# Patient Record
Sex: FEMALE | Race: WHITE | NOT HISPANIC OR LATINO | Employment: OTHER | URBAN - METROPOLITAN AREA
[De-identification: names, ages, dates, MRNs, and addresses within clinical notes are randomized per-mention and may not be internally consistent; named-entity substitution may affect disease eponyms.]

---

## 2017-03-16 ENCOUNTER — ALLSCRIPTS OFFICE VISIT (OUTPATIENT)
Dept: OTHER | Facility: OTHER | Age: 62
End: 2017-03-16

## 2017-03-16 DIAGNOSIS — Z12.31 ENCOUNTER FOR SCREENING MAMMOGRAM FOR MALIGNANT NEOPLASM OF BREAST: ICD-10-CM

## 2017-08-28 ENCOUNTER — HOSPITAL ENCOUNTER (EMERGENCY)
Facility: HOSPITAL | Age: 62
Discharge: HOME | End: 2017-08-28
Payer: MEDICARE

## 2017-08-28 ENCOUNTER — OFFICE VISIT (OUTPATIENT)
Dept: URGENT CARE | Facility: CLINIC | Age: 62
End: 2017-08-28
Payer: MEDICARE

## 2017-08-28 ENCOUNTER — APPOINTMENT (EMERGENCY)
Dept: CT IMAGING | Facility: HOSPITAL | Age: 62
End: 2017-08-28
Payer: MEDICARE

## 2017-08-28 VITALS
TEMPERATURE: 98.2 F | SYSTOLIC BLOOD PRESSURE: 132 MMHG | HEART RATE: 80 BPM | RESPIRATION RATE: 18 BRPM | WEIGHT: 201.06 LBS | HEIGHT: 64 IN | BODY MASS INDEX: 34.33 KG/M2 | DIASTOLIC BLOOD PRESSURE: 70 MMHG | OXYGEN SATURATION: 100 %

## 2017-08-28 DIAGNOSIS — R42 EPISODIC LIGHTHEADEDNESS: ICD-10-CM

## 2017-08-28 DIAGNOSIS — R19.7 ACUTE DIARRHEA: Primary | ICD-10-CM

## 2017-08-28 LAB
ALBUMIN SERPL BCP-MCNC: 3.6 G/DL (ref 3.5–5)
ALP SERPL-CCNC: 97 U/L (ref 46–116)
ALT SERPL W P-5'-P-CCNC: 29 U/L (ref 12–78)
ANION GAP SERPL CALCULATED.3IONS-SCNC: 10 MMOL/L (ref 4–13)
AST SERPL W P-5'-P-CCNC: 17 U/L (ref 5–45)
BASOPHILS # BLD AUTO: 0.03 THOUSANDS/ΜL (ref 0–0.1)
BASOPHILS NFR BLD AUTO: 0 % (ref 0–1)
BILIRUB SERPL-MCNC: 0.4 MG/DL (ref 0.2–1)
BILIRUB UR QL STRIP: ABNORMAL
BUN SERPL-MCNC: 10 MG/DL (ref 5–25)
CALCIUM SERPL-MCNC: 9.3 MG/DL (ref 8.3–10.1)
CHLORIDE SERPL-SCNC: 104 MMOL/L (ref 100–108)
CLARITY UR: CLEAR
CO2 SERPL-SCNC: 26 MMOL/L (ref 21–32)
COLOR UR: YELLOW
CREAT SERPL-MCNC: 0.82 MG/DL (ref 0.6–1.3)
EOSINOPHIL # BLD AUTO: 0.1 THOUSAND/ΜL (ref 0–0.61)
EOSINOPHIL NFR BLD AUTO: 1 % (ref 0–6)
ERYTHROCYTE [DISTWIDTH] IN BLOOD BY AUTOMATED COUNT: 12.5 % (ref 11.6–15.1)
GFR SERPL CREATININE-BSD FRML MDRD: 77 ML/MIN/1.73SQ M
GLUCOSE SERPL-MCNC: 91 MG/DL (ref 65–140)
GLUCOSE UR STRIP-MCNC: NEGATIVE MG/DL
HCT VFR BLD AUTO: 43.3 % (ref 34.8–46.1)
HGB BLD-MCNC: 14.9 G/DL (ref 11.5–15.4)
HGB UR QL STRIP.AUTO: NEGATIVE
KETONES UR STRIP-MCNC: NEGATIVE MG/DL
LEUKOCYTE ESTERASE UR QL STRIP: NEGATIVE
LIPASE SERPL-CCNC: 84 U/L (ref 73–393)
LYMPHOCYTES # BLD AUTO: 2.24 THOUSANDS/ΜL (ref 0.6–4.47)
LYMPHOCYTES NFR BLD AUTO: 23 % (ref 14–44)
MCH RBC QN AUTO: 32.6 PG (ref 26.8–34.3)
MCHC RBC AUTO-ENTMCNC: 34.4 G/DL (ref 31.4–37.4)
MCV RBC AUTO: 95 FL (ref 82–98)
MONOCYTES # BLD AUTO: 0.8 THOUSAND/ΜL (ref 0.17–1.22)
MONOCYTES NFR BLD AUTO: 8 % (ref 4–12)
NEUTROPHILS # BLD AUTO: 6.7 THOUSANDS/ΜL (ref 1.85–7.62)
NEUTS SEG NFR BLD AUTO: 68 % (ref 43–75)
NITRITE UR QL STRIP: NEGATIVE
PH UR STRIP.AUTO: 5.5 [PH] (ref 4.5–8)
PLATELET # BLD AUTO: 212 THOUSANDS/UL (ref 149–390)
PMV BLD AUTO: 11.1 FL (ref 8.9–12.7)
POTASSIUM SERPL-SCNC: 3.8 MMOL/L (ref 3.5–5.3)
PROT SERPL-MCNC: 7.2 G/DL (ref 6.4–8.2)
PROT UR STRIP-MCNC: NEGATIVE MG/DL
RBC # BLD AUTO: 4.57 MILLION/UL (ref 3.81–5.12)
SODIUM SERPL-SCNC: 140 MMOL/L (ref 136–145)
SP GR UR STRIP.AUTO: 1.02 (ref 1–1.03)
TROPONIN I SERPL-MCNC: <0.02 NG/ML
UROBILINOGEN UR QL STRIP.AUTO: 0.2 E.U./DL
WBC # BLD AUTO: 9.87 THOUSAND/UL (ref 4.31–10.16)

## 2017-08-28 PROCEDURE — 85025 COMPLETE CBC W/AUTO DIFF WBC: CPT | Performed by: PHYSICIAN ASSISTANT

## 2017-08-28 PROCEDURE — 80053 COMPREHEN METABOLIC PANEL: CPT | Performed by: PHYSICIAN ASSISTANT

## 2017-08-28 PROCEDURE — 83690 ASSAY OF LIPASE: CPT | Performed by: PHYSICIAN ASSISTANT

## 2017-08-28 PROCEDURE — 74177 CT ABD & PELVIS W/CONTRAST: CPT

## 2017-08-28 PROCEDURE — 96375 TX/PRO/DX INJ NEW DRUG ADDON: CPT

## 2017-08-28 PROCEDURE — 81003 URINALYSIS AUTO W/O SCOPE: CPT | Performed by: PHYSICIAN ASSISTANT

## 2017-08-28 PROCEDURE — 93005 ELECTROCARDIOGRAM TRACING: CPT | Performed by: PHYSICIAN ASSISTANT

## 2017-08-28 PROCEDURE — 99284 EMERGENCY DEPT VISIT MOD MDM: CPT

## 2017-08-28 PROCEDURE — G0463 HOSPITAL OUTPT CLINIC VISIT: HCPCS

## 2017-08-28 PROCEDURE — 96374 THER/PROPH/DIAG INJ IV PUSH: CPT

## 2017-08-28 PROCEDURE — 84484 ASSAY OF TROPONIN QUANT: CPT | Performed by: PHYSICIAN ASSISTANT

## 2017-08-28 PROCEDURE — 36415 COLL VENOUS BLD VENIPUNCTURE: CPT | Performed by: PHYSICIAN ASSISTANT

## 2017-08-28 PROCEDURE — 99213 OFFICE O/P EST LOW 20 MIN: CPT

## 2017-08-28 RX ORDER — ONDANSETRON 2 MG/ML
4 INJECTION INTRAMUSCULAR; INTRAVENOUS ONCE
Status: COMPLETED | OUTPATIENT
Start: 2017-08-28 | End: 2017-08-28

## 2017-08-28 RX ORDER — KETOROLAC TROMETHAMINE 30 MG/ML
15 INJECTION, SOLUTION INTRAMUSCULAR; INTRAVENOUS ONCE
Status: COMPLETED | OUTPATIENT
Start: 2017-08-28 | End: 2017-08-28

## 2017-08-28 RX ORDER — DICYCLOMINE HCL 20 MG
20 TABLET ORAL ONCE
Status: COMPLETED | OUTPATIENT
Start: 2017-08-28 | End: 2017-08-28

## 2017-08-28 RX ORDER — ERGOCALCIFEROL 1.25 MG/1
50000 CAPSULE ORAL WEEKLY
COMMUNITY
End: 2018-10-03 | Stop reason: SDUPTHER

## 2017-08-28 RX ORDER — DICYCLOMINE HYDROCHLORIDE 10 MG/1
10 CAPSULE ORAL
Qty: 20 CAPSULE | Refills: 0 | Status: SHIPPED | OUTPATIENT
Start: 2017-08-28 | End: 2018-10-03 | Stop reason: ALTCHOICE

## 2017-08-28 RX ORDER — ONDANSETRON 4 MG/1
4 TABLET, ORALLY DISINTEGRATING ORAL EVERY 4 HOURS PRN
Qty: 10 TABLET | Refills: 0 | Status: SHIPPED | OUTPATIENT
Start: 2017-08-28 | End: 2018-10-03 | Stop reason: ALTCHOICE

## 2017-08-28 RX ADMIN — KETOROLAC TROMETHAMINE 15 MG: 30 INJECTION, SOLUTION INTRAMUSCULAR; INTRAVENOUS at 17:59

## 2017-08-28 RX ADMIN — ONDANSETRON 4 MG: 2 INJECTION INTRAMUSCULAR; INTRAVENOUS at 17:59

## 2017-08-28 RX ADMIN — IOHEXOL 100 ML: 350 INJECTION, SOLUTION INTRAVENOUS at 18:26

## 2017-08-28 RX ADMIN — DICYCLOMINE HYDROCHLORIDE 20 MG: 20 TABLET ORAL at 19:02

## 2017-08-30 LAB
ATRIAL RATE: 69 BPM
P AXIS: 18 DEGREES
PR INTERVAL: 156 MS
QRS AXIS: -10 DEGREES
QRSD INTERVAL: 80 MS
QT INTERVAL: 398 MS
QTC INTERVAL: 426 MS
T WAVE AXIS: 32 DEGREES
VENTRICULAR RATE: 69 BPM

## 2017-09-06 ENCOUNTER — TRANSCRIBE ORDERS (OUTPATIENT)
Dept: ADMINISTRATIVE | Facility: HOSPITAL | Age: 62
End: 2017-09-06

## 2017-09-06 ENCOUNTER — APPOINTMENT (OUTPATIENT)
Dept: LAB | Facility: HOSPITAL | Age: 62
End: 2017-09-06
Attending: EMERGENCY MEDICINE
Payer: MEDICARE

## 2017-09-06 DIAGNOSIS — R19.7 ACUTE DIARRHEA: ICD-10-CM

## 2017-09-06 LAB — C DIFF TOX GENS STL QL NAA+PROBE: NORMAL

## 2017-09-06 PROCEDURE — 87045 FECES CULTURE AEROBIC BACT: CPT

## 2017-09-06 PROCEDURE — 87046 STOOL CULTR AEROBIC BACT EA: CPT

## 2017-09-06 PROCEDURE — 87899 AGENT NOS ASSAY W/OPTIC: CPT

## 2017-09-06 PROCEDURE — 87493 C DIFF AMPLIFIED PROBE: CPT

## 2017-09-06 PROCEDURE — 87209 SMEAR COMPLEX STAIN: CPT

## 2017-09-06 PROCEDURE — 87015 SPECIMEN INFECT AGNT CONCNTJ: CPT

## 2017-09-06 PROCEDURE — 87177 OVA AND PARASITES SMEARS: CPT

## 2017-09-08 LAB
BACTERIA STL CULT: NORMAL
BACTERIA STL CULT: NORMAL
O+P STL CONC: NORMAL

## 2017-09-12 ENCOUNTER — ALLSCRIPTS OFFICE VISIT (OUTPATIENT)
Dept: OTHER | Facility: OTHER | Age: 62
End: 2017-09-12

## 2017-09-12 DIAGNOSIS — K21.9 GASTRO-ESOPHAGEAL REFLUX DISEASE WITHOUT ESOPHAGITIS: ICD-10-CM

## 2017-09-12 DIAGNOSIS — K58.9 IRRITABLE BOWEL SYNDROME WITHOUT DIARRHEA: ICD-10-CM

## 2017-09-12 DIAGNOSIS — R74.01 NONSPECIFIC ELEVATION OF LEVELS OF TRANSAMINASE AND LACTIC ACID DEHYDROGENASE (LDH): ICD-10-CM

## 2017-09-12 DIAGNOSIS — F32.9 MAJOR DEPRESSIVE DISORDER, SINGLE EPISODE: ICD-10-CM

## 2017-09-12 DIAGNOSIS — Z13.6 ENCOUNTER FOR SCREENING FOR CARDIOVASCULAR DISORDERS: ICD-10-CM

## 2017-09-12 DIAGNOSIS — R74.02 NONSPECIFIC ELEVATION OF LEVELS OF TRANSAMINASE AND LACTIC ACID DEHYDROGENASE (LDH): ICD-10-CM

## 2017-09-12 DIAGNOSIS — R19.7 DIARRHEA: ICD-10-CM

## 2017-09-13 ENCOUNTER — LAB (OUTPATIENT)
Dept: LAB | Facility: CLINIC | Age: 62
End: 2017-09-13
Payer: MEDICARE

## 2017-09-13 ENCOUNTER — TRANSCRIBE ORDERS (OUTPATIENT)
Dept: LAB | Facility: CLINIC | Age: 62
End: 2017-09-13

## 2017-09-13 DIAGNOSIS — Z98.890 DIARRHEA FOLLOWING GASTROINTESTINAL SURGERY: Primary | ICD-10-CM

## 2017-09-13 DIAGNOSIS — Z13.6 SCREENING FOR ISCHEMIC HEART DISEASE: ICD-10-CM

## 2017-09-13 DIAGNOSIS — R19.7 DIARRHEA FOLLOWING GASTROINTESTINAL SURGERY: Primary | ICD-10-CM

## 2017-09-13 DIAGNOSIS — R74.01 NONSPECIFIC ELEVATION OF LEVELS OF TRANSAMINASE OR LACTIC ACID DEHYDROGENASE (LDH): ICD-10-CM

## 2017-09-13 DIAGNOSIS — Z98.890 DIARRHEA FOLLOWING GASTROINTESTINAL SURGERY: ICD-10-CM

## 2017-09-13 DIAGNOSIS — R74.02 NONSPECIFIC ELEVATION OF LEVELS OF TRANSAMINASE OR LACTIC ACID DEHYDROGENASE (LDH): ICD-10-CM

## 2017-09-13 DIAGNOSIS — R19.7 DIARRHEA FOLLOWING GASTROINTESTINAL SURGERY: ICD-10-CM

## 2017-09-13 LAB
ALBUMIN SERPL BCP-MCNC: 3.2 G/DL (ref 3.5–5)
ALP SERPL-CCNC: 90 U/L (ref 46–116)
ALT SERPL W P-5'-P-CCNC: 50 U/L (ref 12–78)
ANION GAP SERPL CALCULATED.3IONS-SCNC: 7 MMOL/L (ref 4–13)
AST SERPL W P-5'-P-CCNC: 16 U/L (ref 5–45)
BASOPHILS # BLD AUTO: 0.03 THOUSANDS/ΜL (ref 0–0.1)
BASOPHILS NFR BLD AUTO: 1 % (ref 0–1)
BILIRUB SERPL-MCNC: 0.49 MG/DL (ref 0.2–1)
BUN SERPL-MCNC: 12 MG/DL (ref 5–25)
CALCIUM SERPL-MCNC: 9.2 MG/DL (ref 8.3–10.1)
CHLORIDE SERPL-SCNC: 108 MMOL/L (ref 100–108)
CHOLEST SERPL-MCNC: 160 MG/DL (ref 50–200)
CO2 SERPL-SCNC: 27 MMOL/L (ref 21–32)
CREAT SERPL-MCNC: 0.88 MG/DL (ref 0.6–1.3)
EOSINOPHIL # BLD AUTO: 0.11 THOUSAND/ΜL (ref 0–0.61)
EOSINOPHIL NFR BLD AUTO: 2 % (ref 0–6)
ERYTHROCYTE [DISTWIDTH] IN BLOOD BY AUTOMATED COUNT: 12.9 % (ref 11.6–15.1)
GFR SERPL CREATININE-BSD FRML MDRD: 71 ML/MIN/1.73SQ M
GLUCOSE P FAST SERPL-MCNC: 87 MG/DL (ref 65–99)
HCT VFR BLD AUTO: 43.8 % (ref 34.8–46.1)
HDLC SERPL-MCNC: 72 MG/DL (ref 40–60)
HGB BLD-MCNC: 14.6 G/DL (ref 11.5–15.4)
LDLC SERPL CALC-MCNC: 72 MG/DL (ref 0–100)
LYMPHOCYTES # BLD AUTO: 2.23 THOUSANDS/ΜL (ref 0.6–4.47)
LYMPHOCYTES NFR BLD AUTO: 36 % (ref 14–44)
MCH RBC QN AUTO: 32.2 PG (ref 26.8–34.3)
MCHC RBC AUTO-ENTMCNC: 33.3 G/DL (ref 31.4–37.4)
MCV RBC AUTO: 97 FL (ref 82–98)
MONOCYTES # BLD AUTO: 0.5 THOUSAND/ΜL (ref 0.17–1.22)
MONOCYTES NFR BLD AUTO: 8 % (ref 4–12)
NEUTROPHILS # BLD AUTO: 3.28 THOUSANDS/ΜL (ref 1.85–7.62)
NEUTS SEG NFR BLD AUTO: 53 % (ref 43–75)
NRBC BLD AUTO-RTO: 0 /100 WBCS
PLATELET # BLD AUTO: 234 THOUSANDS/UL (ref 149–390)
PMV BLD AUTO: 11.2 FL (ref 8.9–12.7)
POTASSIUM SERPL-SCNC: 3.8 MMOL/L (ref 3.5–5.3)
PROT SERPL-MCNC: 7.1 G/DL (ref 6.4–8.2)
RBC # BLD AUTO: 4.53 MILLION/UL (ref 3.81–5.12)
SODIUM SERPL-SCNC: 142 MMOL/L (ref 136–145)
T4 FREE SERPL-MCNC: 1.3 NG/DL (ref 0.76–1.46)
TRIGL SERPL-MCNC: 82 MG/DL
TSH SERPL DL<=0.05 MIU/L-ACNC: 0.77 UIU/ML (ref 0.36–3.74)
WBC # BLD AUTO: 6.16 THOUSAND/UL (ref 4.31–10.16)

## 2017-09-13 PROCEDURE — 80061 LIPID PANEL: CPT

## 2017-09-13 PROCEDURE — 36415 COLL VENOUS BLD VENIPUNCTURE: CPT

## 2017-09-13 PROCEDURE — 85025 COMPLETE CBC W/AUTO DIFF WBC: CPT

## 2017-09-13 PROCEDURE — 84439 ASSAY OF FREE THYROXINE: CPT

## 2017-09-13 PROCEDURE — 80053 COMPREHEN METABOLIC PANEL: CPT

## 2017-09-13 PROCEDURE — 84443 ASSAY THYROID STIM HORMONE: CPT

## 2018-01-13 VITALS
SYSTOLIC BLOOD PRESSURE: 120 MMHG | BODY MASS INDEX: 34.06 KG/M2 | WEIGHT: 199.5 LBS | DIASTOLIC BLOOD PRESSURE: 78 MMHG | HEIGHT: 64 IN

## 2018-01-14 VITALS
HEIGHT: 64 IN | WEIGHT: 199 LBS | SYSTOLIC BLOOD PRESSURE: 126 MMHG | BODY MASS INDEX: 33.97 KG/M2 | DIASTOLIC BLOOD PRESSURE: 80 MMHG

## 2018-06-07 ENCOUNTER — EVALUATION (OUTPATIENT)
Dept: PHYSICAL THERAPY | Facility: CLINIC | Age: 63
End: 2018-06-07
Payer: MEDICARE

## 2018-06-07 DIAGNOSIS — M79.7 FIBROMYALGIA: ICD-10-CM

## 2018-06-07 DIAGNOSIS — M17.0 PRIMARY OSTEOARTHRITIS OF BOTH KNEES: Primary | ICD-10-CM

## 2018-06-07 PROCEDURE — 97163 PT EVAL HIGH COMPLEX 45 MIN: CPT | Performed by: PHYSICAL THERAPIST

## 2018-06-07 PROCEDURE — G8979 MOBILITY GOAL STATUS: HCPCS | Performed by: PHYSICAL THERAPIST

## 2018-06-07 PROCEDURE — G8978 MOBILITY CURRENT STATUS: HCPCS | Performed by: PHYSICAL THERAPIST

## 2018-06-07 PROCEDURE — 97535 SELF CARE MNGMENT TRAINING: CPT | Performed by: PHYSICAL THERAPIST

## 2018-06-07 NOTE — PROGRESS NOTES
PT Evaluation     Today's date: 2018  Patient name: Cj Mckeon  : 1955  MRN: [id number]  Referring provider: Roselia Warren MD  Dx:   Encounter Diagnosis     ICD-10-CM    1  Primary osteoarthritis of both knees M17 0    2  Fibromyalgia M79 7        Start Time: 1045  Stop Time: 1145  Total time in clinic (min): 60 minutes    Assessment  Impairments: abnormal gait, activity intolerance, impaired balance, impaired physical strength, lacks appropriate home exercise program, pain with function and poor posture     Assessment details: Pt presents with chief complaint of B/L knee OA and chronic history of fibromyalgia  PT notes the pt with WNL ROM of the L/S and BLE, decreased fore and BLE mm strength, impaired postural awarness, impaired gait mechanics, decreased balance, pain with function, and decreased functional mobility  Pt would benefit from skilled PT to improve strength, balance, gait, and functional mobility, as well as to decrease pain and AD use with ambulation  Understanding of Dx/Px/POC: good   Prognosis: good    Goals  STG  1  Pt will decrease pain by 25-50%   2  Pt will demonstrate improved gait mechanics   3  Initiate HEP    LTG  1  Pt will improve BLE mm strength by 1-2 mm grades  2  Pt will demonstrate improved standing balance  3   Pt will ambulate safely without AD use for community distances       Plan  Patient would benefit from: PT eval and skilled physical therapy  Planned modality interventions: unattended electrical stimulation and thermotherapy: hydrocollator packs  Planned therapy interventions: abdominal trunk stabilization, balance, flexibility, gait training, graded exercise, home exercise program, manual therapy, massage, neuromuscular re-education, patient education, postural training, strengthening, stretching and therapeutic exercise  Frequency: 3x week  Duration in visits: 12  Duration in weeks: 4  Treatment plan discussed with: patient  Plan details: Discussed findings of POC with pt, pt agreeable to skilled PT 2-3x/wk for 4 wks        Subjective Evaluation    History of Present Illness  Mechanism of injury: Pt presents with chief complaint of B/L knee OA and chronic hx of fibromyalgia  Pt was seen by MD on  and was prescribed a course of skilled PT to treat current symptoms  Pt reports she is receivng Euflexxa injections but feels that they are no longer being effective  Pt is scheduled to receive last Euflexxa injection tomorrow  Pt has been using SPC for approximately 6 months, starting with occasional use for long distances and now is typically all the time, primarily for balance and assistance with gait  Pt notes majority of symptoms in R knee, with tightness in BLE mm, especially in the posterior chain  Pt also notes TrPs along medial aspect of B/L knee and a feeling of fluid accumulation in B/L knees  Recurrent probem    Quality of life: good    Pain  Current pain ratin  At best pain ratin  At worst pain rating: 10  Location: Bilateral lower extremity  Quality: tight, dull ache and sharp  Relieving factors: rest, heat and medications  Aggravating factors: walking and stair climbing (overexertion, weather)  Progression: worsening    Social Support    Employment status: not working    Diagnostic Tests  X-ray: abnormal  Treatments  Previous treatment: injection treatment, physical therapy and massage (massage has helped, chiropractor has not)  Patient Goals  Patient goals for therapy: decreased pain, improved balance, increased motion and increased strength  Patient goal: to walk without cane with minimal pain        Objective     Postural Observations  Seated posture: fair  Standing posture: fair        Palpation   Left   Tenderness of the erector spinae, distal biceps femoris, distal semimembranosus, distal semitendinosus, gluteus christiano, gluteus medius and lumbar paraspinals       Right Tenderness of the erector spinae, distal biceps femoris, distal semimembranosus, distal semitendinosus, gluteus christiano, gluteus medius, lateral gastrocnemius, lumbar paraspinals, proximal biceps femoris, proximal semimembranosus and proximal semitendinosus  Tenderness     Left Hip   Tenderness in the PSIS and greater trochanter  Right Hip   Tenderness in the PSIS, greater trochanter and ischial tuberosity  Right Knee   Tenderness in the lateral joint line  Neurological Testing     Reflexes   Left   Patellar (L4): normal (2+)  Achilles (S1): normal (2+)    Right   Patellar (L4): normal (2+)  Achilles (S1): normal (2+)    Active Range of Motion     Additional Active Range of Motion Details  PT notes the pt with WNL AROM of the L/S and BLE    Strength/Myotome Testing     Left Hip   Planes of Motion   Flexion: 3+  Extension: 4  Abduction: 4  Adduction: 4    Right Hip   Planes of Motion   Flexion: 3+  Extension: 4  Abduction: 4  Adduction: 4    Left Knee   Flexion: 4+  Extension: 3+    Right Knee   Flexion: 4+  Extension: 3+    Left Ankle/Foot   Dorsiflexion: 4+  Plantar flexion: 4+    Right Ankle/Foot   Dorsiflexion: 4+  Plantar flexion: 4+    Additional Strength Details  Pain with b/l resisted knee ext and R hip flexion (mm tightness)    Tests     Additional Tests Details  PT notes the pt with WNL ligament stability B/L, (-) meniscal testing, and equal mm activation    Ambulation   Weight-Bearing Status   Assistive device used: single point cane    Ambulation: Level Surfaces   Ambulation with assistive device: independent    Ambulation: Stairs   Pattern: non-reciprocal  Railings: one rail  Pattern: non-reciprocal  Railings: one rail    Observational Gait   Gait: antalgic   Decreased right stance time and right step length  Quality of Movement During Gait   Trunk  Forward lean  Pelvis    Pelvis (Left): Positive Trendelenburg  Pelvis (Right): Positive Trendelenburg         Flowsheet Rows      Most Recent Value   PT/OT G-Codes   Current Score  35   Projected Score  50   FOTO information reviewed  Yes   Assessment Type  Evaluation   G code set  Mobility: Walking & Moving Around   Mobility: Walking and Moving Around Current Status ()  CL   Mobility: Walking and Moving Around Goal Status ()  CK          Precautions B/L Knee OA; Fibromyalgia    Specialty Daily Treatment Diary     Manual  6/7/18       STM and TPR prn                                            Exercise Diary  6/7/18       NuStep        Standing SLR 3-way        Monster Walks        Side Step and Squat        Fwd/Lat Step Ups        Step Up and Over        Tandem on Foam        Single Leg Stance        Tandem Walk on Foam        Biodex LOS        Biodex LOS        Biodex Random        Biodex Random        Biodex Maze        Biodex Maze        Gait Training                                            Modalities 6/7/18       MHP to Low Back and B/L Knees in seated 15 min

## 2018-06-07 NOTE — LETTER
2018    Steven Gupta MD  [address] [place] [address]    Patient: Claudine Bain   YOB: 1955   Date of Visit: 2018     Encounter Diagnosis     ICD-10-CM    1  Primary osteoarthritis of both knees M17 0    2  Fibromyalgia M79 7        Dear Dr Chi Lowery:    Please review the attached Plan of Care from [facility] recent visit  Please verify that you agree therapy should continue by signing the attached document and sending it back to our office  If you have any questions or concerns, please don't hesitate to call  Sincerely,    Fred Dodd, PT      Referring Provider:      I certify that I have read the below Plan of Care and certify the need for these services furnished under this plan of treatment while under my care  Steven Gupta MD  [facility] 31: [phone]          PT Evaluation     Today's date: 2018  Patient name: Claudine Bain  : 1955  MRN: [id number]  Referring provider: Kishan Day MD  Dx:   Encounter Diagnosis     ICD-10-CM    1  Primary osteoarthritis of both knees M17 0    2  Fibromyalgia M79 7        Start Time: 1045  Stop Time: 1145  Total time in clinic (min): 60 minutes    Assessment  Impairments: abnormal gait, activity intolerance, impaired balance, impaired physical strength, lacks appropriate home exercise program, pain with function and poor posture     Assessment details: Pt presents with chief complaint of B/L knee OA and chronic history of fibromyalgia  PT notes the pt with WNL ROM of the L/S and BLE, decreased fore and BLE mm strength, impaired postural awarness, impaired gait mechanics, decreased balance, pain with function, and decreased functional mobility  Pt would benefit from skilled PT to improve strength, balance, gait, and functional mobility, as well as to decrease pain and AD use with ambulation     Understanding of Dx/Px/POC: good Prognosis: good    Goals  STG  1  Pt will decrease pain by 25-50%   2  Pt will demonstrate improved gait mechanics   3  Initiate HEP    LTG  1  Pt will improve BLE mm strength by 1-2 mm grades  2  Pt will demonstrate improved standing balance  3  Pt will ambulate safely without AD use for community distances       Plan  Patient would benefit from: PT eval and skilled physical therapy  Planned modality interventions: unattended electrical stimulation and thermotherapy: hydrocollator packs  Planned therapy interventions: abdominal trunk stabilization, balance, flexibility, gait training, graded exercise, home exercise program, manual therapy, massage, neuromuscular re-education, patient education, postural training, strengthening, stretching and therapeutic exercise  Frequency: 3x week  Duration in visits: 12  Duration in weeks: 4  Treatment plan discussed with: patient  Plan details: Discussed findings of POC with pt, pt agreeable to skilled PT 2-3x/wk for 4 wks        Subjective Evaluation    History of Present Illness  Mechanism of injury: Pt presents with chief complaint of B/L knee OA and chronic hx of fibromyalgia  Pt was seen by MD on  and was prescribed a course of skilled PT to treat current symptoms  Pt reports she is receivng Euflexxa injections but feels that they are no longer being effective  Pt is scheduled to receive last Euflexxa injection tomorrow  Pt has been using SPC for approximately 6 months, starting with occasional use for long distances and now is typically all the time, primarily for balance and assistance with gait  Pt notes majority of symptoms in R knee, with tightness in BLE mm, especially in the posterior chain  Pt also notes TrPs along medial aspect of B/L knee and a feeling of fluid accumulation in B/L knees     Recurrent probem    Quality of life: good    Pain  Current pain ratin  At best pain ratin  At worst pain rating: 10  Location: Bilateral lower extremity  Quality: tight, dull ache and sharp  Relieving factors: rest, heat and medications  Aggravating factors: walking and stair climbing (overexertion, weather)  Progression: worsening    Social Support    Employment status: not working    Diagnostic Tests  X-ray: abnormal  Treatments  Previous treatment: injection treatment, physical therapy and massage (massage has helped, chiropractor has not)  Patient Goals  Patient goals for therapy: decreased pain, improved balance, increased motion and increased strength  Patient goal: to walk without cane with minimal pain        Objective     Postural Observations  Seated posture: fair  Standing posture: fair        Palpation   Left   Tenderness of the erector spinae, distal biceps femoris, distal semimembranosus, distal semitendinosus, gluteus christiano, gluteus medius and lumbar paraspinals  Right Tenderness of the erector spinae, distal biceps femoris, distal semimembranosus, distal semitendinosus, gluteus christiano, gluteus medius, lateral gastrocnemius, lumbar paraspinals, proximal biceps femoris, proximal semimembranosus and proximal semitendinosus  Tenderness     Left Hip   Tenderness in the PSIS and greater trochanter  Right Hip   Tenderness in the PSIS, greater trochanter and ischial tuberosity  Right Knee   Tenderness in the lateral joint line       Neurological Testing     Reflexes   Left   Patellar (L4): normal (2+)  Achilles (S1): normal (2+)    Right   Patellar (L4): normal (2+)  Achilles (S1): normal (2+)    Active Range of Motion     Additional Active Range of Motion Details  PT notes the pt with WNL AROM of the L/S and BLE    Strength/Myotome Testing     Left Hip   Planes of Motion   Flexion: 3+  Extension: 4  Abduction: 4  Adduction: 4    Right Hip   Planes of Motion   Flexion: 3+  Extension: 4  Abduction: 4  Adduction: 4    Left Knee   Flexion: 4+  Extension: 3+    Right Knee   Flexion: 4+  Extension: 3+    Left Ankle/Foot   Dorsiflexion: 4+  Plantar flexion: 4+    Right Ankle/Foot   Dorsiflexion: 4+  Plantar flexion: 4+    Additional Strength Details  Pain with b/l resisted knee ext and R hip flexion (mm tightness)    Tests     Additional Tests Details  PT notes the pt with WNL ligament stability B/L, (-) meniscal testing, and equal mm activation    Ambulation   Weight-Bearing Status   Assistive device used: single point cane    Ambulation: Level Surfaces   Ambulation with assistive device: independent    Ambulation: Stairs   Pattern: non-reciprocal  Railings: one rail  Pattern: non-reciprocal  Railings: one rail    Observational Gait   Gait: antalgic   Decreased right stance time and right step length  Quality of Movement During Gait   Trunk  Forward lean  Pelvis    Pelvis (Left): Positive Trendelenburg  Pelvis (Right): Positive Trendelenburg         Flowsheet Rows      Most Recent Value   PT/OT G-Codes   Current Score  35   Projected Score  50   FOTO information reviewed  Yes   Assessment Type  Evaluation   G code set  Mobility: Walking & Moving Around   Mobility: Walking and Moving Around Current Status ()  CL   Mobility: Walking and Moving Around Goal Status ()  CK          Precautions B/L Knee OA; Fibromyalgia    Specialty Daily Treatment Diary     Manual  6/7/18       STM and TPR prn                                            Exercise Diary  6/7/18       NuStep        Standing SLR 3-way        Monster Walks        Side Step and Squat        Fwd/Lat Step Ups        Step Up and Over        Tandem on Foam        Single Leg Stance        Tandem Walk on Foam        Biodex LOS        Biodex LOS        Biodex Random        Biodex Random        Biodex Maze        Biodex Maze        Gait Training                                            Modalities 6/7/18       MHP to Low Back and B/L Knees in seated 15 min

## 2018-06-11 ENCOUNTER — TRANSCRIBE ORDERS (OUTPATIENT)
Dept: PHYSICAL THERAPY | Facility: CLINIC | Age: 63
End: 2018-06-11

## 2018-06-11 DIAGNOSIS — M17.0 PRIMARY OSTEOARTHRITIS OF BOTH KNEES: Primary | ICD-10-CM

## 2018-06-11 DIAGNOSIS — M79.7 FIBROMYALGIA: ICD-10-CM

## 2018-06-12 ENCOUNTER — OFFICE VISIT (OUTPATIENT)
Dept: PHYSICAL THERAPY | Facility: CLINIC | Age: 63
End: 2018-06-12
Payer: MEDICARE

## 2018-06-12 DIAGNOSIS — M17.0 PRIMARY OSTEOARTHRITIS OF BOTH KNEES: Primary | ICD-10-CM

## 2018-06-12 DIAGNOSIS — M79.7 FIBROMYALGIA: ICD-10-CM

## 2018-06-12 PROCEDURE — 97110 THERAPEUTIC EXERCISES: CPT

## 2018-06-12 NOTE — PROGRESS NOTES
Daily Note     Today's date: 2018  Patient name: Larry Daniels  : 1955  MRN: [id number]  Referring provider: Natty Mackey MD  Dx: No diagnosis found  Subjective: Pt  Reports she had a terrible weekend  Her pain was 9-10/10  Pt  Reports she is a little better today 8/10 with c/o pain in BLB/LE  Objective: See treatment diary below    Manual  18      STM and TPR prn  NP                                          Exercise Diary  18      NuStep  L1 6'      Standing SLR 3-way  2x5 reps ea      Monster Walks        Side Step and Squat  2x10'      Fwd/Lat Step Ups  X 5 Bi FWD      Step Up and Over        Tandem on Foam        Single Leg Stance        Tandem Walk on Foam        Biodex LOS  Easy x 2 74%/  68%      Biodex LOS        Biodex Random        Biodex Random        Biodex Maze        Biodex Maze        Gait Training                                            Modalities 18      MHP to Low Back and B/L Knees in seated 15 min 15 min                                  Assessment: Tolerated treatment fair  Patient would benefit from continued PT  Pt  Limited today with POC secondary to c/o pain  Pt  Reports her pain has decreased to 6/10 following RX  Plan: Progress treatment as tolerated

## 2018-06-14 ENCOUNTER — OFFICE VISIT (OUTPATIENT)
Dept: PHYSICAL THERAPY | Facility: CLINIC | Age: 63
End: 2018-06-14
Payer: MEDICARE

## 2018-06-14 DIAGNOSIS — M17.0 PRIMARY OSTEOARTHRITIS OF BOTH KNEES: Primary | ICD-10-CM

## 2018-06-14 DIAGNOSIS — M79.7 FIBROMYALGIA: ICD-10-CM

## 2018-06-14 PROCEDURE — 97150 GROUP THERAPEUTIC PROCEDURES: CPT | Performed by: PHYSICAL THERAPIST

## 2018-06-14 PROCEDURE — 97110 THERAPEUTIC EXERCISES: CPT | Performed by: PHYSICAL THERAPIST

## 2018-06-14 PROCEDURE — 97140 MANUAL THERAPY 1/> REGIONS: CPT | Performed by: PHYSICAL THERAPIST

## 2018-06-14 PROCEDURE — 97535 SELF CARE MNGMENT TRAINING: CPT | Performed by: PHYSICAL THERAPIST

## 2018-06-14 NOTE — PROGRESS NOTES
Daily Note     Today's date: 2018  Patient name: Jennifer Varma  : 1955  MRN: [id number]  Referring provider: Delbert Brooks MD  Dx:   Encounter Diagnosis     ICD-10-CM    1  Primary osteoarthritis of both knees M17 0    2  Fibromyalgia M79 7                   Subjective:  Patient reports continuation of bilateral knee pain with difficulty with walking and standing activities  Patient reports the stairs continue to be difficult as well with bilateral knee pain  Patient reports 8/10 pain levels in the bilateral knees at the start of the session  Objective: See treatment diary below    Manual  18     STM and TPR prn  NP Bilat knee and LE stretching with manual LE traction  15 min                                          Exercise Diary  18     NuStep  L1 6' L3 10 Min      Standing SLR 3-way  2x5 reps ea      Monster Walks   4x10 Feet  Light Blue      Side Step and Squat  2x10' 4x10 Feet Light Blue      Fwd/Lat Step Ups  X 5 Bi FWD      Step Up and Over        Tandem on Foam   3x10" Bilat      Single Leg Stance        Tandem Walk on Foam        Biodex LOS  Easy x 2 74%/  68%      Biodex LOS        Biodex Random        Biodex Random        Biodex Maze        Biodex Maze        Gait Training        Stair HR/TR    2x10 3" hold                                  Modalities 18     MHP to Low Back and B/L Knees in seated 15 min 15 min 15 min                                    Assessment: Tolerated treatment fair  PT notes modified treatment secondary to increase pain levels in the bilateral knees  PT notes review of current research results with patient with focus on light to moderate cardiac and strengthening activities leads to best results for fibromyalgic symptoms so patient is aware of PT POC to address symptoms and functional limitations  Post session, the patient reported decrease pain levels and increase ease with walking    PT notes addition of monster walk and side step and squat to HEP to address mobility limitations  Plan: Continue per plan of care

## 2018-06-18 ENCOUNTER — OFFICE VISIT (OUTPATIENT)
Dept: PHYSICAL THERAPY | Facility: CLINIC | Age: 63
End: 2018-06-18
Payer: MEDICARE

## 2018-06-18 DIAGNOSIS — M79.7 FIBROMYALGIA: ICD-10-CM

## 2018-06-18 DIAGNOSIS — M17.0 PRIMARY OSTEOARTHRITIS OF BOTH KNEES: Primary | ICD-10-CM

## 2018-06-18 PROCEDURE — 97110 THERAPEUTIC EXERCISES: CPT | Performed by: PHYSICAL THERAPIST

## 2018-06-18 PROCEDURE — 97140 MANUAL THERAPY 1/> REGIONS: CPT | Performed by: PHYSICAL THERAPIST

## 2018-06-18 PROCEDURE — 97150 GROUP THERAPEUTIC PROCEDURES: CPT | Performed by: PHYSICAL THERAPIST

## 2018-06-18 NOTE — PROGRESS NOTES
Daily Note     Today's date: 2018  Patient name: Glenys Roa  : 1955  MRN: [id number]  Referring provider: Ijeoma Hernandez MD  Dx:   Encounter Diagnosis     ICD-10-CM    1  Primary osteoarthritis of both knees M17 0    2  Fibromyalgia M79 7                   Subjective:  Patient reports having a bad weekend and morning with increase pain levels in the legs and back  Patient reports both calves are sore and tight with difficulty with standing and walking activities  Patient reports 9/10 pain levels at the start of the session  Patient reports 7/10 pain post session  Patient states her fibromyalgia is really acting up leading to the increase pain levels  Objective: See treatment diary below    Manual  18    STM and TPR prn  NP Bilat knee and LE stretching with manual LE traction  15 min  15 min                                        Exercise Diary  18    NuStep  L1 6' L3 10 Min  10 min L3     Standing SLR 3-way  2x5 reps ea      Monster Walks   4x10 Feet  Light Blue      Side Step and Squat  2x10' 4x10 Feet Light Blue      Fwd/Lat Step Ups  X 5 Bi FWD      Step Up and Over        Tandem on Foam   3x10" Bilat  3x10" Bilat     Single Leg Stance        Tandem Walk on Foam        Biodex LOS  Easy x 2 74%/  68%      Biodex LOS        Biodex Random        Biodex Random        Biodex Maze        Biodex Maze        Gait Training        Stair HR/TR    2x10 3" hold      Stair gastroc stretch     10x5" hold Bilat     LTR with Tball     10x5" hold Bilat                 Modalities 18     MHP to Low Back and B/L Knees in seated 15 min 15 min 15 min  15 min                                         Assessment: Tolerated treatment fair  PT notes modified treatment secondary to increase pain levels in the low back and bilateral LE but PT will continue to progress toward therapy goals and full TE session as tolerated by patient          Plan: Continue per plan of care

## 2018-06-20 ENCOUNTER — OFFICE VISIT (OUTPATIENT)
Dept: PHYSICAL THERAPY | Facility: CLINIC | Age: 63
End: 2018-06-20
Payer: MEDICARE

## 2018-06-20 DIAGNOSIS — M17.0 PRIMARY OSTEOARTHRITIS OF BOTH KNEES: Primary | ICD-10-CM

## 2018-06-20 DIAGNOSIS — M79.7 FIBROMYALGIA: ICD-10-CM

## 2018-06-20 PROCEDURE — 97150 GROUP THERAPEUTIC PROCEDURES: CPT | Performed by: PHYSICAL THERAPIST

## 2018-06-20 PROCEDURE — 97110 THERAPEUTIC EXERCISES: CPT | Performed by: PHYSICAL THERAPIST

## 2018-06-20 PROCEDURE — 97140 MANUAL THERAPY 1/> REGIONS: CPT | Performed by: PHYSICAL THERAPIST

## 2018-06-20 NOTE — PROGRESS NOTES
Daily Note     Today's date: 2018  Patient name: Donald Kaur  : 1955  MRN: [id number]  Referring provider: Renetta Jaffe MD  Dx:   Encounter Diagnosis     ICD-10-CM    1  Primary osteoarthritis of both knees M17 0    2  Fibromyalgia M79 7                   Subjective:  Patient reports having a better day today with decrease pain in the back of the legs but continuation of pain in the knees  Patient reports 6/10 pain levels at the start of the session with 5/10 pain levels in the bilateral knees post session  Objective: See treatment diary below    Manual  18   STM and TPR prn  NP Bilat knee and LE stretching with manual LE traction  15 min  15 min 15 min                                        Exercise Diary  18   NuStep  L1 6' L3 10 Min  10 min L3  10 min  L3    Standing SLR 3-way  2x5 reps ea      Monster Walks   4x10 Feet  Light Blue   4x10 Feet Light Blue    Side Step and Squat  2x10' 4x10 Feet Light Blue   4x10 Feet Light Blue    Fwd/Lat Step Ups  X 5 Bi FWD      Step Up and Over        Tandem on Foam   3x10" Bilat  3x10" Bilat  3x10" hold   Bilat    Single Leg Stance        Tandem Walk on Foam        Biodex LOS  Easy x 2 74%/  68%      Biodex LOS        Biodex Random        Biodex Random        Biodex Maze        Biodex Maze        Gait Training        Stair HR/TR    2x10 3" hold      Stair gastroc stretch     10x5" hold Bilat  10x5" Hold   Bilat    LTR with Tball     10x5" hold Bilat     DKTC with Tball      2x10  Orange Tball        Modalities 18   MHP to Low Back and B/L Knees in seated 15 min 15 min 15 min  15 min  15 min                                              Assessment: Tolerated treatment well  PT notes continuation of progression of TE program with focus on gait/balance and manual therapy to decrease pain levels and improve functional limitations to meet therapy goals    PT notes the patient able to tolerate increase standing and walking activities with decrease pain levels in the LE and patient will continue to adjust as tolerated by patient  Plan: Continue per plan of care

## 2018-06-22 ENCOUNTER — OFFICE VISIT (OUTPATIENT)
Dept: PHYSICAL THERAPY | Facility: CLINIC | Age: 63
End: 2018-06-22
Payer: MEDICARE

## 2018-06-22 DIAGNOSIS — M79.7 FIBROMYALGIA: ICD-10-CM

## 2018-06-22 DIAGNOSIS — M17.0 PRIMARY OSTEOARTHRITIS OF BOTH KNEES: Primary | ICD-10-CM

## 2018-06-22 PROCEDURE — 97140 MANUAL THERAPY 1/> REGIONS: CPT | Performed by: PHYSICAL THERAPIST

## 2018-06-22 PROCEDURE — 97110 THERAPEUTIC EXERCISES: CPT | Performed by: PHYSICAL THERAPIST

## 2018-06-22 NOTE — PROGRESS NOTES
Daily Note     Today's date: 2018  Patient name: Arielle Moreno  : 1955  MRN: [id number]  Referring provider: Gracia Arguelles MD  Dx: No diagnosis found  Subjective: Pt  Reports her pain is 8-9/10 today in her whole body  Objective: See treatment diary below    Manual  18   STM and TPR prn Bilat knee and LE stretching  With manual  LE traction  15 min NP Bilat knee and LE stretching with manual LE traction  15 min  15 min 15 min                                        Exercise Diary  18   NuStep L3 10 min L1 6' L3 10 Min  10 min L3  10 min  L3    Standing SLR 3-way  2x5 reps ea      Monster Walks   4x10 Feet  Light Blue   4x10 Feet Light Blue    Side Step and Squat  2x10' 4x10 Feet Light Blue   4x10 Feet Light Blue    Fwd/Lat Step Ups  X 5 Bi FWD      Step Up and Over        Tandem on Foam   3x10" Bilat  3x10" Bilat  3x10" hold   Bilat    Single Leg Stance        Tandem Walk on Foam 4x10'       Biodex LOS  Easy x 2 74%/  68%      Biodex LOS        Biodex Random        Biodex Random        Biodex Maze        Biodex Maze        Gait Training        Stair HR/TR    2x10 3" hold      Stair gastroc stretch  10x10" ea   10x5" hold Bilat  10x5" Hold   Bilat    LTR with Tball  10x5"hold  Bilat   10x5" hold Bilat     DKTC with Tball  10x5"hold    2x10  Orange Tball        Modalities 18   MHP to Low Back and B/L Knees in seated 15 min 15 min 15 min  15 min  15 min                                              Assessment: Tolerated treatment well  Patient demonstrated fatigue post treatment and would benefit from continued PT  Pt  Does report her pain is relieved with therapy, although still persists  Plan: Progress treatment as tolerated

## 2018-06-26 ENCOUNTER — OFFICE VISIT (OUTPATIENT)
Dept: PHYSICAL THERAPY | Facility: CLINIC | Age: 63
End: 2018-06-26
Payer: MEDICARE

## 2018-06-26 DIAGNOSIS — M17.0 PRIMARY OSTEOARTHRITIS OF BOTH KNEES: Primary | ICD-10-CM

## 2018-06-26 DIAGNOSIS — M79.7 FIBROMYALGIA: ICD-10-CM

## 2018-06-26 PROCEDURE — 97112 NEUROMUSCULAR REEDUCATION: CPT | Performed by: PHYSICAL THERAPIST

## 2018-06-26 PROCEDURE — 97110 THERAPEUTIC EXERCISES: CPT | Performed by: PHYSICAL THERAPIST

## 2018-06-26 PROCEDURE — 97150 GROUP THERAPEUTIC PROCEDURES: CPT | Performed by: PHYSICAL THERAPIST

## 2018-06-26 PROCEDURE — 97535 SELF CARE MNGMENT TRAINING: CPT | Performed by: PHYSICAL THERAPIST

## 2018-06-26 NOTE — PROGRESS NOTES
Daily Note     Today's date: 2018  Patient name: Kaye Killian  : 1955  MRN: [id number]  Referring provider: Iveth Ridley MD  Dx:   Encounter Diagnosis     ICD-10-CM    1  Primary osteoarthritis of both knees M17 0    2  Fibromyalgia M79 7        Start Time: 0800  Stop Time: 0930  Total time in clinic (min): 90 minutes    Subjective: Pt reports she had a rough weekend with constant 10/10 pain  Pt began using stabilizing knee brace for R knee as suggested by Dr Anca Wong  Pt began wearing knee brace over weekend but thought it was causing her increased pain        Objective: See treatment diary below       Manual  18   STM and TPR prn Bilat knee and LE stretching  With manual  LE traction  15 min NP 2* pain Bilat knee and LE stretching with manual LE traction  15 min  15 min 15 min                                                                  Exercise Diary  18   NuStep L3 10 min L3 10 min L3 10 Min  10 min L3  10 min  L3    Standing SLR 3-way            Monster Walks   4x10 Feet  Light Blue 4x10 Feet  Light Blue    4x10 Feet Light Blue    Side Step and Squat   4x10 Feet  Light Blue 4x10 Feet Light Blue    4x10 Feet Light Blue    Fwd/Lat Step Ups            Step Up and Over            Tandem on Foam    3x10" Bilat  3x10" Bilat  3x10" hold   Bilat    Single Leg Stance            Tandem Walk on Foam 4x10' 4x10 Feet         Biodex LOS            Biodex LOS            Biodex Random            Biodex Random            Biodex Maze            Biodex Maze            Gait Training            Stair HR/TR     2x10 3" hold        Stair gastroc stretch  10x10" ea 10x10" each   10x5" hold Bilat  10x5" Hold   Bilat    LTR with Tball  10x5"hold  Bilat 2x10 Bilat  5" hold   10x5" hold Bilat      DKTC with Tball  10x5"hold 2x10  5" hold     2x10  Orange Tball          Modalities 18   MHP to Low Back and B/L Knees in seated 15 min 15 min 15 min  15 min  15 min                                      Assessment: Tolerated treatment well  Patient exhibited good technique with therapeutic exercises and would benefit from continued PT  Pt's knee brace adjusted and pt reported relief of symptoms she was feeling over weekend  Pt educated to trial knee brace at new setting to see if benefit can be achieved  Pt tolerated exercises well this session  Noted fatigue/soreness post treatment, however, no increased pain noted t/o treatment  Manual techniques held this visit 2* increased pain levels pre-treatment  Plan: Continue per plan of care  Progress treatment as tolerated

## 2018-06-27 ENCOUNTER — OFFICE VISIT (OUTPATIENT)
Dept: PHYSICAL THERAPY | Facility: CLINIC | Age: 63
End: 2018-06-27
Payer: MEDICARE

## 2018-06-27 DIAGNOSIS — M79.7 FIBROMYALGIA: ICD-10-CM

## 2018-06-27 DIAGNOSIS — M17.0 PRIMARY OSTEOARTHRITIS OF BOTH KNEES: Primary | ICD-10-CM

## 2018-06-27 PROCEDURE — 97140 MANUAL THERAPY 1/> REGIONS: CPT | Performed by: PHYSICAL THERAPIST

## 2018-06-27 PROCEDURE — 97110 THERAPEUTIC EXERCISES: CPT | Performed by: PHYSICAL THERAPIST

## 2018-06-27 NOTE — PROGRESS NOTES
Daily Note     Today's date: 2018  Patient name: Bar Luna  : 1955  MRN: [id number]  Referring provider: Madina Vega MD  Dx:   Encounter Diagnosis     ICD-10-CM    1  Primary osteoarthritis of both knees M17 0    2  Fibromyalgia M79 7                   Subjective:  Patient reports having a really bad past few days with increase pain levels and difficulty with walking and ADL  Patient feels she has to call her doctor to get some pain meds to help with the pain  Patient reports her fibromyalgia is really acting up causing the increase pain levels  Patient reports 10/10 pain levels at the start of the session and 7/10 pain levels post session         Objective: See treatment diary below    Manual  18     STM and TPR prn Bilat knee and LE stretching  With manual  LE traction  15 min NP 2* pain Bilat knee and LE stretching with manual LE traction  15 min                                                                    Exercise Diary  18   NuStep L3 10 min L3 10 min 10 min L2  10 min L3  10 min  L3    Standing SLR 3-way           Monster Walks   4x10 Feet  Light Blue    4x10 Feet Light Blue    Side Step and Squat   4x10 Feet  Light Blue    4x10 Feet Light Blue    Fwd/Lat Step Ups            Step Up and Over            Tandem on Foam     3x10" Bilat  3x10" hold   Bilat    Single Leg Stance            Tandem Walk on Foam 4x10' 4x10 Feet         Biodex LOS            Biodex LOS            Biodex Random            Biodex Random            Biodex Maze            Biodex Maze            Gait Training            Stair HR/TR             Stair gastroc stretch  10x10" ea 10x10" each   10x5" hold Bilat  10x5" Hold   Bilat    LTR with Tball  10x5"hold  Bilat 2x10 Bilat  5" hold   10x5" hold Bilat      DKTC with Tball  10x5"hold 2x10  5" hold     2x10  Orange Tball          Modalities 18   MHP to Low Back and B/L Knees in seated 15 min 15 min 15 min  15 min  15 min                                      Assessment: Tolerated treatment poor  PT notes modified treatment secondary to increase pain levels in the low back and bilateral knees but PT will continue to progress toward therapy goals and full TE session as tolerated by patient  PT notes recommendation to patient to f/u with MD about possible prescription for addressing of the fibromyalgia symptoms and allow PT to progress with POC  Plan: Continue per plan of care

## 2018-06-29 ENCOUNTER — OFFICE VISIT (OUTPATIENT)
Dept: PHYSICAL THERAPY | Facility: CLINIC | Age: 63
End: 2018-06-29
Payer: MEDICARE

## 2018-06-29 DIAGNOSIS — M79.7 FIBROMYALGIA: ICD-10-CM

## 2018-06-29 DIAGNOSIS — M17.0 PRIMARY OSTEOARTHRITIS OF BOTH KNEES: Primary | ICD-10-CM

## 2018-06-29 PROCEDURE — 97110 THERAPEUTIC EXERCISES: CPT | Performed by: PHYSICAL THERAPIST

## 2018-06-29 PROCEDURE — 97140 MANUAL THERAPY 1/> REGIONS: CPT | Performed by: PHYSICAL THERAPIST

## 2018-06-29 PROCEDURE — 97150 GROUP THERAPEUTIC PROCEDURES: CPT | Performed by: PHYSICAL THERAPIST

## 2018-06-29 NOTE — PROGRESS NOTES
Daily Note     Today's date: 2018  Patient name: Oziel Seth  : 1955  MRN: [id number]  Referring provider: Pelno Alvarado MD  Dx:   Encounter Diagnosis     ICD-10-CM    1  Primary osteoarthritis of both knees M17 0    2  Fibromyalgia M79 7                   Subjective:  Patient reports she did contact her PCP and was placed on 6 day medrol pack  Patient reports the pain has drastically decreased with the meds  Patient reports she is happy the staff recommended to the patient to contact PCP about symptoms  Patient reports 4/10 pain levels at the start of the session          Objective: See treatment diary below    Manual  18    STM and TPR prn Bilat knee and LE stretching  With manual  LE traction  15 min NP 2* pain Bilat knee and LE stretching with manual LE traction  15 min  15 min                                                                   Exercise Diary  18   NuStep L3 10 min L3 10 min 10 min L2  10 min L2 10 min  L3    Standing SLR 3-way           Monster Walks   4x10 Feet  Light Blue   4x10 Feet Green  4x10 Feet Light Blue    Side Step and Squat   4x10 Feet  Light Blue   4x10 Feet Green  4x10 Feet Light Blue    Fwd/Lat Step Ups            Step Up and Over            Tandem on Foam      3x10" hold   Bilat    Single Leg Stance       Bridge with Tball 10x   Orange Ball      Tandem Walk on Foam 4x10' 4x10 Feet         Biodex LOS            Biodex LOS            Biodex Random            Biodex Random            Biodex Maze            Biodex Maze            Gait Training            Stair HR/TR             Stair gastroc stretch  10x10" ea 10x10" each   5x10" hold Bilat  10x5" Hold   Bilat    LTR with Tball  10x5"hold  Bilat 2x10 Bilat  5" hold   2x10 Bilat      DKTC with Tball  10x5"hold 2x10  5" hold    2x10  2x10  Orange Tball          Modalities 18   MHP to Low Back and B/L Knees in seated 15 min 15 min 15 min  15 min  15 min                                          Assessment: Tolerated treatment well  PT notes the patient able to tolerate increase TE with the decrease symptoms but PT did modify treatment secondary to not exacerbate symptoms and will continue to progress toward therapy goals and full TE session as tolerated by patient  Plan: Continue per plan of care

## 2018-07-02 ENCOUNTER — OFFICE VISIT (OUTPATIENT)
Dept: PHYSICAL THERAPY | Facility: CLINIC | Age: 63
End: 2018-07-02
Payer: MEDICARE

## 2018-07-02 DIAGNOSIS — M17.0 PRIMARY OSTEOARTHRITIS OF BOTH KNEES: Primary | ICD-10-CM

## 2018-07-02 DIAGNOSIS — M79.7 FIBROMYALGIA: ICD-10-CM

## 2018-07-02 PROCEDURE — 97112 NEUROMUSCULAR REEDUCATION: CPT | Performed by: PHYSICAL THERAPIST

## 2018-07-02 PROCEDURE — 97150 GROUP THERAPEUTIC PROCEDURES: CPT | Performed by: PHYSICAL THERAPIST

## 2018-07-02 PROCEDURE — 97140 MANUAL THERAPY 1/> REGIONS: CPT | Performed by: PHYSICAL THERAPIST

## 2018-07-02 NOTE — PROGRESS NOTES
Daily Note     Today's date: 2018  Patient name: Mirella Sy  : 1955  MRN: [id number]  Referring provider: Placido Flowers MD  Dx:   Encounter Diagnosis     ICD-10-CM    1  Primary osteoarthritis of both knees M17 0    2  Fibromyalgia M79 7                   Subjective:  Patient reports the legs continue to feel good after the pain meds from PCP  Patient reports she has one day left in her medrol dose pack and hopes the pain will continue to be lower after the meds wear off  Patient reports 3/10 pain levels in the legs at the start and end of the session         Objective: See treatment diary below    Manual  18 7/2   STM and TPR prn Bilat knee and LE stretching  With manual  LE traction  15 min NP 2* pain Bilat knee and LE stretching with manual LE traction  15 min  15 min  15 min                                                                  Exercise Diary  18 7/2   NuStep L3 10 min L3 10 min 10 min L2  10 min L2 10 min  L3   Standing SLR 3-way           Monster Walks   4x10 Feet  Light Blue   4x10 Feet Green  6x10 Feet Green    Side Step and Squat   4x10 Feet  Light Blue   4x10 Feet Green  6x10 Feet Green    Fwd/Lat Step Ups            Step Up and Over         10x Bilat   4" step    Tandem on Foam      4x10" hold   Bilat    Single Leg Stance       Bridge with Tball 10x   Orange Ball      Tandem Walk on Foam 4x10' 4x10 Feet         Biodex LOS            Biodex LOS            Biodex Random            Biodex Random            Biodex Maze            Biodex Maze            Gait Training            Stair HR/TR             Stair gastroc stretch  10x10" ea 10x10" each   5x10" hold Bilat  15x5" Hold   Bilat    LTR with Tball  10x5"hold  Bilat 2x10 Bilat  5" hold   2x10 Bilat      DKTC with Tball  10x5"hold 2x10  5" hold    2x10  2x10  Orange Tball          Modalities 18 7/2   MHP to Low Back and B/L Knees in seated 15 min 15 min 15 min 15 min  15 min                                    Assessment: Tolerated treatment well  PT notes continuation of progression of TE program with focus on gait/balance and manual therapy to decrease pain levels and improve functional limitations to meet therapy goals  PT notes the decrease symptoms with the medrol dose pack has assisted with PT ability to progress with POC to meet therapy goals  Plan: Continue per plan of care

## 2018-07-03 ENCOUNTER — EVALUATION (OUTPATIENT)
Dept: PHYSICAL THERAPY | Facility: CLINIC | Age: 63
End: 2018-07-03
Payer: MEDICARE

## 2018-07-03 DIAGNOSIS — M17.0 PRIMARY OSTEOARTHRITIS OF BOTH KNEES: Primary | ICD-10-CM

## 2018-07-03 DIAGNOSIS — M79.7 FIBROMYALGIA: ICD-10-CM

## 2018-07-03 PROCEDURE — 97112 NEUROMUSCULAR REEDUCATION: CPT | Performed by: PHYSICAL THERAPIST

## 2018-07-03 PROCEDURE — G8978 MOBILITY CURRENT STATUS: HCPCS | Performed by: PHYSICAL THERAPIST

## 2018-07-03 PROCEDURE — 97140 MANUAL THERAPY 1/> REGIONS: CPT | Performed by: PHYSICAL THERAPIST

## 2018-07-03 PROCEDURE — G8979 MOBILITY GOAL STATUS: HCPCS | Performed by: PHYSICAL THERAPIST

## 2018-07-03 PROCEDURE — 97150 GROUP THERAPEUTIC PROCEDURES: CPT | Performed by: PHYSICAL THERAPIST

## 2018-07-03 NOTE — LETTER
2018    MD [address] [place] [address]    Patient: Arielle Moreno   YOB: 1955   Date of Visit: 7/3/2018     Encounter Diagnosis     ICD-10-CM    1  Primary osteoarthritis of both knees M17 0    2  Fibromyalgia M79 7        Dear Dr Aristides Baum:    Please review the attached Plan of Care from [facility] recent visit  Please verify that you agree therapy should continue by signing the attached document and sending it back to our office  If you have any questions or concerns, please don't hesitate to call  Sincerely,    Heaven Flores PT      Referring Provider:      I certify that I have read the below Plan of Care and certify the need for these services furnished under this plan of treatment while under my care  Darshan Fields MD  [facility]: [phone]          PT Re-Evaluation     Today's date: 7/3/2018  Patient name: Arielle Moreno  : 1955  MRN: [id number]  Referring provider: Gracia Arguelles MD  Dx:   Encounter Diagnosis     ICD-10-CM    1  Primary osteoarthritis of both knees M17 0    2  Fibromyalgia M79 7                   Assessment  Impairments: abnormal gait, activity intolerance, impaired balance, impaired physical strength, lacks appropriate home exercise program, pain with function and poor posture     Assessment details: Pt presents with chief complaint of B/L knee OA and chronic history of fibromyalgia  PT notes the pt the patient with improved LE strength but continuation of demonstration of antalgic gait pattern and balance with increase pain levels and functional limitations with need for continuation of skilled therapy for 6 weeks with focus on gait/balance, strengthening, manual therapy, posture, analgesic modalities, and update/review of HEP  PT notes the patient's PMH may prolong course of skilled therapy and/or impair prognosis     Understanding of Dx/Px/POC: good   Prognosis: good    Goals  STG  1  Pt will decrease pain by 25-50%-Partial MET  2  Pt will demonstrate improved gait mechanics-Partial MET    3  Initiate HEP-MET    LTG  1  Pt will improve BLE mm strength by 1-2 mm grades-Partial MET   2  Pt will demonstrate improved standing balance-Partial MET   3  Pt will ambulate safely without AD use for community distances-Partial MET   4   DC with HEP-NOT MET        Plan  Patient would benefit from: PT eval and skilled physical therapy  Planned modality interventions: unattended electrical stimulation and thermotherapy: hydrocollator packs  Planned therapy interventions: abdominal trunk stabilization, balance, flexibility, gait training, graded exercise, home exercise program, manual therapy, massage, neuromuscular re-education, patient education, postural training, strengthening, stretching and therapeutic exercise  Frequency: 3x week  Duration in visits: 12  Duration in weeks: 4  Plan of Care beginning date: 7/3/2018  Plan of Care expiration date: 8/3/2018  Treatment plan discussed with: patient  Plan details: PT notes review of POC and findings with patient who is in agreement with PT recommendations of continuation of skilled therapy  Subjective Evaluation    History of Present Illness  Mechanism of injury: Pt presents with chief complaint of B/L knee OA and chronic hx of fibromyalgia  Pt was seen by MD on 6/4 and was prescribed a course of skilled PT to treat current symptoms  Pt reports she is receivng Euflexxa injections but feels that they are no longer being effective  Pt is scheduled to receive last Euflexxa injection tomorrow  Pt has been using SPC for approximately 6 months, starting with occasional use for long distances and now is typically all the time, primarily for balance and assistance with gait  Pt notes majority of symptoms in R knee, with tightness in BLE mm, especially in the posterior chain   Pt also notes TrPs along medial aspect of B/L knee and a feeling of fluid accumulation in B/L knees  Presently the patient has attended 10 sessions of skilled therapy and feels 70-80% improvement with overall increase strength in the legs and some decrease in intensity and duration of symptoms in the low back and LE  Patient reports she is happy with current progress but feels she can get stronger in the legs and wants to work on making walking, stair climbing and ADL easier  Recurrent probem    Quality of life: good    Pain  Current pain ratin  At best pain rating: 3  At worst pain rating: 10  Location: Bilateral lower extremity  Quality: tight, dull ache and sharp  Relieving factors: rest, heat and medications  Aggravating factors: walking and stair climbing (overexertion, weather)  Progression: improved    Social Support    Employment status: not working    Diagnostic Tests  X-ray: abnormal  Treatments  Previous treatment: injection treatment, physical therapy and massage (massage has helped, chiropractor has not)  Patient Goals  Patient goals for therapy: decreased pain, improved balance, increased motion and increased strength  Patient goal: to walk without cane with minimal pain        Objective     Postural Observations  Seated posture: fair  Standing posture: fair        Palpation   Left   Tenderness of the erector spinae, distal biceps femoris, distal semimembranosus, distal semitendinosus, gluteus christiano, gluteus medius and lumbar paraspinals  Right Tenderness of the erector spinae, distal biceps femoris, distal semimembranosus, distal semitendinosus, gluteus christiano, gluteus medius, lateral gastrocnemius, lumbar paraspinals, proximal biceps femoris, proximal semimembranosus and proximal semitendinosus  Additional Palpation Details  PT notes the patient with + pain over the distal HS and anterior tibialis area of the bilateral LE       Tenderness     Left Hip   Tenderness in the PSIS and greater trochanter       Right Hip Tenderness in the PSIS, greater trochanter and ischial tuberosity  Right Knee   Tenderness in the lateral joint line  Neurological Testing     Reflexes   Left   Patellar (L4): normal (2+)  Achilles (S1): normal (2+)    Right   Patellar (L4): normal (2+)  Achilles (S1): normal (2+)    Active Range of Motion   Left Hip   Flexion: 63 degrees     Right Hip   Flexion: 59 degrees     Additional Active Range of Motion Details  PT notes the pt with WNL AROM of the L/S and BLE    Strength/Myotome Testing     Left Hip   Planes of Motion   Flexion: 4  Extension: 4+  Abduction: 4+  Adduction: 5    Right Hip   Planes of Motion   Flexion: 4-  Extension: 4+  Abduction: 4+  Adduction: 5    Left Knee   Flexion: 4+  Extension: 4    Right Knee   Flexion: 4+  Extension: 4-    Left Ankle/Foot   Dorsiflexion: 5  Plantar flexion: 5    Right Ankle/Foot   Dorsiflexion: 5  Plantar flexion: 5    Additional Strength Details  Pain with b/l resisted knee ext and R hip flexion (mm tightness)    Tests     Additional Tests Details  PT notes the pt with WNL ligament stability B/L, (-) meniscal testing, and equal mm activation    Ambulation   Weight-Bearing Status   Assistive device used: single point cane and none    Additional Weight-Bearing Status Details  Patient depending on pain levels may require a SPC with ambulation if pain levels are higher    Ambulation: Level Surfaces   Ambulation with assistive device: independent    Ambulation: Stairs   Pattern: non-reciprocal  Railings: one rail  Pattern: non-reciprocal  Railings: one rail    Observational Gait   Gait: antalgic   Decreased right stance time and right step length  Quality of Movement During Gait   Trunk  Forward lean  Pelvis    Pelvis (Left): Negative Trendelenburg  Pelvis (Right): Negative Trendelenburg         Flowsheet Rows      Most Recent Value   PT/OT G-Codes   FOTO information reviewed  Yes   Assessment Type  Re-evaluation   G code set  Mobility: Walking & Moving Around   Mobility: Walking and Moving Around Current Status ()  CK   Mobility: Walking and Moving Around Goal Status ()  CJ          Precautions B/L Knee OA; Fibromyalgia    Manual  7/3 6/26/18 6/27 6/29 7/2   STM and TPR prn Bilat knee and LE stretching  With manual  LE traction  15 min NP 2* pain Bilat knee and LE stretching with manual LE traction  15 min  15 min  15 min                                                                  Exercise Diary  7/3 6/26/18 6/27 6/29 7/2   NuStep L4 10 min L3 10 min 10 min L2  10 min L2 10 min  L3   Standing SLR 3-way           Monster Walks  6x10 Feet Green  4x10 Feet  Light Blue   4x10 Feet Green  6x10 Feet Green    Side Step and Squat  6x10 Feet Green  4x10 Feet  Light Blue   4x10 Feet Green  6x10 Feet Green    Fwd/Lat Step Ups            Step Up and Over 10x Bilat   4" step        10x Bilat   4" step             Single Leg Stance       Bridge with Tball 10x   Orange Ball     Tandem Walk on Foam 6x10 Feet 4x10 Feet         Biodex LOS            Biodex LOS            Biodex Random            Biodex Random            Biodex Maze            Biodex Maze            Gait Training            Stair HR/TR             Stair gastroc stretch  5x15" Hold Bilat  10x10" each   5x10" hold Bilat  15x5" Hold   Bilat    LTR with Tball  10x5"hold  Bilat 2x10 Bilat  5" hold   2x10 Bilat      DKTC with Tball  10x5"hold 2x10  5" hold    2x10  2x10  Orange Tball          Modalities 7/3  6/26/18 6/27 6/29 7/2   MHP to Low Back and B/L Knees in seated 15 min 15 min 15 min  15 min  15 min

## 2018-07-03 NOTE — PROGRESS NOTES
PT Re-Evaluation     Today's date: 7/3/2018  Patient name: Jeff Chaidez  : 1955  MRN: [id number]  Referring provider: Merly Yoon MD  Dx:   Encounter Diagnosis     ICD-10-CM    1  Primary osteoarthritis of both knees M17 0    2  Fibromyalgia M79 7                   Assessment  Impairments: abnormal gait, activity intolerance, impaired balance, impaired physical strength, lacks appropriate home exercise program, pain with function and poor posture     Assessment details: Pt presents with chief complaint of B/L knee OA and chronic history of fibromyalgia  PT notes the pt the patient with improved LE strength but continuation of demonstration of antalgic gait pattern and balance with increase pain levels and functional limitations with need for continuation of skilled therapy for 6 weeks with focus on gait/balance, strengthening, manual therapy, posture, analgesic modalities, and update/review of HEP  PT notes the patient's PMH may prolong course of skilled therapy and/or impair prognosis  Understanding of Dx/Px/POC: good   Prognosis: good    Goals  STG  1  Pt will decrease pain by 25-50%-Partial MET  2  Pt will demonstrate improved gait mechanics-Partial MET    3  Initiate HEP-MET    LTG  1  Pt will improve BLE mm strength by 1-2 mm grades-Partial MET   2  Pt will demonstrate improved standing balance-Partial MET   3   Pt will ambulate safely without AD use for community distances-Partial MET   4   DC with HEP-NOT MET        Plan  Patient would benefit from: PT eval and skilled physical therapy  Planned modality interventions: unattended electrical stimulation and thermotherapy: hydrocollator packs  Planned therapy interventions: abdominal trunk stabilization, balance, flexibility, gait training, graded exercise, home exercise program, manual therapy, massage, neuromuscular re-education, patient education, postural training, strengthening, stretching and therapeutic exercise  Frequency: 3x week  Duration in visits: 12  Duration in weeks: 4  Plan of Care beginning date: 7/3/2018  Plan of Care expiration date: 8/3/2018  Treatment plan discussed with: patient  Plan details: PT notes review of POC and findings with patient who is in agreement with PT recommendations of continuation of skilled therapy  Subjective Evaluation    History of Present Illness  Mechanism of injury: Pt presents with chief complaint of B/L knee OA and chronic hx of fibromyalgia  Pt was seen by MD on  and was prescribed a course of skilled PT to treat current symptoms  Pt reports she is receivng Euflexxa injections but feels that they are no longer being effective  Pt is scheduled to receive last Euflexxa injection tomorrow  Pt has been using SPC for approximately 6 months, starting with occasional use for long distances and now is typically all the time, primarily for balance and assistance with gait  Pt notes majority of symptoms in R knee, with tightness in BLE mm, especially in the posterior chain  Pt also notes TrPs along medial aspect of B/L knee and a feeling of fluid accumulation in B/L knees  Presently the patient has attended 10 sessions of skilled therapy and feels 70-80% improvement with overall increase strength in the legs and some decrease in intensity and duration of symptoms in the low back and LE  Patient reports she is happy with current progress but feels she can get stronger in the legs and wants to work on making walking, stair climbing and ADL easier     Recurrent probem    Quality of life: good    Pain  Current pain ratin  At best pain rating: 3  At worst pain rating: 10  Location: Bilateral lower extremity  Quality: tight, dull ache and sharp  Relieving factors: rest, heat and medications  Aggravating factors: walking and stair climbing (overexertion, weather)  Progression: improved    Social Support    Employment status: not working    Diagnostic Tests  X-ray: abnormal  Treatments  Previous treatment: injection treatment, physical therapy and massage (massage has helped, chiropractor has not)  Patient Goals  Patient goals for therapy: decreased pain, improved balance, increased motion and increased strength  Patient goal: to walk without cane with minimal pain        Objective     Postural Observations  Seated posture: fair  Standing posture: fair        Palpation   Left   Tenderness of the erector spinae, distal biceps femoris, distal semimembranosus, distal semitendinosus, gluteus christiano, gluteus medius and lumbar paraspinals  Right Tenderness of the erector spinae, distal biceps femoris, distal semimembranosus, distal semitendinosus, gluteus christiano, gluteus medius, lateral gastrocnemius, lumbar paraspinals, proximal biceps femoris, proximal semimembranosus and proximal semitendinosus  Additional Palpation Details  PT notes the patient with + pain over the distal HS and anterior tibialis area of the bilateral LE       Tenderness     Left Hip   Tenderness in the PSIS and greater trochanter  Right Hip   Tenderness in the PSIS, greater trochanter and ischial tuberosity  Right Knee   Tenderness in the lateral joint line       Neurological Testing     Reflexes   Left   Patellar (L4): normal (2+)  Achilles (S1): normal (2+)    Right   Patellar (L4): normal (2+)  Achilles (S1): normal (2+)    Active Range of Motion   Left Hip   Flexion: 63 degrees     Right Hip   Flexion: 59 degrees     Additional Active Range of Motion Details  PT notes the pt with WNL AROM of the L/S and BLE    Strength/Myotome Testing     Left Hip   Planes of Motion   Flexion: 4  Extension: 4+  Abduction: 4+  Adduction: 5    Right Hip   Planes of Motion   Flexion: 4-  Extension: 4+  Abduction: 4+  Adduction: 5    Left Knee   Flexion: 4+  Extension: 4    Right Knee   Flexion: 4+  Extension: 4-    Left Ankle/Foot   Dorsiflexion: 5  Plantar flexion: 5    Right Ankle/Foot   Dorsiflexion: 5  Plantar flexion: 5    Additional Strength Details  Pain with b/l resisted knee ext and R hip flexion (mm tightness)    Tests     Additional Tests Details  PT notes the pt with WNL ligament stability B/L, (-) meniscal testing, and equal mm activation    Ambulation   Weight-Bearing Status   Assistive device used: single point cane and none    Additional Weight-Bearing Status Details  Patient depending on pain levels may require a SPC with ambulation if pain levels are higher    Ambulation: Level Surfaces   Ambulation with assistive device: independent    Ambulation: Stairs   Pattern: non-reciprocal  Railings: one rail  Pattern: non-reciprocal  Railings: one rail    Observational Gait   Gait: antalgic   Decreased right stance time and right step length  Quality of Movement During Gait   Trunk  Forward lean  Pelvis    Pelvis (Left): Negative Trendelenburg  Pelvis (Right): Negative Trendelenburg         Flowsheet Rows      Most Recent Value   PT/OT G-Codes   FOTO information reviewed  Yes   Assessment Type  Re-evaluation   G code set  Mobility: Walking & Moving Around   Mobility: Walking and Moving Around Current Status ()  CK   Mobility: Walking and Moving Around Goal Status ()  CJ          Precautions B/L Knee OA; Fibromyalgia    Manual  7/3 6/26/18 6/27 6/29 7/2   STM and TPR prn Bilat knee and LE stretching  With manual  LE traction  15 min NP 2* pain Bilat knee and LE stretching with manual LE traction  15 min  15 min  15 min                                                                  Exercise Diary  7/3 6/26/18 6/27 6/29 7/2   NuStep L4 10 min L3 10 min 10 min L2  10 min L2 10 min  L3   Standing SLR 3-way           Monster Walks  6x10 Feet Green  4x10 Feet  Light Blue   4x10 Feet Green  6x10 Feet Green    Side Step and Squat  6x10 Feet Green  4x10 Feet  Light Blue   4x10 Feet Green  6x10 Feet Green    Fwd/Lat Step Ups            Step Up and Over 10x Bilat   4" step        10x Bilat   4" step             Single Leg Stance      Bridge with Tball 10x   Orange Ball     Tandem Walk on Foam 6x10 Feet 4x10 Feet         Biodex LOS            Biodex LOS            Biodex Random            Biodex Random            Biodex Maze            Biodex Maze            Gait Training            Stair HR/TR             Stair gastroc stretch  5x15" Hold Bilat  10x10" each   5x10" hold Bilat  15x5" Hold   Bilat    LTR with Tball  10x5"hold  Bilat 2x10 Bilat  5" hold   2x10 Bilat      DKTC with Tball  10x5"hold 2x10  5" hold    2x10  2x10  Orange Tball          Modalities 7/3  6/26/18 6/27 6/29 7/2   MHP to Low Back and B/L Knees in seated 15 min 15 min 15 min  15 min  15 min

## 2018-07-06 ENCOUNTER — OFFICE VISIT (OUTPATIENT)
Dept: PHYSICAL THERAPY | Facility: CLINIC | Age: 63
End: 2018-07-06
Payer: MEDICARE

## 2018-07-06 DIAGNOSIS — M79.7 FIBROMYALGIA: ICD-10-CM

## 2018-07-06 DIAGNOSIS — M17.0 PRIMARY OSTEOARTHRITIS OF BOTH KNEES: Primary | ICD-10-CM

## 2018-07-06 PROCEDURE — 97112 NEUROMUSCULAR REEDUCATION: CPT | Performed by: PHYSICAL THERAPIST

## 2018-07-06 PROCEDURE — 97150 GROUP THERAPEUTIC PROCEDURES: CPT | Performed by: PHYSICAL THERAPIST

## 2018-07-06 PROCEDURE — 97140 MANUAL THERAPY 1/> REGIONS: CPT | Performed by: PHYSICAL THERAPIST

## 2018-07-06 PROCEDURE — 97110 THERAPEUTIC EXERCISES: CPT | Performed by: PHYSICAL THERAPIST

## 2018-07-06 NOTE — PROGRESS NOTES
Daily Note     Today's date: 2018  Patient name: Gale Sims  : 1955  MRN: [id number]  Referring provider: Dany Jaime MD  Dx:   Encounter Diagnosis     ICD-10-CM    1  Primary osteoarthritis of both knees M17 0    2  Fibromyalgia M79 7                   Subjective:  Patient reports the legs are not too bad this morning with overall decrease pain levels and increase ease with standing and walking activities  Patient reports 5/10 pain levels at the start of the session  Patient reports feeling better post session with 4/10 pain levels reported         Objective: See treatment diary below    Precautions B/L Knee OA; Fibromyalgia    Manual  7/3 7/6 6/27 6/29 7   STM and TPR prn Bilat knee and LE stretching  With manual  LE traction  15 min  Bilat knee and LE stretching with manual LE traction  15 min  15 min  15 min                                                                  Exercise Diary  7/3 7/6 6/27 6/29 7/2   NuStep L4 10 min L4 10 min 10 min L2  10 min L2 10 min  L3   Leg and calf press    2x10 20#         Monster Walks  6x10 Feet Green  6x10 Feet   Green    4x10 Feet Green  6x10 Feet Green    Side Step and Squat  6x10 Feet Green  6x10 Feet Green    4x10 Feet Green  6x10 Feet Green    Fwd/Lat Step Ups            Step Up and Over 10x Bilat   4" step  15x Bilat   4" step      10x Bilat   4" step             Single Leg Stance       Bridge with Tball 10x   Orange Ball     Tandem Walk on Foam 6x10 Feet Tandem and side step walk   4x10 Feet          Biodex LOS            Biodex LOS            Biodex Random            Biodex Random            Biodex Maze            Biodex Maze            Gait Training            Stair HR/TR             Stair gastroc stretch  5x15" Hold Bilat  5x15" Hold Bilat    5x10" hold Bilat  15x5" Hold   Bilat    LTR with Tball  10x5"hold  Bilat    2x10 Bilat      DKTC with Tball  10x5"hold     2x10  2x10  Orange Tball          Modalities 7/3  7/6 6/27 6/29 7   MHP to Low Back and B/L Knees in seated 15 min 15 min 15 min  15 min  15 min                                          Assessment: Tolerated treatment well  PT notes continuation of progression of TE program with focus on gait/balance and manual therapy to decrease pain levels and improve functional limitations to meet therapy goals  PT notes addition of side step on foam and leg/calf press to POC to address strength and balance deficits to meet therapy goals  Plan: Continue per plan of care

## 2018-07-16 ENCOUNTER — OFFICE VISIT (OUTPATIENT)
Dept: PHYSICAL THERAPY | Facility: CLINIC | Age: 63
End: 2018-07-16
Payer: MEDICARE

## 2018-07-16 DIAGNOSIS — M79.7 FIBROMYALGIA: ICD-10-CM

## 2018-07-16 DIAGNOSIS — M17.0 PRIMARY OSTEOARTHRITIS OF BOTH KNEES: Primary | ICD-10-CM

## 2018-07-16 PROCEDURE — 97140 MANUAL THERAPY 1/> REGIONS: CPT | Performed by: PHYSICAL THERAPIST

## 2018-07-16 PROCEDURE — 97150 GROUP THERAPEUTIC PROCEDURES: CPT | Performed by: PHYSICAL THERAPIST

## 2018-07-16 NOTE — PROGRESS NOTES
Daily Note     Today's date: 2018  Patient name: Corby Cisneros  : 1955  MRN: [id number]  Referring provider: Florin Feliciano MD  Dx:   Encounter Diagnosis     ICD-10-CM    1  Primary osteoarthritis of both knees M17 0    2  Fibromyalgia M79 7                   Subjective:  Patient reports having some pain in the knee after being on vacation last week  Patient reports walking on the beach was difficult for the patient  Patient reports she is trying to use the OA knee brace as per the MD orders but is having a hard time with the brace  Patient reports 5/10 pain levels in the knees at the start of the session and 3/10 pain levels at the end of the session          Objective: See treatment diary below    Precautions B/L Knee OA; Fibromyalgia    Manual  7/3 7/16  6/27 6/29 7   STM and TPR prn Bilat knee and LE stretching  With manual  LE traction  15 min 15 in  Bilat knee and LE stretching with manual LE traction  15 min  15 min  15 min                                                                  Exercise Diary  7/3 7/16 6/27 6/29 7   NuStep L4 10 min L5 10 min 10 min L2  10 min L2 10 min  L3   Leg and calf press           Monster Walks  6x10 Feet Green  6x10 Feet   Green    4x10 Feet Green  6x10 Feet Green    Side Step and Squat  6x10 Feet Green  6x10 Feet Green   4x10 Feet Green  6x10 Feet Green    Fwd/Lat Step Ups            Step Up and Over 10x Bilat   4" step  2x10 Bilat   4" step      10x Bilat   4" step             Single Leg Stance       Bridge with Tball 10x   Orange Ball     Tandem Walk on Foam 6x10 Feet           Biodex LOS            Biodex LOS            Biodex Random            Biodex Random            Biodex Maze            Biodex Maze            Gait Training            Stair HR/TR             Stair gastroc stretch  5x15" Hold Bilat  5x15" Hold Bilat    5x10" hold Bilat  15x5" Hold   Bilat    LTR with Tball  10x5"hold  Bilat 10x5" hold Bilat    2x10 Bilat      DKTC with Tball 10x5"hold 10x5" Hold     2x10  2x10  Orange Tball          Modalities 7/3  7/16 6/27 6/29 7/2   MHP to Low Back and B/L Knees in seated 15 min 15 min 15 min  15 min  15 min                                        Assessment: Tolerated treatment fair  PT notes modified treatment secondary to increase pain levels in the bilateral knees but PT will continue to progress toward therapy goals and full TE session as tolerated by patient  Plan: Continue per plan of care

## 2018-07-18 ENCOUNTER — APPOINTMENT (OUTPATIENT)
Dept: PHYSICAL THERAPY | Facility: CLINIC | Age: 63
End: 2018-07-18
Payer: MEDICARE

## 2018-07-19 ENCOUNTER — OFFICE VISIT (OUTPATIENT)
Dept: PHYSICAL THERAPY | Facility: CLINIC | Age: 63
End: 2018-07-19
Payer: MEDICARE

## 2018-07-19 DIAGNOSIS — M17.0 PRIMARY OSTEOARTHRITIS OF BOTH KNEES: Primary | ICD-10-CM

## 2018-07-19 DIAGNOSIS — M79.7 FIBROMYALGIA: ICD-10-CM

## 2018-07-19 PROCEDURE — 97535 SELF CARE MNGMENT TRAINING: CPT | Performed by: PHYSICAL THERAPIST

## 2018-07-19 PROCEDURE — 97140 MANUAL THERAPY 1/> REGIONS: CPT | Performed by: PHYSICAL THERAPIST

## 2018-07-19 PROCEDURE — 97150 GROUP THERAPEUTIC PROCEDURES: CPT | Performed by: PHYSICAL THERAPIST

## 2018-07-19 NOTE — PROGRESS NOTES
Daily Note     Today's date: 2018  Patient name: Qasim Reynolds  : 1955  MRN: [id number]  Referring provider: Ishaan Armenta MD  Dx:   Encounter Diagnosis     ICD-10-CM    1  Primary osteoarthritis of both knees M17 0    2  Fibromyalgia M79 7                   Subjective:  Patient expresses concern about feeling muscle/fascial tightness in the right knee and LE  Patient questions if there is anything she is doing wrong to cause this tightness  Patient is frustrated by variety of symptoms and flare-up with fibromyalgia  Patient reports 6/10 pain levels in the knees at the start of the session  Post session, the patient reports 3/10 pain levels in the knees          Objective: See treatment diary below    Precautions B/L Knee OA; Fibromyalgia    Manual  7/3 7/16  7/19     STM and TPR prn Bilat knee and LE stretching  With manual  LE traction  15 min 15 in  Bilat knee and LE stretching with manual LE traction  15 min                                                                    Exercise Diary  7/3 7/16 7/19     NuStep L4 10 min L5 10 min 10 min L5     Leg and calf press         Monster Walks  6x10 Feet Green  6x10 Feet   Green  6x10 Feet  Green      Side Step and Squat  6x10 Feet Green  6x10 Feet Green 6x10  Feet   Green      Fwd/Lat Step Ups          Step Up and Over 10x Bilat   4" step  2x10 Bilat   4" step  13X Bilat   6" step               Single Leg Stance     DD seated trunk rotation and D1/D2 Flex  2x10  Red Ball      Tandem Walk on Foam 6x10 Feet          Biodex LOS            Biodex LOS            Biodex Random            Biodex Random            Biodex Maze            Biodex Maze            Gait Training            Stair HR/TR             Stair gastroc stretch  5x15" Hold Bilat  5x15" Hold Bilat   5x15" Hold      LTR with Tball  10x5"hold  Bilat 10x5" hold Bilat   10x5" hold      DKTC with Tball  10x5"hold 10x5" Hold              Modalities 7/3  7/16 7/19      MHP to Low Back and B/L Knees in seated 15 min 15 min 15 min                                          Assessment: Tolerated treatment fair  PT notes modified treatment secondary patient subjective comments but PT spoke with patient about concerns and addressed with explanation of degenerative changes in the right knee and PMH of fibromyalgia contributing to prolonged symptoms  PT will continue to progress toward therapy goals full TE session as tolerated by patient  Plan: Continue per plan of care

## 2018-07-23 ENCOUNTER — OFFICE VISIT (OUTPATIENT)
Dept: PHYSICAL THERAPY | Facility: CLINIC | Age: 63
End: 2018-07-23
Payer: MEDICARE

## 2018-07-23 ENCOUNTER — APPOINTMENT (OUTPATIENT)
Dept: PHYSICAL THERAPY | Facility: CLINIC | Age: 63
End: 2018-07-23
Payer: MEDICARE

## 2018-07-23 DIAGNOSIS — M17.0 PRIMARY OSTEOARTHRITIS OF BOTH KNEES: Primary | ICD-10-CM

## 2018-07-23 DIAGNOSIS — M79.7 FIBROMYALGIA: ICD-10-CM

## 2018-07-23 PROCEDURE — 97110 THERAPEUTIC EXERCISES: CPT

## 2018-07-23 PROCEDURE — 97140 MANUAL THERAPY 1/> REGIONS: CPT

## 2018-07-23 NOTE — PROGRESS NOTES
Daily Note     Today's date: 2018  Patient name: Karina Díaz  : 1955  MRN: [id number]  Referring provider: Percy Gonsalez MD  Dx: No diagnosis found  Subjective: Pt  Reports she was so sore after her last treatment  The weekend consisted of stretching and hot packs  Pt  Is feeling better today, but does not want to overdue it  Objective: See treatment diary below    Precautions B/L Knee OA; Fibromyalgia    Manual  7/3 7/16  7/19 7/23    STM and TPR prn Bilat knee and LE stretching  With manual  LE traction  15 min 15 in  Bilat knee and LE stretching with manual LE traction  15 min  15 '                                                                  Exercise Diary  7/3 7/16 7/19 7/23    NuStep L4 10 min L5 10 min 10 min L5 10' L5    Leg and calf press         Monster Walks  6x10 Feet Green  6x10 Feet   Green  6x10 Feet  Green  6x10'   Green    Side Step and Squat  6x10 Feet Green  6x10 Feet Green 6x10  Feet   Green  6x10'  Green    Fwd/Lat Step Ups          Step Up and Over 10x Bilat   4" step  2x10 Bilat   4" step  13X Bilat   6" step  2x13  6"step             Single Leg Stance     DD seated trunk rotation and D1/D2 Flex  2x10  Red Ball  DD seated trunk rotation  And D1/D2  Flex 2x12  Red ball    Tandem Walk on Foam 6x10 Feet     6x10'     Biodex LOS            Biodex LOS            Biodex Random            Biodex Random            Biodex Maze            Biodex Maze            Gait Training            Stair HR/TR             Stair gastroc stretch  5x15" Hold Bilat  5x15" Hold Bilat   5x15" Hold  5x15" hold  bilat    LTR with Tball  10x5"hold  Bilat 10x5" hold Bilat   10x5" hold  10x5"hold    DKTC with Tball  10x5"hold 10x5" Hold    10x5"hold          Modalities 7/3  7/16 7/19  7/23    MHP to Low Back and B/L Knees in seated 15 min 15 min 15 min  15 min                                      Assessment: Tolerated treatment well   Patient demonstrated fatigue post treatment and exhibited good technique with therapeutic exercises      Plan: Progress treatment as tolerated

## 2018-07-25 ENCOUNTER — OFFICE VISIT (OUTPATIENT)
Dept: PHYSICAL THERAPY | Facility: CLINIC | Age: 63
End: 2018-07-25
Payer: MEDICARE

## 2018-07-25 ENCOUNTER — APPOINTMENT (OUTPATIENT)
Dept: PHYSICAL THERAPY | Facility: CLINIC | Age: 63
End: 2018-07-25
Payer: MEDICARE

## 2018-07-25 DIAGNOSIS — M17.0 PRIMARY OSTEOARTHRITIS OF BOTH KNEES: Primary | ICD-10-CM

## 2018-07-25 DIAGNOSIS — M79.7 FIBROMYALGIA: ICD-10-CM

## 2018-07-25 PROCEDURE — 97150 GROUP THERAPEUTIC PROCEDURES: CPT | Performed by: PHYSICAL THERAPIST

## 2018-07-25 PROCEDURE — 97110 THERAPEUTIC EXERCISES: CPT | Performed by: PHYSICAL THERAPIST

## 2018-07-25 PROCEDURE — 97112 NEUROMUSCULAR REEDUCATION: CPT | Performed by: PHYSICAL THERAPIST

## 2018-07-25 PROCEDURE — 97140 MANUAL THERAPY 1/> REGIONS: CPT | Performed by: PHYSICAL THERAPIST

## 2018-07-25 NOTE — PROGRESS NOTES
Daily Note     Today's date: 2018  Patient name: Michael Hudson  : 1955  MRN: [id number]  Referring provider: Dasha Clark MD  Dx:   Encounter Diagnosis     ICD-10-CM    1  Primary osteoarthritis of both knees M17 0    2  Fibromyalgia M79 7                   Subjective:  Patient reports having a good workout last session with minimal increase in symptoms  Patient reports she is happy that pain levels are down overall even with the rainy/damp weather  Patient reports 4/10 pain levels in the knees at the start of the session and 2/10 pain levels post session          Objective: See treatment diary below    Precautions B/L Knee OA; Fibromyalgia    Manual  7/3 7/16  7/19 7/23 7/25   STM and TPR prn Bilat knee and LE stretching  With manual  LE traction  15 min 15 in  Bilat knee and LE stretching with manual LE traction  15 min  15 ' 15 min                                                                  Exercise Diary  7/3 7/16 7/19 7/23 7/25   NuStep L4 10 min L5 10 min 10 min L5 10' L5 10 min L5    Leg and calf press      20#  2x10    Monster Walks  6x10 Feet Green  6x10 Feet   Green  6x10 Feet  Green  6x10'   Green 6x10 Feet   Green    Side Step and Squat  6x10 Feet Green  6x10 Feet Green 6x10  Feet   Green  6x10'  Green 6x10 Feet   Green    Fwd/Lat Step Ups          Step Up and Over 10x Bilat   4" step  2x10 Bilat   4" step  13X Bilat   6" step  2x13  6"step 2x10 Bilat   6" step             Single Leg Stance     DD seated trunk rotation and D1/D2 Flex  2x10  Red Ball  DD seated trunk rotation  And D1/D2  Flex 2x12  Red ball DD seated Trunk rotation and D1/D2 Flex   2x10   Red Ball    Tandem Walk on Foam 6x10 Feet     6x10'  6x10 Feet    Biodex LOS            Biodex LOS            Biodex Random            Biodex Random            Biodex Maze            Biodex Maze            Gait Training            Stair HR/TR             Stair gastroc stretch  5x15" Hold Bilat  5x15" Hold Bilat   5x15" Hold  5x15" hold  bilat 5x15" Hold Bilat    LTR with Tball  10x5"hold  Bilat 10x5" hold Bilat   10x5" hold  10x5"hold 10x5" hold    DKTC with Tball  10x5"hold 10x5" Hold    10x5"hold 10x5" hold Bilat          Modalities 7/3  7/16 7/19  7/23 7/25   MHP to Low Back and B/L Knees in seated 15 min 15 min 15 min  15 min 15 min                                          Assessment: Tolerated treatment well  PT notes continuation of progression of TE program with focus on gait/balance and manual therapy to decrease pain levels and improve functional limitations to meet therapy goals  PT notes the patient able to perform leg and calf press with no increase in symptoms with performance  Plan: Continue per plan of care

## 2018-07-27 ENCOUNTER — OFFICE VISIT (OUTPATIENT)
Dept: PHYSICAL THERAPY | Facility: CLINIC | Age: 63
End: 2018-07-27
Payer: MEDICARE

## 2018-07-27 ENCOUNTER — APPOINTMENT (OUTPATIENT)
Dept: PHYSICAL THERAPY | Facility: CLINIC | Age: 63
End: 2018-07-27
Payer: MEDICARE

## 2018-07-27 DIAGNOSIS — M79.7 FIBROMYALGIA: ICD-10-CM

## 2018-07-27 DIAGNOSIS — M17.0 PRIMARY OSTEOARTHRITIS OF BOTH KNEES: Primary | ICD-10-CM

## 2018-07-27 PROCEDURE — 97140 MANUAL THERAPY 1/> REGIONS: CPT | Performed by: PHYSICAL THERAPIST

## 2018-07-27 PROCEDURE — 97112 NEUROMUSCULAR REEDUCATION: CPT | Performed by: PHYSICAL THERAPIST

## 2018-07-27 PROCEDURE — 97150 GROUP THERAPEUTIC PROCEDURES: CPT | Performed by: PHYSICAL THERAPIST

## 2018-07-27 NOTE — PROGRESS NOTES
Daily Note     Today's date: 2018  Patient name: Mirella Sy  : 1955  MRN: [id number]  Referring provider: Placido Flowers MD  Dx:   Encounter Diagnosis     ICD-10-CM    1  Primary osteoarthritis of both knees M17 0    2  Fibromyalgia M79 7                   Subjective:  Patient reports she went to the [facility] yesterday with her family and reports she was able to use the knee brace all day and tolerated walking for the day  Patient reports she was very happy with her ability to walk longer  Patient reports 5/10 at the start of the session and 4/10 pain levels at the end of the session in the bilateral knees           Objective: See treatment diary below    Precautions B/L Knee OA; Fibromyalgia    Manual         STM and TPR prn Bilat knee and LE stretching  With manual  LE traction  15 min                                                                     Exercise Diary     NuStep L5 10 min L5 10 min 10 min L5 10' L5 10 min L5    Leg and calf press      20#  2x10    Monster Walks  6x10 Feet Green  6x10 Feet   Green  6x10 Feet  Green  6x10'   Green 6x10 Feet   Green    Side Step and Squat  6x10 Feet Green  6x10 Feet Green 6x10  Feet   Green  6x10'  Green 6x10 Feet   Green    Fwd/Lat Step Ups          Step Up and Over 15x Bilat   6" step  2x10 Bilat   4" step  13X Bilat   6" step  2x13  6"step 2x10 Bilat   6" step             Single Leg Stance     DD seated trunk rotation and D1/D2 Flex  2x10  Red Ball  DD seated trunk rotation  And D1/D2  Flex 2x12  Red ball DD seated Trunk rotation and D1/D2 Flex   2x10   Red Ball    Tandem Walk on Foam 10x10 Feet     6x10'  6x10 Feet    Biodex LOS  Side step on foam 6x10 Feet           Biodex LOS            Biodex Random            Biodex Random            Biodex Maze            Biodex Maze            Gait Training            Stair HR/TR             Stair gastroc stretch  5x15" Hold Bilat  5x15" Hold Bilat   5x15" Hold  5x15" hold  bilat 5x15" Hold Bilat    LTR with Tball  10x5"hold  Bilat 10x5" hold Bilat   10x5" hold  10x5"hold 10x5" hold    DKTC with Tball  10x5"hold 10x5" Hold    10x5"hold 10x5" hold Bilat          Modalities 7/27        MHP to Low Back and B/L Knees in seated 15 min                                                 Assessment: Tolerated treatment well  PT notes continuation of progression of TE program with focus on gait/balance and manual therapy to decrease pain levels and improve functional limitations to meet therapy goals  PT notes addition of side step on foam to address balance deficits to meet therapy goals  Plan: Continue per plan of care

## 2018-07-30 ENCOUNTER — OFFICE VISIT (OUTPATIENT)
Dept: PHYSICAL THERAPY | Facility: CLINIC | Age: 63
End: 2018-07-30
Payer: MEDICARE

## 2018-07-30 DIAGNOSIS — M17.0 PRIMARY OSTEOARTHRITIS OF BOTH KNEES: Primary | ICD-10-CM

## 2018-07-30 DIAGNOSIS — M79.7 FIBROMYALGIA: ICD-10-CM

## 2018-07-30 PROCEDURE — 97150 GROUP THERAPEUTIC PROCEDURES: CPT | Performed by: PHYSICAL THERAPIST

## 2018-07-30 PROCEDURE — 97140 MANUAL THERAPY 1/> REGIONS: CPT | Performed by: PHYSICAL THERAPIST

## 2018-07-30 NOTE — PROGRESS NOTES
Daily Note     Today's date: 2018  Patient name: Valarie Rose  : 1955  MRN: [id number]  Referring provider: Leda Carolina MD  Dx:   Encounter Diagnosis     ICD-10-CM    1  Primary osteoarthritis of both knees M17 0    2  Fibromyalgia M79 7                   Subjective:  Patient reports having a bad weekend with increase pain and stiffness in the knees  Patient expresses frustration with continuation of flare-ups with increase pain levels in the knees and legs  Patient reports continuation of difficulty with increase pain levels in the knees  Patient reports 7/10 pain levels at the start of the session and 4/10 pain levels in the bilateral knees with statements of feeling looser post session         Objective: See treatment diary below    Precautions B/L Knee OA; Fibromyalgia    Manual         STM and TPR prn Bilat knee and LE stretching  With manual  LE traction  15 min 15 min                                                                     Exercise Diary     NuStep L5 10 min L5 10 min 10 min L5 10' L5 10 min L5    Leg and calf press      20#  2x10    Monster Walks  6x10 Feet Green   6x10 Feet  Green  6x10'   Green 6x10 Feet   Green    Side Step and Squat  6x10 Feet Green   6x10  Feet   Green  6x10'  Green 6x10 Feet   Green    Fwd/Lat Step Ups          Step Up and Over 15x Bilat   6" step    13X Bilat   6" step  2x13  6"step 2x10 Bilat   6" step             Single Leg Stance     DD seated trunk rotation and D1/D2 Flex  2x10  Red Ball  DD seated trunk rotation  And D1/D2  Flex 2x12  Red ball DD seated Trunk rotation and D1/D2 Flex   2x10   Red Ball    Tandem Walk on Foam 10x10 Feet     6x10'  6x10 Feet    Biodex LOS  Side step on foam 6x10 Feet           Biodex LOS            Biodex Random            Biodex Random            Biodex Maze            Biodex Maze            Gait Training            Stair HR/TR             Stair gastroc stretch  5x15" Hold Bilat  5x15" Hold Bilat   5x15" Hold  5x15" hold  bilat 5x15" Hold Bilat    LTR with Tball  10x5"hold  Bilat 10x5" hold Bilat   10x5" hold  10x5"hold 10x5" hold    DKTC with Tball  10x5"hold 10x5" Hold    10x5"hold 10x5" hold Bilat          Modalities 7/27 7/30      MHP to Low Back and B/L Knees in seated 15 min 15 min                                           Assessment: Tolerated treatment fair  PT notes modified treatment secondary to subjective comments but PT will continue to progress toward therapy goals and full TE session as tolerated by patient  Plan: Continue per plan of care

## 2018-08-01 ENCOUNTER — OFFICE VISIT (OUTPATIENT)
Dept: PHYSICAL THERAPY | Facility: CLINIC | Age: 63
End: 2018-08-01
Payer: MEDICARE

## 2018-08-01 DIAGNOSIS — M79.7 FIBROMYALGIA: Primary | ICD-10-CM

## 2018-08-01 DIAGNOSIS — M17.0 PRIMARY OSTEOARTHRITIS OF BOTH KNEES: ICD-10-CM

## 2018-08-01 PROCEDURE — 97140 MANUAL THERAPY 1/> REGIONS: CPT | Performed by: PHYSICAL THERAPIST

## 2018-08-01 PROCEDURE — 97150 GROUP THERAPEUTIC PROCEDURES: CPT | Performed by: PHYSICAL THERAPIST

## 2018-08-01 NOTE — PROGRESS NOTES
Daily Note     Today's date: 2018  Patient name: Gale Sims  : 1955  MRN: [id number]  Referring provider: Dany Jaime MD  Dx:   Encounter Diagnosis     ICD-10-CM    1  Fibromyalgia M79 7    2  Primary osteoarthritis of both knees M17 0                   Subjective:  Patient reports having a horrible day with the rainy weather  Patient reports pain levels are up in the entire body especially the feet and knees  Patient reports she has to use the cane with walking because of the increase pain levels  Patient expresses frustration with continuation of flare-ups in the body  Patient reports 8/10 pain levels in the knees and feet at the start of session and 9/10 pain levels at the end of the session         Objective: See treatment diary below    Precautions B/L Knee OA; Fibromyalgia    Manual       STM and TPR prn Bilat knee and LE stretching  With manual  LE traction  15 min 15 min  30 min                                                                    Exercise Diary     NuStep L5 10 min L5 10 min 10 min L5 10' L5 10 min L5    Leg and calf press      20#  2x10    Monster Walks  6x10 Feet Green    6x10'   Green 6x10 Feet   Green    Side Step and Squat  6x10 Feet Green    6x10'  Green 6x10 Feet   Green    Fwd/Lat Step Ups         Step Up and Over 15x Bilat   6" step    2x13  6"step 2x10 Bilat   6" step             Single Leg Stance     DD seated trunk rotation  And D1/D2  Flex 2x12  Red ball DD seated Trunk rotation and D1/D2 Flex   2x10   Red Ball    Tandem Walk on Foam 10x10 Feet     6x10'  6x10 Feet    Biodex LOS  Side step on foam 6x10 Feet           Biodex LOS            Biodex Random            Biodex Random            Biodex Maze            Biodex Maze            Gait Training            Stair HR/TR             Stair gastroc stretch  5x15" Hold Bilat  5x15" Hold Bilat    5x15" hold  bilat 5x15" Hold Bilat    LTR with Tball  10x5"hold  Bilat 10x5" hold Bilat   2x10 Bilat 5" hold  10x5"hold 10x5" hold    DKTC with Tball  10x5"hold 10x5" Hold   2x10 Orange Tball  10x5"hold 10x5" hold Bilat          Modalities 7/27 7/30 8/1     MHP to Low Back and B/L Knees in seated 15 min 15 min  15 min                                            Assessment: Tolerated treatment poor  PT notes modified treatment secondary to subjective comments but PT will continue to progress toward therapy goals and full TE session as tolerated by patient  Plan: Continue per plan of care

## 2018-08-03 ENCOUNTER — APPOINTMENT (OUTPATIENT)
Dept: PHYSICAL THERAPY | Facility: CLINIC | Age: 63
End: 2018-08-03
Payer: MEDICARE

## 2018-08-06 ENCOUNTER — OFFICE VISIT (OUTPATIENT)
Dept: PHYSICAL THERAPY | Facility: CLINIC | Age: 63
End: 2018-08-06
Payer: MEDICARE

## 2018-08-06 DIAGNOSIS — M79.7 FIBROMYALGIA: Primary | ICD-10-CM

## 2018-08-06 DIAGNOSIS — M17.0 PRIMARY OSTEOARTHRITIS OF BOTH KNEES: ICD-10-CM

## 2018-08-06 PROCEDURE — 97140 MANUAL THERAPY 1/> REGIONS: CPT | Performed by: PHYSICAL THERAPIST

## 2018-08-06 PROCEDURE — 97150 GROUP THERAPEUTIC PROCEDURES: CPT | Performed by: PHYSICAL THERAPIST

## 2018-08-06 PROCEDURE — G8978 MOBILITY CURRENT STATUS: HCPCS | Performed by: PHYSICAL THERAPIST

## 2018-08-06 PROCEDURE — G8979 MOBILITY GOAL STATUS: HCPCS | Performed by: PHYSICAL THERAPIST

## 2018-08-06 NOTE — PROGRESS NOTES
PT Re-Evaluation     Today's date: 2018  Patient name: Bar Luna  : 1955  MRN: [id number]  Referring provider: Madina Vega MD  Dx:   Encounter Diagnosis     ICD-10-CM    1  Fibromyalgia M79 7    2  Primary osteoarthritis of both knees M17 0                   Assessment  Impairments: abnormal gait, activity intolerance, impaired balance, impaired physical strength, lacks appropriate home exercise program, pain with function and poor posture     Assessment details: Pt presents with chief complaint of B/L knee OA and chronic history of fibromyalgia  PT notes the pt the patient with improved LE strength but continuation of demonstration of antalgic gait pattern and balance with increase pain levels and functional limitations with need for continuation of skilled therapy for 4 weeks with focus on gait/balance, strengthening, manual therapy, posture, analgesic modalities, and update/review of HEP with progression to HEP/wellness program   PT notes the patient's PMH may prolong course of skilled therapy and/or impair prognosis  Understanding of Dx/Px/POC: good   Prognosis: good    Goals  STG  1  Pt will decrease pain by 25-50%-Partial MET  2  Pt will demonstrate improved gait mechanics-MET    3  Initiate HEP-MET    LTG  1  Pt will improve BLE mm strength by 1-2 mm grades-MET   2  Pt will demonstrate improved standing balance-MET   3   Pt will ambulate safely without AD use for community distances-Partial MET   4   DC with HEP-Progressing       Plan  Patient would benefit from: PT eval and skilled physical therapy  Planned modality interventions: unattended electrical stimulation and thermotherapy: hydrocollator packs  Planned therapy interventions: abdominal trunk stabilization, balance, flexibility, gait training, graded exercise, home exercise program, manual therapy, massage, neuromuscular re-education, patient education, postural training, strengthening, stretching and therapeutic exercise  Frequency: 3x week  Duration in visits: 8  Duration in weeks: 4  Plan of Care beginning date: 2018  Plan of Care expiration date: 9/3/2018  Treatment plan discussed with: patient  Plan details: PT notes review of POC and findings with patient who is in agreement with PT recommendations of continuation of skilled therapy  Subjective Evaluation    History of Present Illness  Mechanism of injury: Pt presents with chief complaint of B/L knee OA and chronic hx of fibromyalgia  Pt was seen by MD on  and was prescribed a course of skilled PT to treat current symptoms  Pt reports she is receivng Euflexxa injections but feels that they are no longer being effective  Pt is scheduled to receive last Euflexxa injection tomorrow  Pt has been using SPC for approximately 6 months, starting with occasional use for long distances and now is typically all the time, primarily for balance and assistance with gait  Pt notes majority of symptoms in R knee, with tightness in BLE mm, especially in the posterior chain  Pt also notes TrPs along medial aspect of B/L knee and a feeling of fluid accumulation in B/L knees  Presently the patient has attended 18 sessions of skilled therapy and feels 60% improvement with overall increase strength in the legs and some decrease in intensity and duration of symptoms in the low back and LE  Patient reports she is happy with current progress but feels she can get stronger in the legs and wants to work on making walking, stair climbing and ADL easier     Recurrent probem    Quality of life: good    Pain  Current pain ratin  At best pain ratin  At worst pain rating: 10  Location: Bilateral lower extremity  Quality: tight, dull ache and sharp  Relieving factors: rest, heat and medications  Aggravating factors: walking and stair climbing (overexertion, weather)  Progression: improved    Social Support    Employment status: not working    Diagnostic Tests  X-ray: abnormal  Treatments  Previous treatment: injection treatment, physical therapy and massage (massage has helped, chiropractor has not)  Patient Goals  Patient goals for therapy: decreased pain, improved balance, increased motion and increased strength  Patient goal: to walk without cane with minimal pain        Objective     Postural Observations  Seated posture: fair  Standing posture: fair        Palpation   Left   Tenderness of the erector spinae, distal biceps femoris, distal semimembranosus, distal semitendinosus, gluteus christiano, gluteus medius and lumbar paraspinals  Right Tenderness of the erector spinae, distal biceps femoris, distal semimembranosus, distal semitendinosus, gluteus christiano, gluteus medius, lateral gastrocnemius, lumbar paraspinals, proximal biceps femoris, proximal semimembranosus and proximal semitendinosus  Additional Palpation Details  PT notes the patient with + pain over the distal HS and anterior tibialis area of the bilateral LE       Tenderness     Left Hip   Tenderness in the PSIS and greater trochanter  Right Hip   Tenderness in the PSIS, greater trochanter and ischial tuberosity  Right Knee   Tenderness in the lateral joint line       Neurological Testing     Reflexes   Left   Patellar (L4): normal (2+)  Achilles (S1): normal (2+)    Right   Patellar (L4): normal (2+)  Achilles (S1): normal (2+)    Active Range of Motion   Left Hip   Flexion: 67 degrees     Right Hip   Flexion: 64 degrees     Additional Active Range of Motion Details  PT notes the pt with WNL AROM of the L/S and BLE    Strength/Myotome Testing     Left Hip   Planes of Motion   Flexion: 4+  Extension: 5  Abduction: 5  Adduction: 5    Right Hip   Planes of Motion   Flexion: 4+  Extension: 5  Abduction: 5  Adduction: 5    Left Knee   Flexion: 5  Extension: 4+    Right Knee   Flexion: 5  Extension: 4+    Left Ankle/Foot   Dorsiflexion: 5  Plantar flexion: 5    Right Ankle/Foot   Dorsiflexion: 5  Plantar flexion: 5    Additional Strength Details  Pain with b/l resisted knee ext and R hip flexion (mm tightness)    Tests     Additional Tests Details  PT notes the pt with WNL ligament stability B/L, (-) meniscal testing, and equal mm activation    Ambulation   Weight-Bearing Status   Assistive device used: single point cane and none    Additional Weight-Bearing Status Details  Patient depending on pain levels may require a SPC with ambulation if pain levels are higher    Ambulation: Level Surfaces   Ambulation with assistive device: independent    Ambulation: Stairs   Pattern: non-reciprocal  Railings: one rail  Pattern: non-reciprocal  Railings: one rail    Observational Gait   Gait: antalgic   Decreased right stance time and right step length  Quality of Movement During Gait   Trunk  Forward lean  Pelvis    Pelvis (Left): Negative Trendelenburg  Pelvis (Right): Negative Trendelenburg             Precautions B/L Knee OA; Fibromyalgia

## 2018-08-06 NOTE — PROGRESS NOTES
Daily Note     Today's date: 2018  Patient name: Wilmar Hampton  : 1955  MRN: [id number]  Referring provider: Judit Pittman MD  Dx:   Encounter Diagnosis     ICD-10-CM    1  Fibromyalgia M79 7    2  Primary osteoarthritis of both knees M17 0                   Subjective:  Patient reports the drier weather is helping bring the pain down in the legs  See RE for details  Objective: See treatment diary below    Precautions B/L Knee OA; Fibromyalgia    Manual      STM and TPR prn Bilat knee and LE stretching  With manual  LE traction  15 min 15 min  30 min  15 min                                                                   Exercise Diary     NuStep L5 10 min L5 10 min 10 min L5 10' L5 10 min L5    Leg and calf press      20#  2x10    Monster Walks  6x10 Feet Green     6x10 Feet   Green    Side Step and Squat  6x10 Feet Green     6x10 Feet   Green    Fwd/Lat Step Ups         Step Up and Over 15x Bilat   6" step    10x Bilat   4" step 2x10 Bilat   6" step    Front and lateral step up      10x Bilat   4" step     Single Leg Stance      DD seated Trunk rotation and D1/D2 Flex   2x10   Red Ball    Tandem Walk on Foam 10x10 Feet       6x10 Feet    Biodex LOS  Side step on foam 6x10 Feet          Biodex LOS            Biodex Random            Biodex Random            Biodex Maze            Biodex Maze            Gait Training            Stair HR/TR             Stair gastroc stretch  5x15" Hold Bilat  5x15" Hold Bilat    5x15" hold  bilat 5x15" Hold Bilat    LTR with Tball  10x5"hold  Bilat 10x5" hold Bilat   2x10 Bilat 5" hold  10x5"hold 10x5" hold    DKTC with Tball  10x5"hold 10x5" Hold   2x10 Orange Tball  10x5"hold 10x5" hold Bilat          Modalities     MHP to Low Back and B/L Knees in seated 15 min 15 min  15 min  15 min                                           Assessment: Tolerated treatment fair    PT notes modified treatment secondary to continuation of increase pain levels in the bilateral knees but PT will continue to progress toward therapy goals and full TE session as tolerated by patient  See RE for details  Plan: Continue per plan of care

## 2018-08-06 NOTE — LETTER
2018    MD [address] [place] [address]    Patient: Donald Kaur   YOB: 1955   Date of Visit: 2018     Encounter Diagnosis     ICD-10-CM    1  Fibromyalgia M79 7    2  Primary osteoarthritis of both knees M17 0        Dear Dr Mallika Junior:    Please review the attached Plan of Care from [facility] recent visit  Please verify that you agree therapy should continue by signing the attached document and sending it back to our office  If you have any questions or concerns, please don't hesitate to call  Sincerely,    Naomi Sorto PT      Referring Provider:      I certify that I have read the below Plan of Care and certify the need for these services furnished under this plan of treatment while under my care  Royer Fung MD  [facility]: [phone]          Daily Note     Today's date: 2018  Patient name: Donald Kaur  : 1955  MRN: [id number]  Referring provider: Renetta Jaffe MD  Dx:   Encounter Diagnosis     ICD-10-CM    1  Fibromyalgia M79 7    2  Primary osteoarthritis of both knees M17 0                   Subjective:  Patient reports the drier weather is helping bring the pain down in the legs  See RE for details          Objective: See treatment diary below    Precautions B/L Knee OA; Fibromyalgia    Manual      STM and TPR prn Bilat knee and LE stretching  With manual  LE traction  15 min 15 min  30 min  15 min                                                                   Exercise Diary     NuStep L5 10 min L5 10 min 10 min L5 10' L5 10 min L5    Leg and calf press      20#  2x10    Monster Walks  6x10 Feet Green     6x10 Feet   Green    Side Step and Squat  6x10 Feet Green     6x10 Feet   Green    Fwd/Lat Step Ups         Step Up and Over 15x Bilat   6" step    10x Bilat   4" step 2x10 Bilat   6" step Front and lateral step up      10x Bilat   4" step     Single Leg Stance      DD seated Trunk rotation and D1/D2 Flex   2x10   Red Ball    Tandem Walk on Foam 10x10 Feet       6x10 Feet    Biodex LOS  Side step on foam 6x10 Feet          Biodex LOS            Biodex Random            Biodex Random            Biodex Maze            Biodex Maze            Gait Training            Stair HR/TR             Stair gastroc stretch  5x15" Hold Bilat  5x15" Hold Bilat    5x15" hold  bilat 5x15" Hold Bilat    LTR with Tball  10x5"hold  Bilat 10x5" hold Bilat   2x10 Bilat 5" hold  10x5"hold 10x5" hold    DKTC with Tball  10x5"hold 10x5" Hold   2x10 Orange Tball  10x5"hold 10x5" hold Bilat          Modalities      MHP to Low Back and B/L Knees in seated 15 min 15 min  15 min                                            Assessment: Tolerated treatment fair  PT notes modified treatment secondary to continuation of increase pain levels in the bilateral knees but PT will continue to progress toward therapy goals and full TE session as tolerated by patient  See RE for details  Plan: Continue per plan of care  PT Re-Evaluation     Today's date: 2018  Patient name: Dee Shaffer  : 1955  MRN: [id number]  Referring provider: Logan Sharma MD  Dx:   Encounter Diagnosis     ICD-10-CM    1  Fibromyalgia M79 7    2  Primary osteoarthritis of both knees M17 0                   Assessment  Impairments: abnormal gait, activity intolerance, impaired balance, impaired physical strength, lacks appropriate home exercise program, pain with function and poor posture     Assessment details: Pt presents with chief complaint of B/L knee OA and chronic history of fibromyalgia   PT notes the pt the patient with improved LE strength but continuation of demonstration of antalgic gait pattern and balance with increase pain levels and functional limitations with need for continuation of skilled therapy for 4 weeks with focus on gait/balance, strengthening, manual therapy, posture, analgesic modalities, and update/review of HEP with progression to HEP/wellness program   PT notes the patient's PMH may prolong course of skilled therapy and/or impair prognosis  Understanding of Dx/Px/POC: good   Prognosis: good    Goals  STG  1  Pt will decrease pain by 25-50%-Partial MET  2  Pt will demonstrate improved gait mechanics-MET    3  Initiate HEP-MET    LTG  1  Pt will improve BLE mm strength by 1-2 mm grades-MET   2  Pt will demonstrate improved standing balance-MET   3  Pt will ambulate safely without AD use for community distances-Partial MET   4   DC with HEP-Progressing       Plan  Patient would benefit from: PT eval and skilled physical therapy  Planned modality interventions: unattended electrical stimulation and thermotherapy: hydrocollator packs  Planned therapy interventions: abdominal trunk stabilization, balance, flexibility, gait training, graded exercise, home exercise program, manual therapy, massage, neuromuscular re-education, patient education, postural training, strengthening, stretching and therapeutic exercise  Frequency: 3x week  Duration in visits: 8  Duration in weeks: 4  Plan of Care beginning date: 8/6/2018  Plan of Care expiration date: 9/3/2018  Treatment plan discussed with: patient  Plan details: PT notes review of POC and findings with patient who is in agreement with PT recommendations of continuation of skilled therapy  Subjective Evaluation    History of Present Illness  Mechanism of injury: Pt presents with chief complaint of B/L knee OA and chronic hx of fibromyalgia  Pt was seen by MD on 6/4 and was prescribed a course of skilled PT to treat current symptoms  Pt reports she is receivng Euflexxa injections but feels that they are no longer being effective  Pt is scheduled to receive last Euflexxa injection tomorrow   Pt has been using SPC for approximately 6 months, starting with occasional use for long distances and now is typically all the time, primarily for balance and assistance with gait  Pt notes majority of symptoms in R knee, with tightness in BLE mm, especially in the posterior chain  Pt also notes TrPs along medial aspect of B/L knee and a feeling of fluid accumulation in B/L knees  Presently the patient has attended 18 sessions of skilled therapy and feels 60% improvement with overall increase strength in the legs and some decrease in intensity and duration of symptoms in the low back and LE  Patient reports she is happy with current progress but feels she can get stronger in the legs and wants to work on making walking, stair climbing and ADL easier  Recurrent probem    Quality of life: good    Pain  Current pain ratin  At best pain ratin  At worst pain rating: 10  Location: Bilateral lower extremity  Quality: tight, dull ache and sharp  Relieving factors: rest, heat and medications  Aggravating factors: walking and stair climbing (overexertion, weather)  Progression: improved    Social Support    Employment status: not working    Diagnostic Tests  X-ray: abnormal  Treatments  Previous treatment: injection treatment, physical therapy and massage (massage has helped, chiropractor has not)  Patient Goals  Patient goals for therapy: decreased pain, improved balance, increased motion and increased strength  Patient goal: to walk without cane with minimal pain        Objective     Postural Observations  Seated posture: fair  Standing posture: fair        Palpation   Left   Tenderness of the erector spinae, distal biceps femoris, distal semimembranosus, distal semitendinosus, gluteus christiano, gluteus medius and lumbar paraspinals       Right Tenderness of the erector spinae, distal biceps femoris, distal semimembranosus, distal semitendinosus, gluteus christiano, gluteus medius, lateral gastrocnemius, lumbar paraspinals, proximal biceps femoris, proximal semimembranosus and proximal semitendinosus  Additional Palpation Details  PT notes the patient with + pain over the distal HS and anterior tibialis area of the bilateral LE       Tenderness     Left Hip   Tenderness in the PSIS and greater trochanter  Right Hip   Tenderness in the PSIS, greater trochanter and ischial tuberosity  Right Knee   Tenderness in the lateral joint line  Neurological Testing     Reflexes   Left   Patellar (L4): normal (2+)  Achilles (S1): normal (2+)    Right   Patellar (L4): normal (2+)  Achilles (S1): normal (2+)    Active Range of Motion   Left Hip   Flexion: 67 degrees     Right Hip   Flexion: 64 degrees     Additional Active Range of Motion Details  PT notes the pt with WNL AROM of the L/S and BLE    Strength/Myotome Testing     Left Hip   Planes of Motion   Flexion: 4+  Extension: 5  Abduction: 5  Adduction: 5    Right Hip   Planes of Motion   Flexion: 4+  Extension: 5  Abduction: 5  Adduction: 5    Left Knee   Flexion: 5  Extension: 4+    Right Knee   Flexion: 5  Extension: 4+    Left Ankle/Foot   Dorsiflexion: 5  Plantar flexion: 5    Right Ankle/Foot   Dorsiflexion: 5  Plantar flexion: 5    Additional Strength Details  Pain with b/l resisted knee ext and R hip flexion (mm tightness)    Tests     Additional Tests Details  PT notes the pt with WNL ligament stability B/L, (-) meniscal testing, and equal mm activation    Ambulation   Weight-Bearing Status   Assistive device used: single point cane and none    Additional Weight-Bearing Status Details  Patient depending on pain levels may require a SPC with ambulation if pain levels are higher    Ambulation: Level Surfaces   Ambulation with assistive device: independent    Ambulation: Stairs   Pattern: non-reciprocal  Railings: one rail  Pattern: non-reciprocal  Railings: one rail    Observational Gait   Gait: antalgic   Decreased right stance time and right step length  Quality of Movement During Gait   Trunk  Forward lean       Pelvis    Pelvis (Left): Negative Trendelenburg  Pelvis (Right): Negative Trendelenburg             Precautions B/L Knee OA; Fibromyalgia

## 2018-08-08 ENCOUNTER — OFFICE VISIT (OUTPATIENT)
Dept: PHYSICAL THERAPY | Facility: CLINIC | Age: 63
End: 2018-08-08
Payer: MEDICARE

## 2018-08-08 DIAGNOSIS — M17.0 PRIMARY OSTEOARTHRITIS OF BOTH KNEES: ICD-10-CM

## 2018-08-08 DIAGNOSIS — M79.7 FIBROMYALGIA: Primary | ICD-10-CM

## 2018-08-08 PROCEDURE — 97110 THERAPEUTIC EXERCISES: CPT

## 2018-08-08 PROCEDURE — 97140 MANUAL THERAPY 1/> REGIONS: CPT

## 2018-08-08 NOTE — PROGRESS NOTES
Daily Note     Today's date: 2018  Patient name: Wilmar Hampton  : 1955  MRN: [id number]  Referring provider: Judit Pittman MD  Dx:   Encounter Diagnosis     ICD-10-CM    1  Fibromyalgia M79 7    2  Primary osteoarthritis of both knees M17 0        Start Time: 805          Subjective: " I feel like crap  I think we over did it last time  Im back to using the cane "                         Pain in 7/10                         Pain out 3/10      Objective: See treatment diary below    Precautions B/L Knee OA; Fibromyalgia    Manual     STM and TPR prn Bilat knee and LE stretching  With manual  LE traction  15 min 15 min  30 min  15 min  20 min                                                                 Exercise Diary     NuStep L5 10 min L5 10 min 10 min L5 10' L5 10 min L5    Leg and calf press         Monster Walks  6x10 Feet Green     6x 10 feet  No resist   Side Step and Squat  6x10 Feet Green     4x10 feet  No resist   Fwd/Lat Step Ups         Step Up and Over 15x Bilat   6" step    10x Bilat   4" step    Front and lateral step up      10x Bilat   4" step     Single Leg Stance         Tandem Walk on Foam 10x10 Feet         Biodex LOS  Side step on foam 6x10 Feet          Biodex LOS            Biodex Random            Biodex Random            Biodex Maze            Biodex Maze            Gait Training            Stair HR/TR             Stair gastroc stretch  5x15" Hold Bilat  5x15" Hold Bilat    5x15" hold  bilat 5x15" Hold Bilat    LTR with Tball  10x5"hold  Bilat 10x5" hold Bilat   2x10 Bilat 5" hold  10x5"hold 10x5" hold    DKTC with Tball  10x5"hold 10x5" Hold   2x10 Orange Tball  10x5"hold 10x5" hold Bilat          Modalities      MHP to Low Back and B/L Knees in seated 15 min 15 min  15 min  15 min  15 min                                   Assessment: Modified treatment due to aforementioned subjective comment   Will continue to monitor pain levels and level of endurance and adjust program as needed in upcoming visits  Patient would benefit from continued therapy to decrease pain and increase strength and flexibility to improve LOF      Plan: Continue per plan of care  Progress treatment as tolerated

## 2018-08-10 ENCOUNTER — OFFICE VISIT (OUTPATIENT)
Dept: PHYSICAL THERAPY | Facility: CLINIC | Age: 63
End: 2018-08-10
Payer: MEDICARE

## 2018-08-10 DIAGNOSIS — M79.7 FIBROMYALGIA: Primary | ICD-10-CM

## 2018-08-10 DIAGNOSIS — M17.0 PRIMARY OSTEOARTHRITIS OF BOTH KNEES: ICD-10-CM

## 2018-08-10 PROCEDURE — 97140 MANUAL THERAPY 1/> REGIONS: CPT | Performed by: PHYSICAL THERAPIST

## 2018-08-10 PROCEDURE — 97150 GROUP THERAPEUTIC PROCEDURES: CPT | Performed by: PHYSICAL THERAPIST

## 2018-08-10 NOTE — PROGRESS NOTES
Daily Note     Today's date: 8/10/2018  Patient name: Aleena Harkins  : 1955  MRN: [id number]  Referring provider: Jessica Nguyen MD  Dx:   Encounter Diagnosis     ICD-10-CM    1  Fibromyalgia M79 7    2  Primary osteoarthritis of both knees M17 0                   Subjective:  Patient reports she has an issue with her right knee with a bump/swelling behind the knee  Patient reports taking a step backwards this week with increase pain in the knees and difficulty with getting up from the seated position and need for [facility] for walking secondary to increase pain levels  Patient reports frustration with increase pain levels in the knees  Patient reports she is going to contact the surgeon today to talk to him about further evaluation of the knee  Patient reports 5/10 pain levels in the knees t/o the session          Objective: See treatment diary below    Precautions B/L Knee OA; Fibromyalgia    Manual  8/10  7/30  8/1 8/6 8/8   STM and TPR prn Bilat knee and LE stretching  With manual  LE traction  20 min 15 min  30 min  15 min  20 min                                                                 Exercise Diary  8/10  7/30 8/1 8/6 8/8   NuStep L5 10 min L5 10 min 10 min L5 10' L5 10 min L5    Leg and calf press         Monster Walks NT     6x 10 feet  No resist   Side Step and Squat NT     4x10 feet  No resist   Fwd/Lat Step Ups         Step Up and Over NT    10x Bilat   4" step    Front and lateral step up      10x Bilat   4" step     Single Leg Stance         Tandem Walk on Foam NT         Side step on foam   NT          Biodex LOS            Biodex Random            Biodex Random            Biodex Maze            Biodex Maze            Gait Training            Stair HR/TR             Stair gastroc stretch  5x15" Hold Bilat  5x15" Hold Bilat    5x15" hold  bilat 5x15" Hold Bilat    LTR with Tball  10x5"hold  Bilat 10x5" hold Bilat   2x10 Bilat 5" hold  10x5"hold 10x5" hold    DKTC with Tball  10x5"hold 10x5" Hold   2x10 Orange Tball  10x5"hold 10x5" hold Bilat          Modalities 8/10  7/30 8/1 8/6 8/8     MHP to Low Back and B/L Knees in seated 15 min  Bilat HS and knees  15 min  15 min  15 min  15 min                                       Assessment: Tolerated treatment fair  PT notes modified treatment secondary to subjective comments but PT will continue to progress toward therapy goals and full TE session as tolerated by patient  PT notes recommendation to patient with agreement to contact MD for further evaluation of the right knee  Plan: Continue per plan of care

## 2018-08-14 ENCOUNTER — TRANSCRIBE ORDERS (OUTPATIENT)
Dept: PHYSICAL THERAPY | Facility: CLINIC | Age: 63
End: 2018-08-14

## 2018-08-14 DIAGNOSIS — M79.7 FIBROMYALGIA: Primary | ICD-10-CM

## 2018-08-14 DIAGNOSIS — M17.0 PRIMARY OSTEOARTHRITIS OF BOTH KNEES: ICD-10-CM

## 2018-08-15 ENCOUNTER — OFFICE VISIT (OUTPATIENT)
Dept: PHYSICAL THERAPY | Facility: CLINIC | Age: 63
End: 2018-08-15
Payer: MEDICARE

## 2018-08-15 DIAGNOSIS — M79.7 FIBROMYALGIA: ICD-10-CM

## 2018-08-15 DIAGNOSIS — M17.0 PRIMARY OSTEOARTHRITIS OF BOTH KNEES: Primary | ICD-10-CM

## 2018-08-15 PROCEDURE — 97150 GROUP THERAPEUTIC PROCEDURES: CPT | Performed by: PHYSICAL THERAPIST

## 2018-08-15 PROCEDURE — 97140 MANUAL THERAPY 1/> REGIONS: CPT | Performed by: PHYSICAL THERAPIST

## 2018-08-15 NOTE — PROGRESS NOTES
Daily Note     Today's date: 8/15/2018  Patient name: Corby Cisneros  : 1955  MRN: [id number]  Referring provider: Florin Feliciano MD  Dx:   Encounter Diagnosis     ICD-10-CM    1  Primary osteoarthritis of both knees M17 0    2  Fibromyalgia M79 7                   Subjective:  Patient reports she did f/u with MD and received a cortisone injection in the right knee  Patient also states she had discussion with MD about the continuation of limitations with her knees with increase pain levels  Patient reports a decision was made to have surgery on her right knee  Patient is tentatively scheduled for arthroscopic repair of the the right knee on 10/24  Patient reports relief of symptoms post injection with 2/10 pain levels at the start of the session and 1/10 pain levels post session  Patient reports she has a family emergency and will not be able to attend the entire session         Objective: See treatment diary below    Precautions B/L Knee OA; Fibromyalgia    Manual  8/10  8/15 8/1 8/6 8/8   STM and TPR prn Bilat knee and LE stretching  With manual  LE traction  20 min 15 min  30 min  15 min  20 min                                                                 Exercise Diary  8/10  8/15 8/1 8/6 8/8   NuStep L5 10 min L5 10 min 10 min L5 10' L5 10 min L5    Leg and calf press         Monster Walks NT     6x 10 feet  No resist   Side Step and Squat NT     4x10 feet  No resist   Fwd/Lat Step Ups         Step Up and Over NT    10x Bilat   4" step    Front and lateral step up      10x Bilat   4" step     Single Leg Stance         Tandem Walk on Foam NT         Side step on foam   NT          Biodex LOS            Biodex Random            Biodex Random            Biodex Maze            Biodex Maze            Gait Training            Stair HR/TR             Stair gastroc stretch  5x15" Hold Bilat     5x15" hold  bilat 5x15" Hold Bilat    LTR with Tball  10x5"hold  Bilat   2x10 Bilat 5" hold  10x5"hold 10x5" hold    DKTC with Tball  10x5"hold   2x10 Orange Tball  10x5"hold 10x5" hold Bilat          Modalities 8/10  8/15 8/1 8/6 8/8     MHP to Low Back and B/L Knees in seated 15 min  Bilat HS and knees  15 min  15 min  15 min  15 min                                         Assessment: Tolerated treatment fair  PT notes modified treatment secondary to family emergency but PT will continue to progress toward therapy goals and full TE session as tolerated by patient  PT notes discussion with patient about POC after decision to undergo arthroscopic repair of the right knee and PT will continue for 1-2 weeks to update/review HEP and then DC with HEP  Plan: Continue per plan of care

## 2018-08-17 ENCOUNTER — APPOINTMENT (OUTPATIENT)
Dept: PHYSICAL THERAPY | Facility: CLINIC | Age: 63
End: 2018-08-17
Payer: MEDICARE

## 2018-08-20 ENCOUNTER — OFFICE VISIT (OUTPATIENT)
Dept: PHYSICAL THERAPY | Facility: CLINIC | Age: 63
End: 2018-08-20
Payer: MEDICARE

## 2018-08-20 DIAGNOSIS — M79.7 FIBROMYALGIA: ICD-10-CM

## 2018-08-20 DIAGNOSIS — M17.0 PRIMARY OSTEOARTHRITIS OF BOTH KNEES: Primary | ICD-10-CM

## 2018-08-20 PROCEDURE — 97150 GROUP THERAPEUTIC PROCEDURES: CPT | Performed by: PHYSICAL THERAPIST

## 2018-08-20 PROCEDURE — 97110 THERAPEUTIC EXERCISES: CPT | Performed by: PHYSICAL THERAPIST

## 2018-08-20 PROCEDURE — 97140 MANUAL THERAPY 1/> REGIONS: CPT | Performed by: PHYSICAL THERAPIST

## 2018-08-20 NOTE — PROGRESS NOTES
Daily Note     Today's date: 2018  Patient name: Glenys Roa  : 1955  MRN: [id number]  Referring provider: Ijeoma Hernandez MD  Dx:   Encounter Diagnosis     ICD-10-CM    1  Primary osteoarthritis of both knees M17 0    2  Fibromyalgia M79 7                   Subjective:  Patient reports the knees are feeling good after the injection by the MD   Patient reports she is getting ready for the surgery on 10/27  Patient reports 2/10 pain levels in the knees at the start of the session and 1/10 pain levels post session  Objective: See treatment diary below    Precautions B/L Knee OA; Fibromyalgia    Manual  8/10  8/15 8/20     STM and TPR prn Bilat knee and LE stretching  With manual  LE traction  20 min 15 min  15 min                                                                    Exercise Diary  8/10  8/15 8/20      NuStep L5 10 min L5 10 min 10 min L5     Leg and calf press    2x10   30#      Monster Walks NT   6x10 Feet Light Blue      Side Step and Squat NT   8x10 Feet Light Blue      Fwd/Lat Step Ups    2x10 Bilat   6" step      Step Up and Over NT   15x Bilat   6" step      Front and lateral step up          Single Leg Stance         Tandem Walk on Foam NT         Side step on foam   NT          Biodex LOS            Biodex Random            Biodex Random            Biodex Maze            Biodex Maze            Gait Training            Stair HR/TR             Stair gastroc stretch  5x15" Hold Bilat    5x15" Hold   Bilat      LTR with Tball  10x5"hold  Bilat   2x10 Bilat 5" hold      DKTC with Tball  10x5"hold   2x10 Orange Tball            Modalities 8/10  8/15 8/20     MHP to Low Back and B/L Knees in seated 15 min  Bilat HS and knees  15 min  15 min                                        Assessment: Tolerated treatment well    PT notes continuation of progression of TE program with focus on gait/balance and manual therapy to decrease pain levels and improve functional limitations to meet therapy goals  Plan: Continue per plan of care

## 2018-08-22 ENCOUNTER — APPOINTMENT (OUTPATIENT)
Dept: PHYSICAL THERAPY | Facility: CLINIC | Age: 63
End: 2018-08-22
Payer: MEDICARE

## 2018-08-24 ENCOUNTER — OFFICE VISIT (OUTPATIENT)
Dept: PHYSICAL THERAPY | Facility: CLINIC | Age: 63
End: 2018-08-24
Payer: MEDICARE

## 2018-08-24 DIAGNOSIS — M17.0 PRIMARY OSTEOARTHRITIS OF BOTH KNEES: Primary | ICD-10-CM

## 2018-08-24 DIAGNOSIS — M79.7 FIBROMYALGIA: ICD-10-CM

## 2018-08-24 PROCEDURE — 97150 GROUP THERAPEUTIC PROCEDURES: CPT | Performed by: PHYSICAL THERAPIST

## 2018-08-24 PROCEDURE — 97110 THERAPEUTIC EXERCISES: CPT | Performed by: PHYSICAL THERAPIST

## 2018-08-24 PROCEDURE — G8979 MOBILITY GOAL STATUS: HCPCS | Performed by: PHYSICAL THERAPIST

## 2018-08-24 PROCEDURE — 97140 MANUAL THERAPY 1/> REGIONS: CPT | Performed by: PHYSICAL THERAPIST

## 2018-08-24 PROCEDURE — 97535 SELF CARE MNGMENT TRAINING: CPT | Performed by: PHYSICAL THERAPIST

## 2018-08-24 PROCEDURE — G8980 MOBILITY D/C STATUS: HCPCS | Performed by: PHYSICAL THERAPIST

## 2018-08-24 NOTE — PROGRESS NOTES
Daily Note     Today's date: 2018  Patient name: Robinson Gallegos  : 1955  MRN: [id number]  Referring provider: Irlanda Morrow MD  Dx:   Encounter Diagnosis     ICD-10-CM    1  Primary osteoarthritis of both knees M17 0    2  Fibromyalgia M79 7                   Subjective:  Patient reports she is happy with current progress and wants to continue getting the legs stronger before her surgery in October  Patient reports she wants to enroll in wellness program when she is done with therapy  Patient reports 2/10 pain levels t/o the session  Objective: See treatment diary below    Precautions B/L Knee OA; Fibromyalgia    Manual  8/10  8/15 8/20 8/24    STM and TPR prn Bilat knee and LE stretching  With manual  LE traction  20 min 15 min  15 min                                                                    Exercise Diary  8/10  8/15 8/20  8/24    NuStep L5 10 min L5 10 min 10 min L5 10 min L5     Leg and calf press    2x10   30#      Monster Walks NT   6x10 Feet Light Blue      Side Step and Squat NT   8x10 Feet Light Blue      Fwd/Lat Step Ups    2x10 Bilat   6" step      Step Up and Over NT   15x Bilat   6" step      Front and lateral step up          Single Leg Stance         Tandem Walk on Foam NT         Side step on foam   NT          Biodex LOS            Biodex Random            Biodex Random            Biodex Maze            Biodex Maze            HEP update/review       X     Stair HR/TR             Stair gastroc stretch  5x15" Hold Bilat    5x15" Hold   Bilat      LTR with Tball  10x5"hold  Bilat   2x10 Bilat 5" hold      DKTC with Tball  10x5"hold   2x10 Orange Tball            Modalities 8/10  8/15 8/20 8/24    MHP to Low Back and B/L Knees in seated 15 min  Bilat HS and knees  15 min  15 min                                            Assessment: Tolerated treatment well  PT notes the patient has the majority of therapy goals and is ready for a DC with HEP  Plan: DC with HEP

## 2018-08-24 NOTE — LETTER
2018    Rebeca Boles MD  [facility]    Patient: Prince Vuong   YOB: 1955   Date of Visit: 2018     Encounter Diagnosis     ICD-10-CM    1  Primary osteoarthritis of both knees M17 0    2  Fibromyalgia M79 7        Dear Dr Daysi Ingram:    Please review the attached Plan of Care from [facility] recent visit  Please verify that you agree skilled therapy will be discharged by signing the attached document and sending it back to our office  If you have any questions or concerns, please don't hesitate to call  Sincerely,    Michael Rudolph PT      Referring Provider:      I certify that I have read the below Plan of Care and certify the need for these services furnished under this plan of treatment while under my care  Rebeca Boles MD  [facility]: [phone]          Daily Note     Today's date: 2018  Patient name: Prince Vuong  : 1955  MRN: [id number]  Referring provider: Colette Santiago MD  Dx:   Encounter Diagnosis     ICD-10-CM    1  Primary osteoarthritis of both knees M17 0    2  Fibromyalgia M79 7                   Subjective:  Patient reports she is happy with current progress and wants to continue getting the legs stronger before her surgery in October  Patient reports she wants to enroll in wellness program when she is done with therapy  Patient reports 2/10 pain levels t/o the session         Objective: See treatment diary below    Precautions B/L Knee OA; Fibromyalgia    Manual  8/10  8/15 8/20 8/24    STM and TPR prn Bilat knee and LE stretching  With manual  LE traction  20 min 15 min  15 min                                                                    Exercise Diary  8/10  8/15 8/20  8/24    NuStep L5 10 min L5 10 min 10 min L5 10 min L5     Leg and calf press    2x10   30#      Monster Walks NT   6x10 Feet Light Blue      Side Step and Squat NT   8x10 Feet Light Blue      Fwd/Lat Step Ups    2x10 Bilat   6" step      Step Up and Over NT   15x Bilat   6" step      Front and lateral step up          Single Leg Stance         Tandem Walk on Foam NT         Side step on foam   NT          Biodex LOS            Biodex Random            Biodex Random            Biodex Maze            Biodex Maze            HEP update/review       X     Stair HR/TR             Stair gastroc stretch  5x15" Hold Bilat    5x15" Hold   Bilat      LTR with Tball  10x5"hold  Bilat   2x10 Bilat 5" hold      DKTC with Tball  10x5"hold   2x10 Orange Tball            Modalities 8/10  8/15 8/20 8/24    MHP to Low Back and B/L Knees in seated 15 min  Bilat HS and knees  15 min  15 min                                            Assessment: Tolerated treatment well  PT notes the patient has the majority of therapy goals and is ready for a DC with HEP  Plan: DC with HEP  PT Discharge    Today's date: 2018  Patient name: Cj Mckeon  : 1955  MRN: [id number]  Referring provider: Roselia Warren MD  Dx:   Encounter Diagnosis     ICD-10-CM    1  Primary osteoarthritis of both knees M17 0    2  Fibromyalgia M79 7                   Assessment  Impairments: abnormal gait, activity intolerance, impaired balance, impaired physical strength, lacks appropriate home exercise program, pain with function and poor posture     Assessment details: PT notes the patient has met the majority of therapy goals and has met MMI with current course of skilled therapy so PT with decision to DC with HEP  Understanding of Dx/Px/POC: good   Prognosis: good    Goals  STG  1  Pt will decrease pain by 25-50%-Partial MET  2  Pt will demonstrate improved gait mechanics-MET    3  Initiate HEP-MET    LTG  1  Pt will improve BLE mm strength by 1-2 mm grades-MET   2  Pt will demonstrate improved standing balance-MET   3   Pt will ambulate safely without AD use for community distances-Partial MET   4   DC with HEP-Progressing       Plan  Patient would benefit from: PT eval and skilled physical therapy  Planned modality interventions: unattended electrical stimulation and thermotherapy: hydrocollator packs  Planned therapy interventions: abdominal trunk stabilization, balance, flexibility, gait training, graded exercise, home exercise program, manual therapy, massage, neuromuscular re-education, patient education, postural training, strengthening, stretching and therapeutic exercise  Frequency: 3x week  Duration in visits: 1  Duration in weeks: 1  Treatment plan discussed with: patient  Plan details: PT notes decision to DC with HEP  Subjective Evaluation    History of Present Illness  Mechanism of injury: Pt presents with chief complaint of B/L knee OA and chronic hx of fibromyalgia  Pt was seen by MD on  and was prescribed a course of skilled PT to treat current symptoms  Pt reports she is receivng Euflexxa injections but feels that they are no longer being effective  Pt is scheduled to receive last Euflexxa injection tomorrow  Pt has been using SPC for approximately 6 months, starting with occasional use for long distances and now is typically all the time, primarily for balance and assistance with gait  Pt notes majority of symptoms in R knee, with tightness in BLE mm, especially in the posterior chain  Pt also notes TrPs along medial aspect of B/L knee and a feeling of fluid accumulation in B/L knees  Presently the patient has attended 24 sessions of skilled therapy and feels 70% improvement with overall increase strength in the legs and some decrease in intensity and duration of symptoms in the low back and LE  Patient reports she is happy with current progress and is ready for a DC with HEP     Recurrent probem    Quality of life: good    Pain  Current pain ratin  At best pain ratin  At worst pain rating: 10  Location: Bilateral lower extremity  Quality: tight, dull ache and sharp  Relieving factors: rest, heat and medications  Aggravating factors: walking and stair climbing (overexertion, weather)  Progression: improved    Social Support    Employment status: not working    Diagnostic Tests  X-ray: abnormal  Treatments  Previous treatment: injection treatment, physical therapy and massage (massage has helped, chiropractor has not)  Patient Goals  Patient goals for therapy: decreased pain, improved balance, increased motion and increased strength  Patient goal: to walk without cane with minimal pain        Objective     Postural Observations  Seated posture: fair  Standing posture: fair        Palpation   Left   Tenderness of the erector spinae, distal biceps femoris, distal semimembranosus, distal semitendinosus, gluteus christiano, gluteus medius and lumbar paraspinals  Right Tenderness of the erector spinae, distal biceps femoris, distal semimembranosus, distal semitendinosus, gluteus christiano, gluteus medius, lateral gastrocnemius, lumbar paraspinals, proximal biceps femoris, proximal semimembranosus and proximal semitendinosus  Additional Palpation Details  PT notes the patient with + pain over the distal HS and anterior tibialis area of the bilateral LE       Tenderness     Left Hip   Tenderness in the PSIS and greater trochanter  Right Hip   Tenderness in the PSIS, greater trochanter and ischial tuberosity  Right Knee   Tenderness in the lateral joint line       Neurological Testing     Reflexes   Left   Patellar (L4): normal (2+)  Achilles (S1): normal (2+)    Right   Patellar (L4): normal (2+)  Achilles (S1): normal (2+)    Active Range of Motion   Left Hip   Flexion: 67 degrees     Right Hip   Flexion: 64 degrees     Additional Active Range of Motion Details  PT notes the pt with WNL AROM of the L/S and BLE    Strength/Myotome Testing     Left Hip   Planes of Motion   Flexion: 4+  Extension: 5  Abduction: 5  Adduction: 5    Right Hip   Planes of Motion   Flexion: 4+  Extension: 5  Abduction: 5  Adduction: 5    Left Knee   Flexion: 5  Extension: 4+    Right Knee   Flexion: 5  Extension: 4+    Left Ankle/Foot   Dorsiflexion: 5  Plantar flexion: 5    Right Ankle/Foot   Dorsiflexion: 5  Plantar flexion: 5    Additional Strength Details  Pain with b/l resisted knee ext and R hip flexion (mm tightness)    Tests     Additional Tests Details  PT notes the pt with WNL ligament stability B/L, (-) meniscal testing, and equal mm activation    Ambulation   Weight-Bearing Status   Assistive device used: single point cane and none    Additional Weight-Bearing Status Details  Patient depending on pain levels may require a SPC with ambulation if pain levels are higher    Ambulation: Level Surfaces   Ambulation with assistive device: independent    Ambulation: Stairs   Pattern: non-reciprocal  Railings: one rail  Pattern: non-reciprocal  Railings: one rail    Observational Gait   Gait: antalgic   Decreased right stance time and right step length  Quality of Movement During Gait   Trunk  Forward lean  Pelvis    Pelvis (Left): Negative Trendelenburg  Pelvis (Right): Negative Trendelenburg         Flowsheet Rows      Most Recent Value   PT/OT G-Codes   FOTO information reviewed  Yes   Assessment Type  Discharge   G code set  Mobility: Walking & Moving Around   Mobility: Walking and Moving Around Goal Status ()  CJ   Mobility: Walking and Moving Around Discharge Status ()  CJ          Precautions B/L Knee OA; Fibromyalgia

## 2018-09-10 ENCOUNTER — TRANSCRIBE ORDERS (OUTPATIENT)
Dept: PHYSICAL THERAPY | Facility: CLINIC | Age: 63
End: 2018-09-10

## 2018-09-10 DIAGNOSIS — M17.0 PRIMARY OSTEOARTHRITIS OF BOTH KNEES: Primary | ICD-10-CM

## 2018-09-10 DIAGNOSIS — M79.7 FIBROMYALGIA: ICD-10-CM

## 2018-10-03 ENCOUNTER — OFFICE VISIT (OUTPATIENT)
Dept: FAMILY MEDICINE CLINIC | Facility: CLINIC | Age: 63
End: 2018-10-03
Payer: COMMERCIAL

## 2018-10-03 VITALS
HEIGHT: 64 IN | SYSTOLIC BLOOD PRESSURE: 124 MMHG | WEIGHT: 195.4 LBS | DIASTOLIC BLOOD PRESSURE: 72 MMHG | BODY MASS INDEX: 33.36 KG/M2

## 2018-10-03 DIAGNOSIS — R13.10 DYSPHAGIA, UNSPECIFIED TYPE: Primary | ICD-10-CM

## 2018-10-03 DIAGNOSIS — K22.2 SCHATZKI'S RING: ICD-10-CM

## 2018-10-03 DIAGNOSIS — K22.2 STRICTURE ESOPHAGUS: ICD-10-CM

## 2018-10-03 PROBLEM — M17.11 PRIMARY OSTEOARTHRITIS OF RIGHT KNEE: Status: ACTIVE | Noted: 2018-08-13

## 2018-10-03 PROBLEM — M25.569 KNEE PAIN: Status: ACTIVE | Noted: 2018-10-03

## 2018-10-03 PROBLEM — M76.899 ENTHESOPATHY OF HIP REGION: Status: ACTIVE | Noted: 2018-10-03

## 2018-10-03 PROCEDURE — 99213 OFFICE O/P EST LOW 20 MIN: CPT | Performed by: PHYSICIAN ASSISTANT

## 2018-10-03 RX ORDER — TRAZODONE HYDROCHLORIDE 150 MG/1
100 TABLET ORAL
COMMUNITY
Start: 2011-07-15

## 2018-10-03 RX ORDER — TRIAMCINOLONE ACETONIDE 1 MG/G
CREAM TOPICAL 2 TIMES DAILY
COMMUNITY
Start: 2013-07-17 | End: 2018-10-16

## 2018-10-03 RX ORDER — ERGOCALCIFEROL 1.25 MG/1
1 CAPSULE ORAL WEEKLY
COMMUNITY
Start: 2011-07-07

## 2018-10-03 RX ORDER — TRAMADOL HYDROCHLORIDE 50 MG/1
1-2 TABLET ORAL EVERY 6 HOURS PRN
COMMUNITY
Start: 2014-11-03 | End: 2018-10-16

## 2018-10-03 RX ORDER — ERGOCALCIFEROL (VITAMIN D2) 10 MCG
1 TABLET ORAL DAILY
COMMUNITY
End: 2018-10-03 | Stop reason: ALTCHOICE

## 2018-10-03 RX ORDER — FERROUS SULFATE 325(65) MG
TABLET ORAL
COMMUNITY
End: 2018-10-03 | Stop reason: ALTCHOICE

## 2018-10-03 RX ORDER — ASCORBIC ACID 500 MG
1 TABLET ORAL DAILY
COMMUNITY
End: 2018-10-03 | Stop reason: ALTCHOICE

## 2018-10-03 RX ORDER — MECLIZINE HYDROCHLORIDE 25 MG/1
1 TABLET ORAL EVERY 8 HOURS PRN
COMMUNITY
Start: 2017-03-16 | End: 2018-12-31

## 2018-10-03 RX ORDER — DULOXETIN HYDROCHLORIDE 60 MG/1
1 CAPSULE, DELAYED RELEASE ORAL DAILY
COMMUNITY

## 2018-10-03 RX ORDER — DULOXETIN HYDROCHLORIDE 30 MG/1
60 CAPSULE, DELAYED RELEASE ORAL DAILY
COMMUNITY
End: 2019-05-29 | Stop reason: ALTCHOICE

## 2018-10-03 NOTE — PROGRESS NOTES
Assessment/Plan:    Will refer back to Dr Gale Barraza as that is the physician she saw in 2014  Diagnoses and all orders for this visit:    Dysphagia, unspecified type  -     Ambulatory referral to Gastroenterology; Future    Stricture esophagus  -     Ambulatory referral to Gastroenterology; Future    Schatzki's ring  -     Ambulatory referral to Gastroenterology; Future    Other orders  -     Discontinue: cyanocobalamin 500 MCG tablet; Take 1 tablet by mouth daily  -     Discontinue: ascorbic acid (VITAMIN C) 500 MG tablet; Take 1 tablet by mouth daily  -     Discontinue: ferrous sulfate ([facility] IRON) 325 (65 Fe) mg tablet; Take by mouth  -     Discontinue: Multiple Vitamin (DAILY VALUE MULTIVITAMIN) TABS; Take 1 tablet by mouth daily  -     triamcinolone (KENALOG) 0 1 % cream; Apply topically 2 (two) times a day  -     ergocalciferol (VITAMIN D2) 50,000 units; Take 1 capsule by mouth once a week  -     DULoxetine (CYMBALTA) 30 mg delayed release capsule; Take 1 capsule by mouth daily  -     DULoxetine (CYMBALTA) 60 mg delayed release capsule; Take 1 capsule by mouth daily  -     meclizine (ANTIVERT) 25 mg tablet; Take 1 tablet by mouth 3 (three) times a day  -     traMADol (ULTRAM) 50 mg tablet; Take 1-2 tablets by mouth every 6 (six) hours as needed  -     traZODone (DESYREL) 150 mg tablet; Take 1 tablet by mouth daily          Subjective:      Patient ID: Emma Valdez is a 59 y o  female  Elder Mini is here today for a referral to GI  She recently noticed an increase in dysphagia and has a history of Schatzki's ring with esophageal dilation in 2014   She is scheduled for TKR with Dr Edmund William on 10/24/18, and after speaking with the anesthesiologist during pre-op clearance, she needs to see GI for evaluation/clearance at the request of the anesthesia team         The following portions of the patient's history were reviewed and updated as appropriate:   She  has a past medical history of Arthritis; Eczema; Fibromyalgia; Kidney stones; and Sleep apnea  She   Patient Active Problem List    Diagnosis Date Noted    Enthesopathy of hip region 10/03/2018    Knee pain 10/03/2018    Schatzki's ring 10/03/2018    Primary osteoarthritis of right knee 08/13/2018    Benign positional vertigo 07/07/2016    Neck pain 09/23/2015    Nephrolithiasis 06/12/2015    Clostridium difficile colitis 02/09/2015    Clinical xerostomia 12/10/2014    Dysphagia 12/10/2014    Irritable bowel syndrome 12/10/2014    GERD (gastroesophageal reflux disease) 12/01/2014    Stricture esophagus 12/01/2014    Depression with anxiety 11/03/2014    Elevated ALT measurement 04/25/2014    Mammogram abnormal 12/10/2013    Restless legs syndrome 09/16/2013    Sleep apnea 09/16/2013    Memory loss 04/18/2013    Murmur 04/18/2013    Allergic rhinitis 09/19/2012     She  has a past surgical history that includes Kidney stone surgery; Tubal ligation; Esophageal dilation; Colonoscopy; Cystoscopy w/ ureteral stent placement (Right); Cystoscopy; Esophagogastroduodenoscopy (02/10/2015); and Tuboplasty / tubotubal anastomosis  Her family history includes Alcohol abuse in her father; Breast cancer in her mother; Cancer in her father and mother; Depression in her father; Diabetes in her mother; Diabetes type II in her father; Heart disease in her father and mother; Hypertension in her sister; Other in her family; Stroke in her mother; Thyroid disease in her father  She  reports that she has never smoked  She has never used smokeless tobacco  She reports that she drinks alcohol  She reports that she does not use drugs  Current Outpatient Prescriptions   Medication Sig Dispense Refill    cyclobenzaprine (FLEXERIL) 10 mg tablet Take 10 mg by mouth as needed for muscle spasms        DULoxetine (CYMBALTA) 30 mg delayed release capsule Take 1 capsule by mouth daily      DULoxetine (CYMBALTA) 60 mg delayed release capsule Take 1 capsule by mouth daily  ergocalciferol (VITAMIN D2) 50,000 units Take 1 capsule by mouth once a week      meclizine (ANTIVERT) 25 mg tablet Take 1 tablet by mouth 3 (three) times a day      traMADol (ULTRAM) 50 mg tablet Take 1-2 tablets by mouth every 6 (six) hours as needed      traZODone (DESYREL) 150 mg tablet Take 1 tablet by mouth daily      triamcinolone (KENALOG) 0 1 % cream Apply topically 2 (two) times a day       No current facility-administered medications for this visit  Current Outpatient Prescriptions on File Prior to Visit   Medication Sig    cyclobenzaprine (FLEXERIL) 10 mg tablet Take 10 mg by mouth as needed for muscle spasms   [DISCONTINUED] dicyclomine (BENTYL) 10 mg capsule Take 1 capsule by mouth 4 (four) times a day (before meals and at bedtime)    [DISCONTINUED] DULoxetine (CYMBALTA) 30 mg delayed release capsule Take 30 mg by mouth daily   [DISCONTINUED] ergocalciferol (VITAMIN D2) 50,000 units Take 50,000 Units by mouth once a week    [DISCONTINUED] meclizine (ANTIVERT) 25 mg tablet Take 1 tablet (25 mg total) by mouth 3 (three) times a day as needed for dizziness   [DISCONTINUED] ondansetron (ZOFRAN-ODT) 4 mg disintegrating tablet Take 1 tablet by mouth every 4 (four) hours as needed for nausea or vomiting    [DISCONTINUED] traMADol (ULTRAM) 50 mg tablet Take 100 mg by mouth every 6 (six) hours as needed for moderate pain (Patient takes 2 tablets at bedtime)   [DISCONTINUED] traZODone (DESYREL) 150 mg tablet Take 150 mg by mouth daily at bedtime  No current facility-administered medications on file prior to visit  She is allergic to codeine; epinephrine; latex; and neosporin plus max st     Review of Systems   Constitutional: Negative for chills, fatigue and fever  Respiratory: Negative for cough, choking, shortness of breath and wheezing  Cardiovascular: Negative for chest pain     Gastrointestinal: Negative for abdominal distention, abdominal pain, constipation, diarrhea, nausea and vomiting  Dysphagia, especially with meat   Genitourinary: Negative for dysuria and urgency  Musculoskeletal: Positive for arthralgias (knee)  Skin: Negative for rash  Neurological: Negative for dizziness, light-headedness and headaches  Objective:      /72 (BP Location: Right arm, Patient Position: Sitting)   Ht 5' 4" (1 626 m)   Wt 88 6 kg (195 lb 6 4 oz)   BMI 33 54 kg/m²          Physical Exam   Constitutional: She is oriented to person, place, and time  She appears well-developed and well-nourished  No distress  HENT:   Head: Normocephalic and atraumatic  Right Ear: External ear normal    Left Ear: External ear normal    Mouth/Throat: Oropharynx is clear and moist  No oropharyngeal exudate  Eyes: Conjunctivae are normal  Right eye exhibits no discharge  Left eye exhibits no discharge  No scleral icterus  Neck: Neck supple  No thyromegaly present  Cardiovascular: Normal rate, regular rhythm and normal heart sounds  Pulmonary/Chest: Effort normal and breath sounds normal  No respiratory distress  She has no wheezes  Lymphadenopathy:     She has no cervical adenopathy  Neurological: She is alert and oriented to person, place, and time  Skin: She is not diaphoretic  Psychiatric: She has a normal mood and affect  Her behavior is normal  Judgment and thought content normal    Vitals reviewed

## 2018-10-16 ENCOUNTER — CONSULT (OUTPATIENT)
Dept: FAMILY MEDICINE CLINIC | Facility: CLINIC | Age: 63
End: 2018-10-16
Payer: MEDICARE

## 2018-10-16 VITALS
SYSTOLIC BLOOD PRESSURE: 130 MMHG | HEIGHT: 64 IN | BODY MASS INDEX: 33.63 KG/M2 | DIASTOLIC BLOOD PRESSURE: 72 MMHG | WEIGHT: 197 LBS

## 2018-10-16 DIAGNOSIS — M17.11 PRIMARY OSTEOARTHRITIS OF RIGHT KNEE: ICD-10-CM

## 2018-10-16 DIAGNOSIS — M79.7 FIBROMYALGIA: ICD-10-CM

## 2018-10-16 DIAGNOSIS — H81.10 BENIGN PAROXYSMAL POSITIONAL VERTIGO, UNSPECIFIED LATERALITY: ICD-10-CM

## 2018-10-16 DIAGNOSIS — K58.9 IRRITABLE BOWEL SYNDROME, UNSPECIFIED TYPE: ICD-10-CM

## 2018-10-16 DIAGNOSIS — Z01.818 PRE-OP EXAMINATION: Primary | ICD-10-CM

## 2018-10-16 PROCEDURE — 99215 OFFICE O/P EST HI 40 MIN: CPT | Performed by: FAMILY MEDICINE

## 2018-10-16 NOTE — PROGRESS NOTES
Subjective:     Hiro Mosley is a 59 y o  female who presents to the office today for a preoperative consultation at the request of surgeon Dr Tea Benavides who plans on performing R TKR on October 24  This consultation is requested for the specific conditions prompting preoperative evaluation (i e  because of potential affect on operative risk): Hx  Of vertigo and IBS  Planned anesthesia: epidural and spinal  The patient has the following known anesthesia issues: None  Patients bleeding risk: no recent abnormal bleeding  The following portions of the patient's history were reviewed and updated as appropriate:   She  has a past medical history of Arthritis; Eczema; Fibromyalgia; Kidney stones; and Sleep apnea  She   Patient Active Problem List    Diagnosis Date Noted    Fibromyalgia 10/16/2018    Enthesopathy of hip region 10/03/2018    Knee pain 10/03/2018    Schatzki's ring 10/03/2018    Primary osteoarthritis of right knee 08/13/2018    Benign positional vertigo 07/07/2016    Neck pain 09/23/2015    Nephrolithiasis 06/12/2015    Clostridium difficile colitis 02/09/2015    Clinical xerostomia 12/10/2014    Dysphagia 12/10/2014    Irritable bowel syndrome 12/10/2014    GERD (gastroesophageal reflux disease) 12/01/2014    Stricture esophagus 12/01/2014    Depression with anxiety 11/03/2014    Elevated ALT measurement 04/25/2014    Mammogram abnormal 12/10/2013    Restless legs syndrome 09/16/2013    Sleep apnea 09/16/2013    Memory loss 04/18/2013    Murmur 04/18/2013    Allergic rhinitis 09/19/2012     She  has a past surgical history that includes Kidney stone surgery; Tubal ligation; Esophageal dilation; Colonoscopy; Cystoscopy w/ ureteral stent placement (Right); Cystoscopy; Esophagogastroduodenoscopy (02/10/2015); and Tuboplasty / tubotubal anastomosis    Her family history includes Alcohol abuse in her father; Breast cancer in her mother; Cancer in her father and mother; Depression in her father; Diabetes in her mother; Diabetes type II in her father; Heart disease in her father and mother; Hypertension in her sister; Other in her family; Stroke in her mother; Thyroid disease in her father  She  reports that she has never smoked  She has never used smokeless tobacco  She reports that she drinks alcohol  She reports that she does not use drugs  Current Outpatient Prescriptions   Medication Sig Dispense Refill    cyclobenzaprine (FLEXERIL) 10 mg tablet Take 10 mg by mouth as needed for muscle spasms   DULoxetine (CYMBALTA) 30 mg delayed release capsule Take 1 capsule by mouth daily      DULoxetine (CYMBALTA) 60 mg delayed release capsule Take 1 capsule by mouth daily      ergocalciferol (VITAMIN D2) 50,000 units Take 1 capsule by mouth once a week      meclizine (ANTIVERT) 25 mg tablet Take 1 tablet by mouth 3 (three) times a day      traZODone (DESYREL) 150 mg tablet Take 1 tablet by mouth daily       No current facility-administered medications for this visit  Current Outpatient Prescriptions on File Prior to Visit   Medication Sig    cyclobenzaprine (FLEXERIL) 10 mg tablet Take 10 mg by mouth as needed for muscle spasms   DULoxetine (CYMBALTA) 30 mg delayed release capsule Take 1 capsule by mouth daily    DULoxetine (CYMBALTA) 60 mg delayed release capsule Take 1 capsule by mouth daily    ergocalciferol (VITAMIN D2) 50,000 units Take 1 capsule by mouth once a week    meclizine (ANTIVERT) 25 mg tablet Take 1 tablet by mouth 3 (three) times a day    traZODone (DESYREL) 150 mg tablet Take 1 tablet by mouth daily    [DISCONTINUED] traMADol (ULTRAM) 50 mg tablet Take 1-2 tablets by mouth every 6 (six) hours as needed    [DISCONTINUED] triamcinolone (KENALOG) 0 1 % cream Apply topically 2 (two) times a day     No current facility-administered medications on file prior to visit        She is allergic to codeine; epinephrine; latex; and neosporin plus max st     Review of Systems  Pertinent items are noted in HPI  Objective:  Physical Exam   Constitutional: She is oriented to person, place, and time  She appears well-developed and well-nourished  No distress  Cardiovascular: Normal rate and regular rhythm  Exam reveals no gallop and no friction rub  No murmur heard  Pulmonary/Chest: Effort normal and breath sounds normal  No respiratory distress  She has no wheezes  She has no rales  Musculoskeletal: She exhibits tenderness (R knee)  She exhibits no edema  Neurological: She is alert and oriented to person, place, and time  Skin: She is not diaphoretic  Psychiatric: She has a normal mood and affect  Her behavior is normal  Judgment and thought content normal    Vitals reviewed  Cardiographics  ECG: no prior ECG  Echocardiogram: not done    Imaging  Chest x-ray: normal     Lab Review   No visits with results within 2 Month(s) from this visit     Latest known visit with results is:   Lab on 09/13/2017   Component Date Value    TSH 3RD GENERATON 09/13/2017 0 769     Free T4 09/13/2017 1 30     Sodium 09/13/2017 142     Potassium 09/13/2017 3 8     Chloride 09/13/2017 108     CO2 09/13/2017 27     ANION GAP 09/13/2017 7     BUN 09/13/2017 12     Creatinine 09/13/2017 0 88     Glucose, Fasting 09/13/2017 87     Calcium 09/13/2017 9 2     AST 09/13/2017 16     ALT 09/13/2017 50     Alkaline Phosphatase 09/13/2017 90     Total Protein 09/13/2017 7 1     Albumin 09/13/2017 3 2*    Total Bilirubin 09/13/2017 0 49     eGFR 09/13/2017 71     WBC 09/13/2017 6 16     RBC 09/13/2017 4 53     Hemoglobin 09/13/2017 14 6     Hematocrit 09/13/2017 43 8     MCV 09/13/2017 97     MCH 09/13/2017 32 2     MCHC 09/13/2017 33 3     RDW 09/13/2017 12 9     MPV 09/13/2017 11 2     Platelets 17/96/0802 234     nRBC 09/13/2017 0     Neutrophils Relative 09/13/2017 53     Lymphocytes Relative 09/13/2017 36     Monocytes Relative 09/13/2017 8     Eosinophils Relative 09/13/2017 2     Basophils Relative 09/13/2017 1     Neutrophils Absolute 09/13/2017 3 28     Lymphocytes Absolute 09/13/2017 2 23     Monocytes Absolute 09/13/2017 0 50     Eosinophils Absolute 09/13/2017 0 11     Basophils Absolute 09/13/2017 0 03     Cholesterol 09/13/2017 160     Triglycerides 09/13/2017 82     HDL, Direct 09/13/2017 72*    LDL Calculated 09/13/2017 72         Assessment:     59 y o  female with planned surgery as above  Known risk factors for perioperative complications: None    Difficulty with intubation is not anticipated  Current medications which may produce withdrawal symptoms if withheld perioperatively: none      Plan:  Medically cleared for R TKR on 10/24/2018  1  Preoperative workup as follows chest x-ray, hemoglobin, hematocrit, electrolytes, creatinine, glucose  2  Change in medication regimen before surgery: Stop essential oils  Hold meds AM of surgery     3  Prophylaxis for cardiac events with perioperative beta-blockers: not indicated  4  Invasive hemodynamic monitoring perioperatively: not indicated    5  Deep vein thrombosis prophylaxis postoperatively:regimen to be chosen by surgical team   6  Surveillance for postoperative MI with ECG immediately postoperatively and on postoperative days 1 and 2 AND troponin levels 24 hours postoperatively and on day 4 or hospital discharge (whichever comes first): at the discretion of anesthesiologist   7  Other measures: none

## 2018-10-29 ENCOUNTER — EVALUATION (OUTPATIENT)
Dept: PHYSICAL THERAPY | Facility: CLINIC | Age: 63
End: 2018-10-29
Payer: MEDICARE

## 2018-10-29 ENCOUNTER — TRANSITIONAL CARE MANAGEMENT (OUTPATIENT)
Dept: FAMILY MEDICINE CLINIC | Facility: CLINIC | Age: 63
End: 2018-10-29

## 2018-10-29 DIAGNOSIS — R26.89 BALANCE PROBLEM: ICD-10-CM

## 2018-10-29 DIAGNOSIS — M25.561 ACUTE PAIN OF RIGHT KNEE: ICD-10-CM

## 2018-10-29 DIAGNOSIS — Z96.651 PRESENCE OF RIGHT ARTIFICIAL KNEE JOINT: Primary | ICD-10-CM

## 2018-10-29 PROCEDURE — G8979 MOBILITY GOAL STATUS: HCPCS | Performed by: PHYSICAL THERAPIST

## 2018-10-29 PROCEDURE — 97110 THERAPEUTIC EXERCISES: CPT | Performed by: PHYSICAL THERAPIST

## 2018-10-29 PROCEDURE — G8978 MOBILITY CURRENT STATUS: HCPCS | Performed by: PHYSICAL THERAPIST

## 2018-10-29 PROCEDURE — 97535 SELF CARE MNGMENT TRAINING: CPT | Performed by: PHYSICAL THERAPIST

## 2018-10-29 PROCEDURE — 97161 PT EVAL LOW COMPLEX 20 MIN: CPT | Performed by: PHYSICAL THERAPIST

## 2018-10-29 PROCEDURE — 97140 MANUAL THERAPY 1/> REGIONS: CPT | Performed by: PHYSICAL THERAPIST

## 2018-10-29 NOTE — PROGRESS NOTES
PT Evaluation     Today's date: 10/29/2018  Patient name: Stephanie Cline  : 1955  MRN: [id number]  Referring provider: Leelee Sharma MD  Dx:   Encounter Diagnosis     ICD-10-CM    1  Presence of right artificial knee joint Z96 651    2  Acute pain of right knee M25 561    3  Balance problem R26 89                   Assessment  Impairments: abnormal gait, abnormal or restricted ROM, activity intolerance, impaired balance, impaired physical strength, lacks appropriate home exercise program and pain with function    Assessment details: PT notes the patient with decrease ROM and strength t/o the right knee and LE with demonstration of antalgic gait pattern and balance s/p right TKR leading to increase swelling, pain levels and functional limitations with need for course of skilled therapy for 8 weeks with focus on gait/balance, posture, strengthening, manual therapy, analgesic modalities, and HEP  Understanding of Dx/Px/POC: good   Prognosis: good    Goals  ST  Initiate HEP  2  Decrease pain by 25-50%   3  Increase ROM by 25-50%  4  Increase strength by 1-2 mm grades  5  Patient ambulate with SPC or no AD   LT  Demonstration of improved gait pattern and balance with SPC or no AD   2  Decrease limitations with stairs, ADL, standing and walking  3  Improve ROM to 0-115+ in the right knee  4    DC with HEP    Plan  Patient would benefit from: PT eval and skilled physical therapy  Planned modality interventions: cryotherapy  Planned therapy interventions: manual therapy, neuromuscular re-education, patient education, postural training, self care, strengthening, stretching, therapeutic exercise, home exercise program, graded exercise, gait training, functional ROM exercises and flexibility  Frequency: 3x week  Duration in visits: 12  Duration in weeks: 4  Treatment plan discussed with: patient  Plan details: PT notes review of POC and findings with patient who is in agreement with PT recommendations of course of skilled therapy  Subjective Evaluation    History of Present Illness  Date of surgery: 10/24/2018  Mechanism of injury: surgery  Mechanism of injury: Patient reports dealing with bilateral knee pain for 20+ years from degenerative changes t/o the knee joints and fibromyalgia  Patient reports trials of skilled therapy and multiple injections in the knees with minimal change in status  Patient was found to be a candidate for joint replacement surgery  Patient underwent the right TKR surgery on 10/24/18 and was placed in rehab for 5 days  Patient recently released from acute rehab with orders for OPPT  PT notes significant PMH of depression, fibromyalgia, and anxiety  Pain  Current pain ratin  At best pain ratin  At worst pain rating: 10  Location: Right knee   Quality: sharp and throbbing  Relieving factors: ice and rest  Aggravating factors: standing, walking and stair climbing      Diagnostic Tests  X-ray: abnormal  Treatments  Previous treatment: physical therapy, injection treatment and medication  Current treatment: medication and physical therapy  Discharged from (in last 30 days): inpatient hospitalization  Patient Goals  Patient goals for therapy: decreased pain, decreased edema, improved balance, increased motion, increased strength and independence with ADLs/IADLs          Objective     Palpation     Right Tenderness of the adductor longus, iliopsoas and rectus femoris       Active Range of Motion     Right Hip   Flexion: 41 degrees   Abduction: 20 degrees     Right Knee   Flexion: 87 degrees with pain  Extension: -11 degrees with pain    Right Ankle/Foot   Normal active range of motion    Additional Active Range of Motion Details  PT notes the patient with decrease ROM and strength t/o the right knee and LE     Passive Range of Motion     Right Hip   Flexion: 65 degrees   Abduction: WFL    Right Knee   Flexion: 92 degrees with pain  Extension: -7 degrees with pain    Mobility   Patellar Mobility:     Right Knee   WFL: medial, lateral, superior and inferior    Patellar Static Positioning   Right Knee: [facility]    Strength/Myotome Testing     Right Hip   Planes of Motion   Flexion: 4-  Extension: 4  Abduction: 4  Adduction: 4  External rotation: 4-  Internal rotation: 4-    Right Knee   Flexion: 4-  Extension: 3+    Right Ankle/Foot   Dorsiflexion: 4  Plantar flexion: 4  Inversion: 4  Eversion: 4    Swelling     Left Knee Girth Measurement (cm)   Joint line: 43 cm    Right Knee Girth Measurement (cm)   Joint line: 46 cm    Ambulation   Weight-Bearing Status   Weight-Bearing Status (Right): weight-bearing as tolerated    Assistive device used: rollator walker    Observational Gait   Gait: antalgic   Decreased walking speed, stride length, right stance time, right swing time and right step length  Additional Observational Gait Details  PT notes the patient with demonstration of antalgic gait with decrease stance on the right, decrease tibial advancement on the right, decrease hip and knee flex, decrease step length and decrease shandra with rating of good with static and dynamic activities          Flowsheet Rows      Most Recent Value   PT/OT G-Codes   FOTO information reviewed  Yes   Assessment Type  Evaluation   G code set  Mobility: Walking & Moving Around   Mobility: Walking and Moving Around Current Status ()  CL   Mobility: Walking and Moving Around Goal Status ()  CJ          Precautions:  WBAT Right LE     Specialty Daily Treatment Diary     Manual  10/29       Right knee and LE stretching and mobs  15 min                                            Exercise Diary  10/29       Nu-step  10 min L3        Stand SLR all directions        Stand HR/TR        1/4 squats         Front and lateral step-up         Step over         SLS and tandem on foam         Rocker board         Monster walk        Side step and squat         High knee walk         [employer] Heel slides with strap         Bridge with AB/AD        SAQ         DKTC with Tball                                             Modalities 10/29       CP to the right knee in supine  15 min

## 2018-10-29 NOTE — LETTER
2018    MD Kerry Jackson [address] [place] [address]    Patient: Omkar Vazquez   YOB: 1955   Date of Visit: 10/29/2018     Encounter Diagnosis     ICD-10-CM    1  Presence of right artificial knee joint Z96 651    2  Acute pain of right knee M25 561    3  Balance problem R26 89        Dear Dr Chang Human:    Please review the attached Plan of Care from [address] [place] [address]'s recent visit  Please verify that you agree therapy should continue by signing the attached document and sending it back to our office  If you have any questions or concerns, please don't hesitate to call  Sincerely,    Gely Downey, PT      Referring Provider:      I certify that I have read the below Plan of Care and certify the need for these services furnished under this plan of treatment while under my care  MD Kerry Jackson [address] [facility]: [phone]          PT Evaluation     Today's date: 10/29/2018  Patient name: Omkar Vazquez  : 1955  MRN: [id number]  Referring provider: Ritika Vanegas MD  Dx:   Encounter Diagnosis     ICD-10-CM    1  Presence of right artificial knee joint Z96 651    2  Acute pain of right knee M25 561    3  Balance problem R26 89                   Assessment  Impairments: abnormal gait, abnormal or restricted ROM, activity intolerance, impaired balance, impaired physical strength, lacks appropriate home exercise program and pain with function    Assessment details: PT notes the patient with decrease ROM and strength t/o the right knee and LE with demonstration of antalgic gait pattern and balance s/p right TKR leading to increase swelling, pain levels and functional limitations with need for course of skilled therapy for 8 weeks with focus on gait/balance, posture, strengthening, manual therapy, analgesic modalities, and HEP      Understanding of Dx/Px/POC: good   Prognosis: good    Goals  ST  Initiate HEP  2  Decrease pain by 25-50%   3  Increase ROM by 25-50%  4  Increase strength by 1-2 mm grades  5  Patient ambulate with SPC or no AD   LT  Demonstration of improved gait pattern and balance with SPC or no AD   2  Decrease limitations with stairs, ADL, standing and walking  3  Improve ROM to 0-115+ in the right knee  4  DC with HEP    Plan  Patient would benefit from: PT eval and skilled physical therapy  Planned modality interventions: cryotherapy  Planned therapy interventions: manual therapy, neuromuscular re-education, patient education, postural training, self care, strengthening, stretching, therapeutic exercise, home exercise program, graded exercise, gait training, functional ROM exercises and flexibility  Frequency: 3x week  Duration in visits: 12  Duration in weeks: 4  Treatment plan discussed with: patient  Plan details: PT notes review of POC and findings with patient who is in agreement with PT recommendations of course of skilled therapy  Subjective Evaluation    History of Present Illness  Date of surgery: 10/24/2018  Mechanism of injury: surgery  Mechanism of injury: Patient reports dealing with bilateral knee pain for 20+ years from degenerative changes t/o the knee joints and fibromyalgia  Patient reports trials of skilled therapy and multiple injections in the knees with minimal change in status  Patient was found to be a candidate for joint replacement surgery  Patient underwent the right TKR surgery on 10/24/18 and was placed in rehab for 5 days  Patient recently released from acute rehab with orders for OPPT  PT notes significant PMH of depression, fibromyalgia, and anxiety      Pain  Current pain ratin  At best pain ratin  At worst pain rating: 10  Location: Right knee   Quality: sharp and throbbing  Relieving factors: ice and rest  Aggravating factors: standing, walking and stair climbing      Diagnostic Tests  X-ray: abnormal  Treatments  Previous treatment: physical therapy, injection treatment and medication  Current treatment: medication and physical therapy  Discharged from (in last 30 days): inpatient hospitalization  Patient Goals  Patient goals for therapy: decreased pain, decreased edema, improved balance, increased motion, increased strength and independence with ADLs/IADLs          Objective     Palpation     Right Tenderness of the adductor longus, iliopsoas and rectus femoris  Active Range of Motion     Right Hip   Flexion: 41 degrees   Abduction: 20 degrees     Right Knee   Flexion: 87 degrees with pain  Extension: -11 degrees with pain    Right Ankle/Foot   Normal active range of motion    Additional Active Range of Motion Details  PT notes the patient with decrease ROM and strength t/o the right knee and LE     Passive Range of Motion     Right Hip   Flexion: 65 degrees   Abduction: WFL    Right Knee   Flexion: 92 degrees with pain  Extension: -7 degrees with pain    Mobility   Patellar Mobility:     Right Knee   WFL: medial, lateral, superior and inferior    Patellar Static Positioning   Right Knee: WFL    Strength/Myotome Testing     Right Hip   Planes of Motion   Flexion: 4-  Extension: 4  Abduction: 4  Adduction: 4  External rotation: 4-  Internal rotation: 4-    Right Knee   Flexion: 4-  Extension: 3+    Right Ankle/Foot   Dorsiflexion: 4  Plantar flexion: 4  Inversion: 4  Eversion: 4    Swelling     Left Knee Girth Measurement (cm)   Joint line: 43 cm    Right Knee Girth Measurement (cm)   Joint line: 46 cm    Ambulation   Weight-Bearing Status   Weight-Bearing Status (Right): weight-bearing as tolerated    Assistive device used: rollator walker    Observational Gait   Gait: antalgic   Decreased walking speed, stride length, right stance time, right swing time and right step length       Additional Observational Gait Details  PT notes the patient with demonstration of antalgic gait with decrease stance on the right, decrease tibial advancement on the right, decrease hip and knee flex, decrease step length and decrease shandra with rating of good with static and dynamic activities          Flowsheet Rows      Most Recent Value   PT/OT G-Codes   FOTO information reviewed  Yes   Assessment Type  Evaluation   G code set  Mobility: Walking & Moving Around   Mobility: Walking and Moving Around Current Status ()  CL   Mobility: Walking and Moving Around Goal Status ()  CJ          Precautions:  WBAT Right LE     Specialty Daily Treatment Diary     Manual  10/29       Right knee and LE stretching and mobs  15 min                                            Exercise Diary  10/29       Nu-step  10 min L3        Stand SLR all directions        Stand HR/TR        1/4 squats         Front and lateral step-up         Step over         SLS and tandem on foam         Rocker board         Monster walk        Side step and squat         High knee walk         Quad set         Heel slides with strap         Bridge with AB/AD        SAQ         DKTC with Tball                                             Modalities 10/29       CP to the right knee in supine  15 min

## 2018-10-31 ENCOUNTER — OFFICE VISIT (OUTPATIENT)
Dept: PHYSICAL THERAPY | Facility: CLINIC | Age: 63
End: 2018-10-31
Payer: MEDICARE

## 2018-10-31 DIAGNOSIS — R26.89 BALANCE PROBLEM: ICD-10-CM

## 2018-10-31 DIAGNOSIS — M25.561 ACUTE PAIN OF RIGHT KNEE: ICD-10-CM

## 2018-10-31 DIAGNOSIS — Z96.651 PRESENCE OF RIGHT ARTIFICIAL KNEE JOINT: Primary | ICD-10-CM

## 2018-10-31 PROCEDURE — 97150 GROUP THERAPEUTIC PROCEDURES: CPT | Performed by: PHYSICAL THERAPIST

## 2018-10-31 PROCEDURE — 97116 GAIT TRAINING THERAPY: CPT | Performed by: PHYSICAL THERAPIST

## 2018-10-31 PROCEDURE — 97140 MANUAL THERAPY 1/> REGIONS: CPT | Performed by: PHYSICAL THERAPIST

## 2018-10-31 NOTE — PROGRESS NOTES
Daily Note     Today's date: 10/31/2018  Patient name: Andre Matthews  : 1955  MRN: [id number]  Referring provider: Dm Rodas MD  Dx:   Encounter Diagnosis     ICD-10-CM    1  Presence of right artificial knee joint Z96 651    2  Acute pain of right knee M25 561    3  Balance problem R26 89                   Subjective:  Patient reports the knee is feeling better with overall decrease pain/soreness but continuation of limitations with movement  Patient reports 2/10 pain levels at the start of the session in the right knee  Patient reports 3/10 pain levels in the right knee post session  Patient feels she received a good workout on the first session  Objective: See treatment diary below    Precautions:  WBAT Right LE     Specialty Daily Treatment Diary     Manual  10/29 10/31      Bilat knee and LE stretching and mobs  15 min  15 min                                           Exercise Diary  10/29 10/31      Nu-step  10 min L3  10 min L3       Stand SLR all directions  2x10 Bilat   1 0#       Stand HR/TR  2x10 Bilat       1/4 squats   2x10       Front and lateral step-up   10x Bilat   4" step       Step over   10x Bilat  4" step       SLS and tandem on foam         Rocker board         Monster walk        Side step and squat         High knee walk         Quad set         Heel slides with strap         Bridge with AB/AD  2x10   Bridge only       SAQ         DKTC with Tball                                             Modalities 10/29 10/31      CP to the right knee in supine  15 min  15 min                              Assessment: Tolerated treatment well  PT notes start of TE program with focus on gait/balance and manual therapy to decrease pain levels and improve functional limitations to meet therapy goals  PT notes the patient with decrease ROM t/o the right knee during manual therapy with need for continuation of skilled therapy  Plan: Continue per plan of care

## 2018-11-01 ENCOUNTER — OFFICE VISIT (OUTPATIENT)
Dept: PHYSICAL THERAPY | Facility: CLINIC | Age: 63
End: 2018-11-01
Payer: MEDICARE

## 2018-11-01 DIAGNOSIS — R26.89 BALANCE PROBLEM: ICD-10-CM

## 2018-11-01 DIAGNOSIS — Z96.651 PRESENCE OF RIGHT ARTIFICIAL KNEE JOINT: Primary | ICD-10-CM

## 2018-11-01 DIAGNOSIS — M25.561 ACUTE PAIN OF RIGHT KNEE: ICD-10-CM

## 2018-11-01 PROCEDURE — 97116 GAIT TRAINING THERAPY: CPT | Performed by: PHYSICAL THERAPIST

## 2018-11-01 PROCEDURE — 97140 MANUAL THERAPY 1/> REGIONS: CPT | Performed by: PHYSICAL THERAPIST

## 2018-11-01 PROCEDURE — 97110 THERAPEUTIC EXERCISES: CPT | Performed by: PHYSICAL THERAPIST

## 2018-11-01 PROCEDURE — 97150 GROUP THERAPEUTIC PROCEDURES: CPT | Performed by: PHYSICAL THERAPIST

## 2018-11-01 NOTE — PROGRESS NOTES
Daily Note     Today's date: 2018  Patient name: Mark Jaffe  : 1955  MRN: [id number]  Referring provider: Jina Ruvalcaba MD  Dx:   Encounter Diagnosis     ICD-10-CM    1  Presence of right artificial knee joint Z96 651    2  Acute pain of right knee M25 561    3  Balance problem R26 89                   Subjective:  Patient reports she feels she is overdoing it at home with the HEP with increasing repetitions so the patient states the knee is sore today  Patient reports 3/10 pain levels in the right knee at the start of the session  Post session, the patient reports the knee and leg are tired but overall the pain levels are down to 1/10  Objective: See treatment diary below    Precautions:  WBAT Right LE     Specialty Daily Treatment Diary     Manual  10/29 10/31 11/1     Bilat knee and LE stretching and mobs  15 min  15 min  15 min                                          Exercise Diary  10/29 10/31 11/1     Nu-step  10 min L3  10 min L3  10 min L4      Stand SLR all directions  2x10 Bilat   1 0#  2x10 Bilat  NW      Stand HR/TR  2x10 Bilat  On 4" step   2x10      1/4 squats   2x10       Front and lateral step-up   10x Bilat   4" step  10x Bilat   4" step      Step over   10x Bilat  4" step  10x Bilat   4" step      SLS and tandem on foam         Rocker board         Monster walk        Side step and squat         High knee walk         Quad set         Heel slides with strap         Bridge with AB/AD  2x10   Bridge only  2x10   Bridge Only      SAQ         DKTC with Tball                                             Modalities 10/29 10/31 11/1     CP to the right knee in supine  15 min  15 min 15 min                              Assessment: Tolerated treatment well  PT notes modified treatment secondary to patient subjective c/o fatigue and soreness in the LE  PT will continue to progress toward therapy goals and full TE session as tolerated by patient          Plan: Continue per plan of care

## 2018-11-05 ENCOUNTER — OFFICE VISIT (OUTPATIENT)
Dept: PHYSICAL THERAPY | Facility: CLINIC | Age: 63
End: 2018-11-05
Payer: MEDICARE

## 2018-11-05 DIAGNOSIS — M25.561 ACUTE PAIN OF RIGHT KNEE: ICD-10-CM

## 2018-11-05 DIAGNOSIS — Z96.651 PRESENCE OF RIGHT ARTIFICIAL KNEE JOINT: Primary | ICD-10-CM

## 2018-11-05 DIAGNOSIS — R26.89 BALANCE PROBLEM: ICD-10-CM

## 2018-11-05 PROCEDURE — 97150 GROUP THERAPEUTIC PROCEDURES: CPT | Performed by: PHYSICAL THERAPIST

## 2018-11-05 PROCEDURE — 97140 MANUAL THERAPY 1/> REGIONS: CPT | Performed by: PHYSICAL THERAPIST

## 2018-11-05 PROCEDURE — 97110 THERAPEUTIC EXERCISES: CPT | Performed by: PHYSICAL THERAPIST

## 2018-11-05 NOTE — PROGRESS NOTES
Daily Note     Today's date: 2018  Patient name: Emma Valdez  : 1955  MRN: [id number]  Referring provider: Venessa Garza MD  Dx:   Encounter Diagnosis     ICD-10-CM    1  Presence of right artificial knee joint Z96 651    2  Acute pain of right knee M25 561    3  Balance problem R26 89                   Subjective:  Patient reports the knee was swollen over the weekend from unknown cause leading to increase soreness in the back of the knee  Patient reports frustration with continuation of swelling and fluctuating pain levels  Patient reports 3/10 pain levels at the start of the session and 2/10 pain levels in the right knee post session  Objective: See treatment diary below    Precautions:  WBAT Right LE     Specialty Daily Treatment Diary     Manual  10/29 10/31 11/1 11/5     Bilat knee and LE stretching and mobs  15 min  15 min  15 min  15 min                                         Exercise Diary  10/29 10/31 11/1 11/5    Nu-step  10 min L3  10 min L3  10 min L4  10 min L3     Stand SLR all directions  2x10 Bilat   1 0#  2x10 Bilat  NW  2x10 Bilat     Stand HR/TR  2x10 Bilat  On 4" step   2x10  On 4" step   2x10 Bilat     1/4 squats   2x10   2x10     Front and lateral step-up   10x Bilat   4" step  10x Bilat   4" step  2x10 Bilat  4" step     Step over   10x Bilat  4" step  10x Bilat   4" step  10x Bilat   4" step     SLS and tandem on foam     3x15" Hold  Bilat  Tandem only     Rocker board         Monster walk        Side step and squat         High knee walk         Quad set         Heel slides with strap         Bridge with AB/AD  2x10   Bridge only  2x10   Bridge Only  2x10 Bridge only     SAQ         DKTC with Tball     10x5" Hold   Orange Tball                                         Modalities 10/29 10/31 11/1 11/5     CP to the right knee in supine  15 min  15 min 15 min  15 min                             Assessment: Tolerated treatment well    PT notes continuation of progression of TE program with focus on gait/balance and manual therapy to decrease pain levels and improve functional limitations to meet therapy goals  PT notes the patient required increase rest breaks t/o the session secondary to c/o posterior knee pain but PT notes the symptoms relegated to the distal HS area but no pain/tenderness in the gastroc/calf area with [facility] Test        Plan: Continue per plan of care

## 2018-11-06 ENCOUNTER — TRANSCRIBE ORDERS (OUTPATIENT)
Dept: PHYSICAL THERAPY | Facility: CLINIC | Age: 63
End: 2018-11-06

## 2018-11-07 ENCOUNTER — OFFICE VISIT (OUTPATIENT)
Dept: PHYSICAL THERAPY | Facility: CLINIC | Age: 63
End: 2018-11-07
Payer: MEDICARE

## 2018-11-07 DIAGNOSIS — M25.561 ACUTE PAIN OF RIGHT KNEE: ICD-10-CM

## 2018-11-07 DIAGNOSIS — Z96.651 PRESENCE OF RIGHT ARTIFICIAL KNEE JOINT: Primary | ICD-10-CM

## 2018-11-07 DIAGNOSIS — R26.89 BALANCE PROBLEM: ICD-10-CM

## 2018-11-07 PROCEDURE — 97150 GROUP THERAPEUTIC PROCEDURES: CPT | Performed by: PHYSICAL THERAPIST

## 2018-11-07 PROCEDURE — 97140 MANUAL THERAPY 1/> REGIONS: CPT | Performed by: PHYSICAL THERAPIST

## 2018-11-07 PROCEDURE — 97110 THERAPEUTIC EXERCISES: CPT | Performed by: PHYSICAL THERAPIST

## 2018-11-07 NOTE — PROGRESS NOTES
Daily Note     Today's date: 2018  Patient name: Huey Bal  : 1955  MRN: [id number]  Referring provider: Morteza Castillo MD  Dx:   Encounter Diagnosis     ICD-10-CM    1  Presence of right artificial knee joint Z96 651    2  Acute pain of right knee M25 561    3  Balance problem R26 89                   Subjective:  Patient reports she has transitioned to using the [facility] with walking today but states her legs are tired with use the cane but overall feeling stable with walking  Patient reports her fibromyalgia has been acting up the past 2 days so the patient reports her left knee has been bothering her with the increase symptoms  Patient reports 2/10 pain levels in the right knee t/o the session         Objective: See treatment diary below    Precautions:  WBAT Right LE     Specialty Daily Treatment Diary     Manual  10/29 10/31 11/1 11/5  11/7    Bilat knee and LE stretching and mobs  15 min  15 min  15 min  15 min  15 min                                        Exercise Diary  10/29 10/31 11/1 11/5 11/7    Nu-step  10 min L3  10 min L3  10 min L4  10 min L3  10 min L4   Stand SLR all directions  2x10 Bilat   1 0#  2x10 Bilat  NW  2x10 Bilat  2x10 Bilat    Stand HR/TR  2x10 Bilat  On 4" step   2x10  On 4" step   2x10 Bilat  On 4" step   2x10 Bilat    1/4 squats   2x10   2x10     Front and lateral step-up   10x Bilat   4" step  10x Bilat   4" step  2x10 Bilat  4" step  2x10 Bilat   4" step    Step over   10x Bilat  4" step  10x Bilat   4" step  10x Bilat   4" step  10x Bilat   4" step    SLS and tandem on foam     3x15" Hold  Bilat  Tandem only  3x15" Hold   Bilat   Tandem Only    Rocker board         Monster walk        Side step and squat         High knee walk         Quad set         Heel slides with strap         Bridge with AB/AD  2x10   Bridge only  2x10   Bridge Only  2x10 Bridge only  2x10 Bridge only    Castro System         DKTC with Tball     10x5" Hold   Orange Tball  10x5" Hold   Orange Tball Modalities 10/29 10/31 11/1 11/5  11/7    CP to the right knee in supine  15 min  15 min 15 min  15 min  15 min                              Assessment: Tolerated treatment well  PT notes continuation of progression of TE program with focus on gait/balance and manual therapy to decrease pain levels and improve functional limitations to meet therapy goals  PT notes the patient with improved knee flexion but continuation of limitations with TKE with need for continuation of skilled therapy  Plan: Continue per plan of care

## 2018-11-08 ENCOUNTER — APPOINTMENT (OUTPATIENT)
Dept: PHYSICAL THERAPY | Facility: CLINIC | Age: 63
End: 2018-11-08
Payer: MEDICARE

## 2018-11-09 ENCOUNTER — OFFICE VISIT (OUTPATIENT)
Dept: PHYSICAL THERAPY | Facility: CLINIC | Age: 63
End: 2018-11-09
Payer: MEDICARE

## 2018-11-09 DIAGNOSIS — R26.89 BALANCE PROBLEM: ICD-10-CM

## 2018-11-09 DIAGNOSIS — Z96.651 PRESENCE OF RIGHT ARTIFICIAL KNEE JOINT: Primary | ICD-10-CM

## 2018-11-09 DIAGNOSIS — M25.561 ACUTE PAIN OF RIGHT KNEE: ICD-10-CM

## 2018-11-09 PROCEDURE — 97150 GROUP THERAPEUTIC PROCEDURES: CPT | Performed by: PHYSICAL THERAPIST

## 2018-11-09 PROCEDURE — 97140 MANUAL THERAPY 1/> REGIONS: CPT | Performed by: PHYSICAL THERAPIST

## 2018-11-09 PROCEDURE — 97110 THERAPEUTIC EXERCISES: CPT | Performed by: PHYSICAL THERAPIST

## 2018-11-09 NOTE — PROGRESS NOTES
Daily Note     Today's date: 2018  Patient name: Hiro Mosley  : 1955  MRN: [id number]  Referring provider: Oralia Dumas MD  Dx:   Encounter Diagnosis     ICD-10-CM    1  Presence of right artificial knee joint Z96 651    2  Acute pain of right knee M25 561    3  Balance problem R26 89                   Subjective:  Patient reports the legs are sore today from the fibromyalgia  Patient reports she wants to call MD today to talk about her pain med situation because the present pain meds are not helping her symptoms  Patient reports 7/10 pain levels in the bilateral knees at the start of the session and 2/10 pain levels post session with increase ease with walking and standing          Objective: See treatment diary below    Precautions:  WBAT Right LE     Specialty Daily Treatment Diary     Manual         Bilat knee and LE stretching and mobs  15 min                                            Exercise Diary  11/9  10/31 11/1 11/5 11/7    Nu-step  10 min L4 10 min L3  10 min L4  10 min L3  10 min L4   Stand SLR all directions NT 2x10 Bilat   1 0#  2x10 Bilat  NW  2x10 Bilat  2x10 Bilat    Stand HR/TR On 4" step   2x10 Bilat  2x10 Bilat  On 4" step   2x10  On 4" step   2x10 Bilat  On 4" step   2x10 Bilat    1/4 squats  NT 2x10   2x10     Front and lateral step-up  10x Bilat   4" step  10x Bilat   4" step  10x Bilat   4" step  2x10 Bilat  4" step  2x10 Bilat   4" step    Step over  10x Bilat   4" step  10x Bilat  4" step  10x Bilat   4" step  10x Bilat   4" step  10x Bilat   4" step    SLS and tandem on foam  3x15" Hold   Bilat   Tandem only    3x15" Hold  Bilat  Tandem only  3x15" Hold   Bilat   Tandem Only    Rocker board         Monster walk        Side step and squat         High knee walk         Quad set         Heel slides with strap         Bridge with AB/AD 10X With Tball  2x10   Bridge only  2x10   Bridge Only  2x10 Bridge only  2x10 Bridge only    SAQ         DKTC with Tball  2x10 5" Hold Orange Tball    10x5" Hold   Orange Tball  10x5" Hold   Orange Tball                                        Modalities 11/9        CP to the right knee in supine  15 min                                  Assessment: Tolerated treatment fair  PT notes modified treatment secondary to patient subjective comments but PT will continue to progress toward therapy goals and full TE session as tolerated by patient  PT feels the patient would benefit from change in prescription meds to address fibromyalgia symptoms  Plan: Continue per plan of care

## 2018-11-12 ENCOUNTER — APPOINTMENT (OUTPATIENT)
Dept: PHYSICAL THERAPY | Facility: CLINIC | Age: 63
End: 2018-11-12
Payer: MEDICARE

## 2018-11-14 ENCOUNTER — OFFICE VISIT (OUTPATIENT)
Dept: PHYSICAL THERAPY | Facility: CLINIC | Age: 63
End: 2018-11-14
Payer: MEDICARE

## 2018-11-14 DIAGNOSIS — Z96.651 PRESENCE OF RIGHT ARTIFICIAL KNEE JOINT: Primary | ICD-10-CM

## 2018-11-14 DIAGNOSIS — M25.561 ACUTE PAIN OF RIGHT KNEE: ICD-10-CM

## 2018-11-14 PROCEDURE — G8978 MOBILITY CURRENT STATUS: HCPCS | Performed by: PHYSICAL THERAPIST

## 2018-11-14 PROCEDURE — 97150 GROUP THERAPEUTIC PROCEDURES: CPT

## 2018-11-14 PROCEDURE — 97140 MANUAL THERAPY 1/> REGIONS: CPT | Performed by: PHYSICAL THERAPIST

## 2018-11-14 PROCEDURE — G8979 MOBILITY GOAL STATUS: HCPCS | Performed by: PHYSICAL THERAPIST

## 2018-11-14 PROCEDURE — 97112 NEUROMUSCULAR REEDUCATION: CPT

## 2018-11-14 PROCEDURE — 97140 MANUAL THERAPY 1/> REGIONS: CPT

## 2018-11-14 NOTE — LETTER
2018    Madhavi Sterling MD  [facility]    Patient: Gleen Schilder   YOB: 1955   Date of Visit: 2018     Encounter Diagnosis     ICD-10-CM    1  Presence of right artificial knee joint Z96 651    2  Acute pain of right knee M25 561        Dear Dr David Talbot:    Please review the attached Plan of Care from [address] [place] [address]'s recent visit  Please verify that you agree therapy should continue by signing the attached document and sending it back to our office  If you have any questions or concerns, please don't hesitate to call  Sincerely,    Marilu Jack, PT      Referring Provider:      I certify that I have read the below Plan of Care and certify the need for these services furnished under this plan of treatment while under my care  Madhavi Sterling MD  [facility]: [phone]          Daily Note     Today's date: 2018  Patient name: Gleen Schilder  : 1955  MRN: [id number]  Referring provider: Preeti Cassidy MD  Dx:   Encounter Diagnosis     ICD-10-CM    1  Presence of right artificial knee joint Z96 651    2  Acute pain of right knee M25 561        Start Time: 0825          Subjective: Patient stated that her vertigo and fibromyalgia is better but she feel like her knee is weak making her unstable        Objective: See treatment diary below    Precautions:  WBAT Right LE     Specialty Daily Treatment Diary     Manual        Bilat knee and LE stretching and mobs  15 min                                            Exercise Diary      Nu-step  10 min L4 10 min L4 10 min L4  10 min L3  10 min L4   Stand SLR all directions NT 2x10 Bilat     2x10 Bilat  NW  2x10 Bilat  2x10 Bilat    Stand HR/TR On 4" step   2x10 Bilat  2x10 Bilat  On 4" step   2x10  On 4" step   2x10 Bilat  On 4" step   2x10 Bilat    1/4 squats  NT 2x10   2x10 Front and lateral step-up  10x Bilat   4" step  10x Bilat   4" step  10x Bilat   4" step  2x10 Bilat  4" step  2x10 Bilat   4" step    Step over  10x Bilat   4" step  10x Bilat  4" step  10x Bilat   4" step  10x Bilat   4" step  10x Bilat   4" step    SLS and tandem on foam  3x15" Hold   Bilat   Tandem only  4x 15" hold bilat  Tandem only  3x15" Hold  Bilat  Tandem only  3x15" Hold   Bilat   Tandem Only    Rocker board         Monster walk        Side step and squat         High knee walk         Quad set         Heel slides with strap         Bridge with AB/AD 10X With Tball  2x10   Bridge only  2x10   Bridge Only  2x10 Bridge only  2x10 Bridge only    SAQ         DKTC with Tball  2x10 5" Hold Orange Tball  2x10 5" hold  [facility] Tball  10x5" Hold   Orange Tball  10x5" Hold   Orange Tball                                        Modalities       CP to the right knee in supine  15 min  15 min                            Assessment: Patient able to resume full program today with no significant exacerbation of sx with program  Patient required several seated therapeutic rest breaks with program to recover  Improved flexibility but continues to lack TKE  Patient would benefit from continued therapy to decrease pain and increase strength and flexibility to improve overall LOF      Plan: Continue per plan of care  Progress treatment as tolerated  PT Re-Evaluation     Today's date: 2018  Patient name: Arlene Schultz  : 1955  MRN: [id number]  Referring provider: Astrid Smith MD  Dx:   Encounter Diagnosis     ICD-10-CM    1  Presence of right artificial knee joint Z96 651    2   Acute pain of right knee M25 561        Start Time: 825          Assessment  Impairments: abnormal gait, abnormal or restricted ROM, activity intolerance, impaired balance, impaired physical strength, lacks appropriate home exercise program and pain with function    Assessment details: PT notes the patient with continuation of decrease ROM and strength t/o the right knee and LE with demonstration of antalgic gait pattern and balance s/p right TKR leading to increase swelling, pain levels and functional limitations with need for continuation of skilled therapy for 6 weeks with focus on gait/balance, posture, strengthening, manual therapy, analgesic modalities, and update/review of HEP  Understanding of Dx/Px/POC: good   Prognosis: good    Goals  ST  Initiate HEP-MET  2  Decrease pain by 25-50%-MET   3  Increase ROM by 25-50%-MET  4  Increase strength by 1-2 mm grades-MET  5  Patient ambulate with SPC or no AD-Partial MET   LT  Demonstration of improved gait pattern and balance with SPC or no AD-Partial MET   2  Decrease limitations with stairs, ADL, standing and walking-Partial MET   3  Improve ROM to 0-115+ in the right knee-Partial MET   4   DC with HEP-Progressing    Plan  Patient would benefit from: PT eval and skilled physical therapy  Planned modality interventions: cryotherapy  Planned therapy interventions: manual therapy, neuromuscular re-education, patient education, postural training, self care, strengthening, stretching, therapeutic exercise, home exercise program, graded exercise, gait training, functional ROM exercises and flexibility  Frequency: 3x week  Duration in visits: 12  Duration in weeks: 4  Treatment plan discussed with: patient  Plan details: PT notes review of POC and findings with patient who is in agreement with PT recommendations of continuaton of skilled therapy  Subjective Evaluation    History of Present Illness  Date of surgery: 10/24/2018  Mechanism of injury: surgery  Mechanism of injury: Patient reports dealing with bilateral knee pain for 20+ years from degenerative changes t/o the knee joints and fibromyalgia  Patient reports trials of skilled therapy and multiple injections in the knees with minimal change in status    Patient was found to be a candidate for joint replacement surgery  Patient underwent the right TKR surgery on 10/24/18 and was placed in rehab for 5 days  Patient recently released from acute rehab with orders for OPPT  PT notes significant PMH of depression, fibromyalgia, and anxiety  Presently the patient has attended 7 sessions of skilled therapy and feels 50% improvement since the start of therapy with overall decrease pain levels and increase strength in the right leg  Patient reports continuation of need of use of RW with ambulation secondary to weakness in the knees and feeling unsteady with walking  Patient reports she feels she needs more therapy to get stronger in the leg and get the knee moving more as well as improve walking  Patient reports her fibromyalgia is effecting her recovery and slowing the process  Pain  Current pain ratin  At best pain ratin  At worst pain ratin  Location: Right knee   Quality: sharp and throbbing  Relieving factors: ice and rest  Aggravating factors: standing, walking and stair climbing  Progression: improved      Diagnostic Tests  X-ray: abnormal  Treatments  Previous treatment: physical therapy, injection treatment and medication  Current treatment: medication and physical therapy  Discharged from (in last 30 days): inpatient hospitalization  Patient Goals  Patient goals for therapy: decreased pain, decreased edema, improved balance, increased motion, increased strength and independence with ADLs/IADLs          Objective     Palpation     Right Tenderness of the adductor longus, iliopsoas and rectus femoris       Active Range of Motion     Right Hip   Flexion: 60 degrees   Abduction: WFL    Right Knee   Flexion: 104 degrees with pain  Extension: -8 degrees with pain    Right Ankle/Foot   Normal active range of motion    Additional Active Range of Motion Details  PT notes the patient with decrease ROM and strength t/o the right knee and LE     Passive Range of Motion     Right Hip   Flexion: 67 degrees   Abduction: WFL    Right Knee   Flexion: 107 degrees with pain  Extension: -5 degrees with pain    Mobility   Patellar Mobility:     Right Knee   WFL: medial, lateral, superior and inferior    Patellar Static Positioning   Right Knee: WFL    Strength/Myotome Testing     Right Hip   Planes of Motion   Flexion: 4  Extension: 4+  Abduction: 5  Adduction: 5  External rotation: 4  Internal rotation: 4    Right Knee   Flexion: 4  Extension: 4-    Right Ankle/Foot   Dorsiflexion: 4+  Plantar flexion: 4+  Inversion: 4+  Eversion: 4+    Swelling     Left Knee Girth Measurement (cm)   Joint line: 43 cm    Right Knee Girth Measurement (cm)   Joint line: 46 cm    Ambulation   Weight-Bearing Status   Weight-Bearing Status (Right): weight-bearing as tolerated    Assistive device used: rollator walker    Observational Gait   Gait: antalgic   Decreased walking speed, stride length, right stance time, right swing time and right step length  Additional Observational Gait Details  PT notes the patient with demonstration of antalgic gait with decrease stance on the right, decrease tibial advancement on the right, decrease hip and knee flex, decrease step length and decrease shandra with rating of good with static and dynamic activities          Flowsheet Rows      Most Recent Value   PT/OT G-Codes   FOTO information reviewed  Yes   Assessment Type  Re-evaluation   G code set  Mobility: Walking & Moving Around   Mobility: Walking and Moving Around Current Status ()  CL   Mobility: Walking and Moving Around Goal Status ()  CK          Precautions:  WBAT Right LE

## 2018-11-14 NOTE — PROGRESS NOTES
Daily Note     Today's date: 2018  Patient name: Bolivar Ortiz  : 1955  MRN: [id number]  Referring provider: Jenn Sarkar MD  Dx:   Encounter Diagnosis     ICD-10-CM    1  Presence of right artificial knee joint Z96 651    2  Acute pain of right knee M25 561        Start Time: 0825          Subjective: Patient stated that her vertigo and fibromyalgia is better but she feel like her knee is weak making her unstable        Objective: See treatment diary below    Precautions:  WBAT Right LE     Specialty Daily Treatment Diary     Manual        Bilat knee and LE stretching and mobs  15 min                                            Exercise Diary      Nu-step  10 min L4 10 min L4 10 min L4  10 min L3  10 min L4   Stand SLR all directions NT 2x10 Bilat     2x10 Bilat  NW  2x10 Bilat  2x10 Bilat    Stand HR/TR On 4" step   2x10 Bilat  2x10 Bilat  On 4" step   2x10  On 4" step   2x10 Bilat  On 4" step   2x10 Bilat    1/4 squats  NT 2x10   2x10     Front and lateral step-up  10x Bilat   4" step  10x Bilat   4" step  10x Bilat   4" step  2x10 Bilat  4" step  2x10 Bilat   4" step    Step over  10x Bilat   4" step  10x Bilat  4" step  10x Bilat   4" step  10x Bilat   4" step  10x Bilat   4" step    SLS and tandem on foam  3x15" Hold   Bilat   Tandem only  4x 15" hold bilat  Tandem only  3x15" Hold  Bilat  Tandem only  3x15" Hold   Bilat   Tandem Only    Rocker board         Monster walk        Side step and squat         High knee walk         Quad set         Heel slides with strap         Bridge with AB/AD 10X With Tball  2x10   Bridge only  2x10   Bridge Only  2x10 Bridge only  2x10 Bridge only    SAQ         DKTC with Tball  2x10 5" Hold Orange Tball  2x10 5" hold  [employer] Tball  10x5" Hold   Orange Tball  10x5" Hold   Orange Tball                                        Modalities       CP to the right knee in supine  15 min  15 min Assessment: Patient able to resume full program today with no significant exacerbation of sx with program  Patient required several seated therapeutic rest breaks with program to recover  Improved flexibility but continues to lack TKE  Patient would benefit from continued therapy to decrease pain and increase strength and flexibility to improve overall LOF      Plan: Continue per plan of care  Progress treatment as tolerated

## 2018-11-14 NOTE — PROGRESS NOTES
PT Re-Evaluation     Today's date: 2018  Patient name: Arlene Schultz  : 1955  MRN: [id number]  Referring provider: Astrid Smith MD  Dx:   Encounter Diagnosis     ICD-10-CM    1  Presence of right artificial knee joint Z96 651    2  Acute pain of right knee M25 561        Start Time: 0825          Assessment  Impairments: abnormal gait, abnormal or restricted ROM, activity intolerance, impaired balance, impaired physical strength, lacks appropriate home exercise program and pain with function    Assessment details: PT notes the patient with continuation of decrease ROM and strength t/o the right knee and LE with demonstration of antalgic gait pattern and balance s/p right TKR leading to increase swelling, pain levels and functional limitations with need for continuation of skilled therapy for 6 weeks with focus on gait/balance, posture, strengthening, manual therapy, analgesic modalities, and update/review of HEP  Understanding of Dx/Px/POC: good   Prognosis: good    Goals  ST  Initiate HEP-MET  2  Decrease pain by 25-50%-MET   3  Increase ROM by 25-50%-MET  4  Increase strength by 1-2 mm grades-MET  5  Patient ambulate with SPC or no AD-Partial MET   LT  Demonstration of improved gait pattern and balance with SPC or no AD-Partial MET   2  Decrease limitations with stairs, ADL, standing and walking-Partial MET   3    Improve ROM to 0-115+ in the right knee-Partial MET   4   DC with HEP-Progressing    Plan  Patient would benefit from: PT eval and skilled physical therapy  Planned modality interventions: cryotherapy  Planned therapy interventions: manual therapy, neuromuscular re-education, patient education, postural training, self care, strengthening, stretching, therapeutic exercise, home exercise program, graded exercise, gait training, functional ROM exercises and flexibility  Frequency: 3x week  Duration in visits: 12  Duration in weeks: 4  Treatment plan discussed with: patient  Plan details: PT notes review of POC and findings with patient who is in agreement with PT recommendations of continuaton of skilled therapy  Subjective Evaluation    History of Present Illness  Date of surgery: 10/24/2018  Mechanism of injury: surgery  Mechanism of injury: Patient reports dealing with bilateral knee pain for 20+ years from degenerative changes t/o the knee joints and fibromyalgia  Patient reports trials of skilled therapy and multiple injections in the knees with minimal change in status  Patient was found to be a candidate for joint replacement surgery  Patient underwent the right TKR surgery on 10/24/18 and was placed in rehab for 5 days  Patient recently released from acute rehab with orders for OPPT  PT notes significant PMH of depression, fibromyalgia, and anxiety  Presently the patient has attended 7 sessions of skilled therapy and feels 50% improvement since the start of therapy with overall decrease pain levels and increase strength in the right leg  Patient reports continuation of need of use of RW with ambulation secondary to weakness in the knees and feeling unsteady with walking  Patient reports she feels she needs more therapy to get stronger in the leg and get the knee moving more as well as improve walking  Patient reports her fibromyalgia is effecting her recovery and slowing the process     Pain  Current pain ratin  At best pain ratin  At worst pain ratin  Location: Right knee   Quality: sharp and throbbing  Relieving factors: ice and rest  Aggravating factors: standing, walking and stair climbing  Progression: improved      Diagnostic Tests  X-ray: abnormal  Treatments  Previous treatment: physical therapy, injection treatment and medication  Current treatment: medication and physical therapy  Discharged from (in last 30 days): inpatient hospitalization  Patient Goals  Patient goals for therapy: decreased pain, decreased edema, improved balance, increased motion, increased strength and independence with ADLs/IADLs          Objective     Palpation     Right Tenderness of the adductor longus, iliopsoas and rectus femoris  Active Range of Motion     Right Hip   Flexion: 60 degrees   Abduction: WFL    Right Knee   Flexion: 104 degrees with pain  Extension: -8 degrees with pain    Right Ankle/Foot   Normal active range of motion    Additional Active Range of Motion Details  PT notes the patient with decrease ROM and strength t/o the right knee and LE     Passive Range of Motion     Right Hip   Flexion: 67 degrees   Abduction: WFL    Right Knee   Flexion: 107 degrees with pain  Extension: -5 degrees with pain    Mobility   Patellar Mobility:     Right Knee   WFL: medial, lateral, superior and inferior    Patellar Static Positioning   Right Knee: WFL    Strength/Myotome Testing     Right Hip   Planes of Motion   Flexion: 4  Extension: 4+  Abduction: 5  Adduction: 5  External rotation: 4  Internal rotation: 4    Right Knee   Flexion: 4  Extension: 4-    Right Ankle/Foot   Dorsiflexion: 4+  Plantar flexion: 4+  Inversion: 4+  Eversion: 4+    Swelling     Left Knee Girth Measurement (cm)   Joint line: 43 cm    Right Knee Girth Measurement (cm)   Joint line: 46 cm    Ambulation   Weight-Bearing Status   Weight-Bearing Status (Right): weight-bearing as tolerated    Assistive device used: rollator walker    Observational Gait   Gait: antalgic   Decreased walking speed, stride length, right stance time, right swing time and right step length  Additional Observational Gait Details  PT notes the patient with demonstration of antalgic gait with decrease stance on the right, decrease tibial advancement on the right, decrease hip and knee flex, decrease step length and decrease shandra with rating of good with static and dynamic activities          Flowsheet Rows      Most Recent Value   PT/OT G-Codes   FOTO information reviewed  Yes   Assessment Type Re-evaluation   G code set  Mobility: Walking & Moving Around   Mobility: Walking and Moving Around Current Status ()  CL   Mobility: Walking and Moving Around Goal Status ()  CK          Precautions:  WBAT Right LE

## 2018-11-15 ENCOUNTER — TRANSCRIBE ORDERS (OUTPATIENT)
Dept: PHYSICAL THERAPY | Facility: CLINIC | Age: 63
End: 2018-11-15

## 2018-11-15 DIAGNOSIS — Z96.651 PRESENCE OF RIGHT ARTIFICIAL KNEE JOINT: Primary | ICD-10-CM

## 2018-11-15 DIAGNOSIS — M25.561 ACUTE PAIN OF RIGHT KNEE: ICD-10-CM

## 2018-11-16 ENCOUNTER — APPOINTMENT (OUTPATIENT)
Dept: PHYSICAL THERAPY | Facility: CLINIC | Age: 63
End: 2018-11-16
Payer: MEDICARE

## 2018-11-19 ENCOUNTER — OFFICE VISIT (OUTPATIENT)
Dept: PHYSICAL THERAPY | Facility: CLINIC | Age: 63
End: 2018-11-19
Payer: MEDICARE

## 2018-11-19 DIAGNOSIS — Z96.651 PRESENCE OF RIGHT ARTIFICIAL KNEE JOINT: Primary | ICD-10-CM

## 2018-11-19 DIAGNOSIS — M25.561 ACUTE PAIN OF RIGHT KNEE: ICD-10-CM

## 2018-11-19 DIAGNOSIS — R26.89 BALANCE PROBLEM: ICD-10-CM

## 2018-11-19 PROCEDURE — 97110 THERAPEUTIC EXERCISES: CPT | Performed by: PHYSICAL THERAPIST

## 2018-11-19 PROCEDURE — 97112 NEUROMUSCULAR REEDUCATION: CPT | Performed by: PHYSICAL THERAPIST

## 2018-11-19 PROCEDURE — 97140 MANUAL THERAPY 1/> REGIONS: CPT | Performed by: PHYSICAL THERAPIST

## 2018-11-19 PROCEDURE — 97150 GROUP THERAPEUTIC PROCEDURES: CPT | Performed by: PHYSICAL THERAPIST

## 2018-11-19 NOTE — PROGRESS NOTES
Daily Note     Today's date: 2018  Patient name: Malcolm Yarbrough  : 1955  MRN: [id number]  Referring provider: Ramirez Doran MD  Dx:   Encounter Diagnosis     ICD-10-CM    1  Presence of right artificial knee joint Z96 651    2  Acute pain of right knee M25 561    3  Balance problem R26 89                   Subjective:  Patient reports f/u with MD this morning with removal of stitches  Patient reports she spoke with MD about recent flare-up in fibromyalgia symptoms in the left knee and LE which the patient feels is slowing her progress in therapy  MD injected the left knee with cortisone to address the c/o  Patient reports 3/10 pain levels in the right knee at the start of the session         Objective: See treatment diary below    Precautions:  WBAT Right LE     Specialty Daily Treatment Diary     Manual        Bilat knee and LE stretching and mobs  15 min  15 min  15 min                                          Exercise Diary      Nu-step  10 min L4 10 min L4 10 min L5 10 min L3  10 min L4   Stand SLR all directions NT 2x10 Bilat     2x10 Bilat  NW  2x10 Bilat  2x10 Bilat    Stand HR/TR On 4" step   2x10 Bilat  2x10 Bilat  On 4" step   2x10  On 4" step   2x10 Bilat  On 4" step   2x10 Bilat    1/4 squats  NT 2x10   2x10     Front and lateral step-up  10x Bilat   4" step  10x Bilat   4" step  10x  Bilat   4" step  2x10 Bilat  4" step  2x10 Bilat   4" step    Step over  10x Bilat   4" step  10x Bilat  4" step  10x Bilat   4" step  10x Bilat   4" step  10x Bilat   4" step    SLS and tandem on foam  3x15" Hold   Bilat   Tandem only  4x 15" hold bilat  Tandem only 4x15" Hold Bilat   Tandem only  3x15" Hold  Bilat  Tandem only  3x15" Hold   Bilat   Tandem Only    Rocker board    4x10 Feet  Light Blue      Monster walk   4x10 Feet Light Blue      Side step and squat         High knee walk         Quad set         Heel slides with strap         Bridge with AB/AD 10X With Tball  2x10   Bridge only  2x10   Bridge with Tball  2x10 Bridge only  2x10 Bridge only    SAQ         DKTC with Tball  2x10 5" Hold Orange Tball  2x10 5" hold  Orange Tball 2x10 5" Hold   Orange Tball  10x5" Hold   [place] Tball  10x5" Hold   [place] Tball                                        Modalities 11/9 11/14 11/19      CP to the right knee in supine  15 min  15 min 15 min                              Assessment: Tolerated treatment well  PT notes continuation of progression of TE program with focus on gait/balance and manual therapy to decrease pain levels and improve functional limitations to meet therapy goals  PT notes patient with continuation of limitations with minimal progression secondary to increase fibromyalgia symptoms in the left LE but PT feels the cortisone injection by the MD will assist with decreasing symptoms to allow progression toward therapy goals  Plan: Continue per plan of care

## 2018-11-21 ENCOUNTER — OFFICE VISIT (OUTPATIENT)
Dept: PHYSICAL THERAPY | Facility: CLINIC | Age: 63
End: 2018-11-21
Payer: MEDICARE

## 2018-11-21 DIAGNOSIS — M25.561 ACUTE PAIN OF RIGHT KNEE: ICD-10-CM

## 2018-11-21 DIAGNOSIS — Z96.651 PRESENCE OF RIGHT ARTIFICIAL KNEE JOINT: ICD-10-CM

## 2018-11-21 DIAGNOSIS — R26.89 BALANCE PROBLEM: Primary | ICD-10-CM

## 2018-11-21 PROCEDURE — 97140 MANUAL THERAPY 1/> REGIONS: CPT | Performed by: PHYSICAL THERAPIST

## 2018-11-21 PROCEDURE — 97110 THERAPEUTIC EXERCISES: CPT | Performed by: PHYSICAL THERAPIST

## 2018-11-21 PROCEDURE — 97150 GROUP THERAPEUTIC PROCEDURES: CPT | Performed by: PHYSICAL THERAPIST

## 2018-11-21 NOTE — PROGRESS NOTES
Daily Note     Today's date: 2018  Patient name: Jacki Liang  : 1955  MRN: [id number]  Referring provider: Juana Champion MD  Dx:   Encounter Diagnosis     ICD-10-CM    1  Balance problem R26 89    2  Acute pain of right knee M25 561    3  Presence of right artificial knee joint Z96 651                   Subjective:  Patient reports her fibromyalgia symptoms have really been acting up the past week causing increase symptoms in the UE and LE with limitations with walking and ADL  Patient reports frustration with the increase fibromyalgia symptoms but the patient reports she has been working with her physicians to address the symptoms with medications  Patient reports 5/10 pain levels at the start of the session and 4/10 pain levels post session         Objective: See treatment diary below    Precautions:  WBAT Right LE     Specialty Daily Treatment Diary     Manual      Bilat knee and LE stretching and mobs  15 min  15 min  15 min  15 min                                         Exercise Diary      Nu-step  10 min L4 10 min L4 10 min L5 10 min L5    Stand SLR all directions NT 2x10 Bilat     2x10 Bilat  NW  10x Bilat     Stand HR/TR On 4" step   2x10 Bilat  2x10 Bilat  On 4" step   2x10  NT     1/4 squats  NT 2x10   DC    Front and lateral step-up  10x Bilat   4" step  10x Bilat   4" step  10x  Bilat   4" step  NT    Step over  10x Bilat   4" step  10x Bilat  4" step  10x Bilat   4" step  NT     SLS and tandem on foam  3x15" Hold   Bilat   Tandem only  4x 15" hold bilat  Tandem only 4x15" Hold Bilat   Tandem only  4x15" Hold  Bilat  Tandem only     Rocker board    4x10 Feet  Light Blue  NT    Monster walk   4x10 Feet Light Blue  NT    Side step and squat         High knee walk         Quad set         Heel slides with strap         Bridge with AB/AD 10X With Tball  2x10   Bridge only  2x10   Bridge with Tball  2x10 Bridge only     SAQ         DKTC with Tball  2x10 5" Hold Orange Tball  2x10 5" hold  [employer]ange Tball 2x10 5" Hold   [employer]ange Tball  2x10 5" Hold   [place] Tball                                         Modalities 11/9 11/14 11/19 11/21    CP to the right knee in supine  15 min  15 min 15 min  15 min                           Assessment: Tolerated treatment fair  PT notes modified treatment secondary to increase pain levels but PT will continue to progress toward therapy goals and full TE session as tolerated by patient  PT notes PMH of fibromyalgia is contributing to increase symptoms and prolonged course of skilled therapy  PT notes the patient with questions about prescription pain medication interactions and PT recommended the patient f/u with pharmacist at regular pharmacy to address concerns secondary to outside the PT knowledge base to properly answer  Plan: Continue per plan of care

## 2018-11-23 ENCOUNTER — OFFICE VISIT (OUTPATIENT)
Dept: PHYSICAL THERAPY | Facility: CLINIC | Age: 63
End: 2018-11-23
Payer: MEDICARE

## 2018-11-23 DIAGNOSIS — R26.89 BALANCE PROBLEM: Primary | ICD-10-CM

## 2018-11-23 DIAGNOSIS — M25.561 ACUTE PAIN OF RIGHT KNEE: ICD-10-CM

## 2018-11-23 DIAGNOSIS — Z96.651 PRESENCE OF RIGHT ARTIFICIAL KNEE JOINT: ICD-10-CM

## 2018-11-23 PROCEDURE — 97110 THERAPEUTIC EXERCISES: CPT | Performed by: PHYSICAL THERAPIST

## 2018-11-23 PROCEDURE — 97150 GROUP THERAPEUTIC PROCEDURES: CPT | Performed by: PHYSICAL THERAPIST

## 2018-11-23 PROCEDURE — 97140 MANUAL THERAPY 1/> REGIONS: CPT | Performed by: PHYSICAL THERAPIST

## 2018-11-23 PROCEDURE — 97112 NEUROMUSCULAR REEDUCATION: CPT | Performed by: PHYSICAL THERAPIST

## 2018-11-23 NOTE — PROGRESS NOTES
Daily Note     Today's date: 2018  Patient name: Augie Easton  : 1955  MRN: [id number]  Referring provider: Mario Donahue MD  Dx:   Encounter Diagnosis     ICD-10-CM    1  Balance problem R26 89    2  Presence of right artificial knee joint Z96 651    3  Acute pain of right knee M25 561                   Subjective:  Patient reports the fibromyalgia continues to act up from unknown cause leading to limitations with standing and walking with bilateral knee pain  Patient reports the left knee continues to feel weak and ania from the fibromyalgia  Patient reports 3/10 pain in the right knee at the start of the session  Post session, the patient reports increase ease with walking with pain levels of 2/10          Objective: See treatment diary below    Precautions:  WBAT Right LE     Specialty Daily Treatment Diary     Manual     Bilat knee and LE stretching and mobs  15 min  15 min  15 min  15 min  15 min                                        Exercise Diary     Nu-step  10 min L4 10 min L4 10 min L5 10 min L5 10 min L5    Stand SLR all directions NT 2x10 Bilat     2x10 Bilat  NW  10x Bilat  2x10 Blilat  NW    Stand HR/TR On 4" step   2x10 Bilat  2x10 Bilat  On 4" step   2x10  NT  On 4" step   2x10    1/4 squats  NT 2x10   DC S/L Gastroc stretch on step   10x10" Hold   Bilat    Front and lateral step-up  10x Bilat   4" step  10x Bilat   4" step  10x  Bilat   4" step  NT NT   Step over  10x Bilat   4" step  10x Bilat  4" step  10x Bilat   4" step  NT  NT   SLS and tandem on foam  3x15" Hold   Bilat   Tandem only  4x 15" hold bilat  Tandem only 4x15" Hold Bilat   Tandem only  4x15" Hold  Bilat  Tandem only  4x15" Hold   Bilat   Tandem only    Side step and squat    4x10 Feet  Light Blue  NT 4x10 Feet   Light Blue    Monster walk   4x10 Feet Light Blue  NT 4x10 Feet   Light blue    Rocker board         High knee walk         Quad set Heel slides with strap         Bridge with AB/AD 10X With Tball  2x10   Bridge only  2x10   Bridge with Tball  2x10 Bridge only  2x10 Bridge with Tball    SAQ         DKTC with Tball  2x10 5" Hold Orange Tball  2x10 5" hold  Orange Tball 2x10 5" Hold   Orange Tball  2x10 5" Hold   Orange Tball  2x10 5" Hold   [employer] Tball                                        Modalities 11/9 11/14 11/19 11/21 11/23   CP to the right knee in supine  15 min  15 min 15 min  15 min  15 min                            Assessment: Tolerated treatment fair  PT notes modified treatment secondary to patient subjective comments with increase pain levels in the bilateral LE but PT will continue to progress toward therapy goals and full TE session as tolerated by patient  Plan: Continue per plan of care

## 2018-11-26 ENCOUNTER — OFFICE VISIT (OUTPATIENT)
Dept: PHYSICAL THERAPY | Facility: CLINIC | Age: 63
End: 2018-11-26
Payer: MEDICARE

## 2018-11-26 DIAGNOSIS — M25.561 ACUTE PAIN OF RIGHT KNEE: ICD-10-CM

## 2018-11-26 DIAGNOSIS — R26.89 BALANCE PROBLEM: Primary | ICD-10-CM

## 2018-11-26 DIAGNOSIS — Z96.651 PRESENCE OF RIGHT ARTIFICIAL KNEE JOINT: ICD-10-CM

## 2018-11-26 PROCEDURE — 97112 NEUROMUSCULAR REEDUCATION: CPT | Performed by: PHYSICAL THERAPIST

## 2018-11-26 PROCEDURE — 97140 MANUAL THERAPY 1/> REGIONS: CPT | Performed by: PHYSICAL THERAPIST

## 2018-11-26 PROCEDURE — 97110 THERAPEUTIC EXERCISES: CPT | Performed by: PHYSICAL THERAPIST

## 2018-11-26 PROCEDURE — 97150 GROUP THERAPEUTIC PROCEDURES: CPT | Performed by: PHYSICAL THERAPIST

## 2018-11-26 NOTE — PROGRESS NOTES
Daily Note     Today's date: 2018  Patient name: Temo Frazier  : 1955  MRN: [id number]  Referring provider: Lauren Fuchs MD  Dx:   Encounter Diagnosis     ICD-10-CM    1  Balance problem R26 89    2  Presence of right artificial knee joint Z96 651    3  Acute pain of right knee M25 561                   Subjective:  Patient reports the knee is feeling better this morning  Patient reports decrease buckling of the left knee over the weekend  Patient reports increase use of the [facility] over the weekend with decrease pain and increase strength in the bilateral knees  Patient reports 2/10 pain levels in the knees at the start of the session  Post session, the patient reports the knees are looser with pain levels of 1/10  Patient reports she has been doubling up her Tramadol at home as per MD orders and the patient feels this has been helping control her fibromyalgia symptoms          Objective: See treatment diary below    Precautions:  WBAT Right LE     Specialty Daily Treatment Diary     Manual          Bilat knee and LE stretching and mobs  15 min                                            Exercise Diary     Nu-step  10 min L5 10 min L4 10 min L5 10 min L5 10 min L5    Stand SLR all directions 2x10 Bilat  1 5#  2x10 Bilat     2x10 Bilat  NW  10x Bilat  2x10 Blilat  NW    Stand HR/TR On 4" step   2x10 Bilat  2x10 Bilat  On 4" step   2x10  NT  On 4" step   2x10    S/L Gastroc Stretch on step  10x10" Hold   Bilat  4" step  2x10   DC S/L Gastroc stretch on step   10x10" Hold   Bilat    Front and lateral step-up  2x10 Bilat   4" step  10x Bilat   4" step  10x  Bilat   4" step  NT NT   Step over  2x10 Bilat   4" step  10x Bilat  4" step  10x Bilat   4" step  NT  NT   SLS and tandem on foam  4x15" Hold   Bilat   Tandem only  4x 15" hold bilat  Tandem only 4x15" Hold Bilat   Tandem only  4x15" Hold  Bilat  Tandem only  4x15" Hold   Bilat   Tandem only    Side step and squat NT  4x10 Feet  Light Blue  NT 4x10 Feet   Light Blue    Monster walk NT  4x10 Feet Light Blue  NT 4x10 Feet   Light blue    Rocker board         High knee walk         Quad set         Heel slides with strap         Bridge with AB/AD 10X With Tball  2x10   Bridge only  2x10   Bridge with Tball  2x10 Bridge only  2x10 Bridge with Tball    SAQ         DKTC with Tball  2x10 5" Hold Orange Tball  2x10 5" hold  Orange Tball 2x10 5" Hold   Orange Tball  2x10 5" Hold   Orange Tball  2x10 5" Hold   Orange Tball                                        Modalities 11/26        CP to the right knee in supine  15 min                                Assessment: Tolerated treatment well  PT notes continuation of progression of TE program with focus on gait/balance and manual therapy to decrease pain levels and improve functional limitations to meet therapy goals  PT notes patient came into clinic with [facility] and demonstrates antalgic gait pattern but overall safe with use of SPC  Plan: Continue per plan of care

## 2018-11-28 ENCOUNTER — OFFICE VISIT (OUTPATIENT)
Dept: PHYSICAL THERAPY | Facility: CLINIC | Age: 63
End: 2018-11-28
Payer: MEDICARE

## 2018-11-28 DIAGNOSIS — R26.89 BALANCE PROBLEM: Primary | ICD-10-CM

## 2018-11-28 DIAGNOSIS — Z96.651 PRESENCE OF RIGHT ARTIFICIAL KNEE JOINT: ICD-10-CM

## 2018-11-28 DIAGNOSIS — M25.561 ACUTE PAIN OF RIGHT KNEE: ICD-10-CM

## 2018-11-28 PROCEDURE — 97140 MANUAL THERAPY 1/> REGIONS: CPT | Performed by: PHYSICAL THERAPIST

## 2018-11-28 PROCEDURE — 97112 NEUROMUSCULAR REEDUCATION: CPT | Performed by: PHYSICAL THERAPIST

## 2018-11-28 PROCEDURE — 97110 THERAPEUTIC EXERCISES: CPT | Performed by: PHYSICAL THERAPIST

## 2018-11-28 PROCEDURE — 97150 GROUP THERAPEUTIC PROCEDURES: CPT | Performed by: PHYSICAL THERAPIST

## 2018-11-28 NOTE — PROGRESS NOTES
Daily Note     Today's date: 2018  Patient name: Nael Rainey  : 1955  MRN: [id number]  Referring provider: Gilberto Malloy MD  Dx:   Encounter Diagnosis     ICD-10-CM    1  Balance problem R26 89    2  Presence of right artificial knee joint Z96 651    3  Acute pain of right knee M25 561                   Subjective:  Patient reports her fibromyalgia symptoms started acting up last night from unknown cause leading to increase pain levels in the low back and bilateral LE  Patient reports she continues with the increase pain meds as per MD doctors with minimal change in symptoms  Patient reports need for RW with ambulation secondary to increase bilateral LE pain with levels of 7/10 at the start of the session  Patient reports feeling better post session with pain levels of 2/10 in the bilateral knees         Objective: See treatment diary below    Precautions:  WBAT Right LE     Specialty Daily Treatment Diary     Manual        Bilat knee and LE stretching and mobs  15 min  15 min                                           Exercise Diary        Nu-step  10 min L5 10 min L5      Stand SLR all directions 2x10 Bilat  1 5#  2x10 Bilat          Stand HR/TR On 4" step   2x10 Bilat  10x Bilat   4" step       S/L Gastroc Stretch on step  10x10" Hold   Bilat  4" step  10x10" Hold  Bilat  4" step       Front and lateral step-up  2x10 Bilat   4" step  10x Bilat   4" step       Step over  2x10 Bilat   4" step  10x Bilat  4" step       SLS and tandem on foam  4x15" Hold   Bilat   Tandem only  5x 15" hold bilat  Tandem only  SLS 3x15" Hold       Side step and squat  NT NT 4x10 Feet  Light Blue  NT 4x10 Feet   Light Blue    Monster walk NT NT  4x10 Feet Light Blue  NT 4x10 Feet   Light blue    Rocker board         High knee walk         Quad set         Heel slides with strap         Bridge with Tball  10X With Orange Tball  2x10 with [employer]         DKTC with Tball  2x10 5" Hold Orange Tball  2x10 5" hold  Orange Tball      LTR with Tball   10x5" Hold   Orange Tball                                   Modalities 11/26 11/28      CP to the right knee in supine  15 min  15 min with   MHP to the LB and left knee in seated                             Assessment: Tolerated treatment fair  PT notes modified treatment secondary to patient subjective comments of increase fibromyalgia symptoms but PT will continue to progress toward therapy goals and full TE session as tolerated by patient  PT notes addition of MHP to the LB post session as per patient request secondary to increase LBP with fibromyalgia symptoms  Plan: Continue per plan of care

## 2018-11-30 ENCOUNTER — OFFICE VISIT (OUTPATIENT)
Dept: PHYSICAL THERAPY | Facility: CLINIC | Age: 63
End: 2018-11-30
Payer: MEDICARE

## 2018-11-30 DIAGNOSIS — R26.89 BALANCE PROBLEM: Primary | ICD-10-CM

## 2018-11-30 DIAGNOSIS — M25.561 ACUTE PAIN OF RIGHT KNEE: ICD-10-CM

## 2018-11-30 DIAGNOSIS — Z96.651 PRESENCE OF RIGHT ARTIFICIAL KNEE JOINT: ICD-10-CM

## 2018-11-30 PROCEDURE — 97140 MANUAL THERAPY 1/> REGIONS: CPT | Performed by: PHYSICAL THERAPIST

## 2018-11-30 PROCEDURE — 97150 GROUP THERAPEUTIC PROCEDURES: CPT | Performed by: PHYSICAL THERAPIST

## 2018-11-30 PROCEDURE — 97112 NEUROMUSCULAR REEDUCATION: CPT | Performed by: PHYSICAL THERAPIST

## 2018-11-30 PROCEDURE — 97110 THERAPEUTIC EXERCISES: CPT | Performed by: PHYSICAL THERAPIST

## 2018-11-30 NOTE — PROGRESS NOTES
Daily Note     Today's date: 2018  Patient name: Huey Bal  : 1955  MRN: [id number]  Referring provider: Morteza Castillo MD  Dx:   Encounter Diagnosis     ICD-10-CM    1  Balance problem R26 89    2  Presence of right artificial knee joint Z96 651    3  Acute pain of right knee M25 561                   Subjective:  Patient reports having a good past few days with overall decrease pain and increase strength in the legs  Patient reports increase use of the [facility] at home and in the community with decrease symptoms  Patient reports 1/10 pain levels in the right knee at the start of the session  Post session, the patient reports the knee feel stiff and tired with pain levels of 2/10  Patient reports she is happy she was able to progress to the 6" step for the session         Objective: See treatment diary below    Precautions:  WBAT Right LE     Specialty Daily Treatment Diary     Manual        Bilat knee and LE stretching and mobs  15 min  15 min  15 min                                          Exercise Diary       Nu-step  10 min L5 10 min L5 10 min L5      Stand SLR all directions 2x10 Bilat  1 5#  2x10 Bilat     NT     Stand HR/TR On 4" step   2x10 Bilat  10x Bilat   4" step  2x10 Bilat  4" step      S/L Gastroc Stretch on step  10x10" Hold   Bilat  4" step  10x10" Hold  Bilat  4" step  10x10" Hold   Bilat  4" step      Front and lateral step-up  2x10 Bilat   4" step  10x Bilat   4" step  10x Bilat   6" step      Step over  2x10 Bilat   4" step  10x Bilat  4" step  10x Bilat   6" step      SLS and tandem on foam  4x15" Hold   Bilat   Tandem only  5x 15" hold bilat  Tandem only  SLS 3x15" Hold  SLS  3x15" Hold  Bilat      Side step and squat  NT NT 4x10 Feet  Light Blue      Monster walk NT NT  4x10 Feet Light Blue      Rocker board         High knee walk         Quad set         Heel slides with strap         Bridge with Tball  10X With Orange Tball  2x10 with Orange Tball 2x10 with [employer]      SAQ         DKTC with Tball  2x10 5" Hold Orange Tball  2x10 5" hold  Orange Tball 2x10 5" Hold   Orange Tball      LTR with Tball   10x5" Hold   Orange Tball  NT                                  Modalities 11/26 11/28 11/30     CP to the right knee in supine  15 min  15 min with   MHP to the LB and left knee in seated  15 min with MHP to the LB and left knee in seated                              Assessment: Tolerated treatment well  PT notes continuation of progression of TE program with focus on gait/balance and manual therapy to decrease pain levels and improve functional limitations to meet therapy goals  PT notes progression to 6" step to address strength deficits to meet therapy goals  Plan: Continue per plan of care

## 2018-12-03 ENCOUNTER — OFFICE VISIT (OUTPATIENT)
Dept: PHYSICAL THERAPY | Facility: CLINIC | Age: 63
End: 2018-12-03
Payer: MEDICARE

## 2018-12-03 DIAGNOSIS — M25.561 ACUTE PAIN OF RIGHT KNEE: ICD-10-CM

## 2018-12-03 DIAGNOSIS — R26.89 BALANCE PROBLEM: Primary | ICD-10-CM

## 2018-12-03 DIAGNOSIS — Z96.651 PRESENCE OF RIGHT ARTIFICIAL KNEE JOINT: ICD-10-CM

## 2018-12-03 PROCEDURE — 97110 THERAPEUTIC EXERCISES: CPT

## 2018-12-03 PROCEDURE — 97140 MANUAL THERAPY 1/> REGIONS: CPT

## 2018-12-03 NOTE — PROGRESS NOTES
Daily Note     Today's date: 12/3/2018  Patient name: Emma Valdez  : 1955  MRN: [id number]  Referring provider: Venessa Garza MD  Dx:   Encounter Diagnosis     ICD-10-CM    1  Balance problem R26 89    2  Presence of right artificial knee joint Z96 651    3  Acute pain of right knee M25 561        Start Time: 0800          Subjective: Patient reported getting around well with cane at home  Patient stated that her fibromyalgia is most of her pain  Pain from surgery is about an  5/10  Patient stated she went to massage parlor to help with the muscle spasms in neck and shoulder due to causing tingling in arms                           Pain in and out  2/10 right knee      Objective: See treatment diary below    Precautions:  WBAT Right LE     Specialty Daily Treatment Diary     Manual  11/26  11/28 11/30  12/3    Bilat knee and LE stretching and mobs  15 min  15 min  15 min  15 min                                        Exercise Diary  11/26 11/28 11/30 12/3    Nu-step  10 min L5 10 min L5 10 min L5  10 min L5    Stand SLR all directions 2x10 Bilat  1 5#  2x10 Bilat     NT 2x10    Stand HR/TR On 4" step   2x10 Bilat  10x Bilat   4" step  2x10 Bilat  4" step  2x10 bilat  4" step    S/L Gastroc Stretch on step  10x10" Hold   Bilat  4" step  10x10" Hold  Bilat  4" step  10x10" Hold   Bilat  4" step  10x 10" hold  bilat   4" step    Front and lateral step-up  2x10 Bilat   4" step  10x Bilat   4" step  10x Bilat   6" step  10x  6" step    Step over  2x10 Bilat   4" step  10x Bilat  4" step  10x Bilat   6" step  10x bilat  6" step    SLS and tandem on foam  4x15" Hold   Bilat   Tandem only  5x 15" hold bilat  Tandem only  SLS 3x15" Hold  SLS  3x15" Hold  Bilat  Tandem  3x15" hold   bilat    Side step and squat  NT NT 4x10 Feet  Light Blue  4x 10 feet  Light blue     Monster walk NT NT  4x10 Feet Light Blue  4x10 feet  Light blue     Rocker board         High knee walk         Quad set         Heel slides with strap         Bridge with Tball  10X With Orange Tball  2x10 with Orange Tball 2x10 with Orange Tball  2x10 with   [facility]         DKTC with Tball  2x10 5" Hold Orange Tball  2x10 5" hold  Orange Tball 2x10 5" Hold   Orange Tball  2x10 5" hold  Orange Tball    LTR with Tball   10x5" Hold   Orange Tball  NT                                  Modalities 11/26  11/28 11/30 12/3    CP to the right knee in supine  15 min  15 min with   MHP to the LB and left knee in seated  15 min with MHP to the LB and left knee in seated  15 min with MHP to the LB and left knee in seated                           Assessment: Patient able to resume standing SLR today with no weight with good tolerance  No exacerbation of sx with program  Patient seemed to benefit from tibial mob pre hamstring stretch on right  Will continue to monitor fibromyalgia and progress as able  Plan: Continue per plan of care  Progress treatment as tolerated

## 2018-12-05 ENCOUNTER — OFFICE VISIT (OUTPATIENT)
Dept: PHYSICAL THERAPY | Facility: CLINIC | Age: 63
End: 2018-12-05
Payer: MEDICARE

## 2018-12-05 DIAGNOSIS — M25.561 ACUTE PAIN OF RIGHT KNEE: ICD-10-CM

## 2018-12-05 DIAGNOSIS — R26.89 BALANCE PROBLEM: Primary | ICD-10-CM

## 2018-12-05 DIAGNOSIS — Z96.651 PRESENCE OF RIGHT ARTIFICIAL KNEE JOINT: ICD-10-CM

## 2018-12-05 PROCEDURE — 97140 MANUAL THERAPY 1/> REGIONS: CPT

## 2018-12-05 PROCEDURE — 97110 THERAPEUTIC EXERCISES: CPT

## 2018-12-05 NOTE — PROGRESS NOTES
Daily Note     Today's date: 2018  Patient name: Waqas Springer  : 1955  MRN: [id number]  Referring provider: Max Mahmood MD  Dx:   Encounter Diagnosis     ICD-10-CM    1  Balance problem R26 89    2  Presence of right artificial knee joint Z96 651    3   Acute pain of right knee M25 561        Start Time: 0805          Subjective: patient stated she woke up very stiff but used some oils and heat to loosen herself up      Objective: See treatment diary below    Precautions:  WBAT Right LE     Specialty Daily Treatment Diary     Manual  11/26  11/28 11/30  12/3 12/5   Bilat knee and LE stretching and mobs  15 min  15 min  15 min  15 min 15 min                                        Exercise Diary  11/26 11/28 11/30 12/3 12/5   Nu-step  10 min L5 10 min L5 10 min L5  10 min L5 10 min L5   Stand SLR all directions 2x10 Bilat  1 5#  2x10 Bilat     NT 2x10 2x10  1 5#   Stand HR/TR On 4" step   2x10 Bilat  10x Bilat   4" step  2x10 Bilat  4" step  2x10 bilat  4" step 2x10 bilat  4" step     S/L Gastroc Stretch on step  10x10" Hold   Bilat  4" step  10x10" Hold  Bilat  4" step  10x10" Hold   Bilat  4" step  10x 10" hold  bilat   4" step 10x 10" hold  bilat  4" step   Front and lateral step-up  2x10 Bilat   4" step  10x Bilat   4" step  10x Bilat   6" step  10x  6" step x10  6" step   Step over  2x10 Bilat   4" step  10x Bilat  4" step  10x Bilat   6" step  10x bilat  6" step 2x10 bilat  6" step   SLS and tandem on foam  4x15" Hold   Bilat   Tandem only  5x 15" hold bilat  Tandem only  SLS 3x15" Hold  SLS  3x15" Hold  Bilat  Tandem  3x15" hold   bilat Tandem   5x 15" hold   bilat    Side step and squat  NT NT 4x10 Feet  Light Blue  4x 10 feet  Light blue     Monster walk NT NT  4x10 Feet Light Blue  4x10 feet  Light blue  4x 10 feet  Light blue    Rocker board         High knee walk         Quad set         Heel slides with strap         Bridge with Tball  10X With Orange Tball  2x10 with Orange Tball 2x10 with Orange Tball  2x10 with   Orange Tball 2x10 with   [facility]         DKTC with Tball  2x10 5" Hold Orange Tball  2x10 5" hold  Orange Tball 2x10 5" Hold   Orange Tball  2x10 5" hold  Orange Tball 2x10 5" hold  Orange Tball   LTR with Tball   10x5" Hold   Orange Tball  NT                                  Modalities 11/26  11/28 11/30 12/3 12/5   CP to the right knee in supine  15 min  15 min with   MHP to the LB and left knee in seated  15 min with MHP to the LB and left knee in seated  15 min with MHP to the LB and left knee in seated  15 min with MHP to LB and left knee  In seated                          Assessment: patient able to resume 1 5# ankle wt with good tolerance  Patient able to progress with repetitions and resistance with various exercises  Good tolerance with progression with minimal cues required  Patient continues to present with deficits with strength and ROM requiring continued skilled physical therapy      Plan: Continue per plan of care  Progress treatment as tolerated

## 2018-12-07 ENCOUNTER — EVALUATION (OUTPATIENT)
Dept: PHYSICAL THERAPY | Facility: CLINIC | Age: 63
End: 2018-12-07
Payer: MEDICARE

## 2018-12-07 DIAGNOSIS — M25.561 ACUTE PAIN OF RIGHT KNEE: ICD-10-CM

## 2018-12-07 DIAGNOSIS — Z96.651 PRESENCE OF RIGHT ARTIFICIAL KNEE JOINT: ICD-10-CM

## 2018-12-07 DIAGNOSIS — R26.89 BALANCE PROBLEM: Primary | ICD-10-CM

## 2018-12-07 PROCEDURE — G8979 MOBILITY GOAL STATUS: HCPCS | Performed by: PHYSICAL THERAPIST

## 2018-12-07 PROCEDURE — G8978 MOBILITY CURRENT STATUS: HCPCS | Performed by: PHYSICAL THERAPIST

## 2018-12-07 PROCEDURE — 97140 MANUAL THERAPY 1/> REGIONS: CPT

## 2018-12-07 PROCEDURE — 97110 THERAPEUTIC EXERCISES: CPT

## 2018-12-07 PROCEDURE — 97140 MANUAL THERAPY 1/> REGIONS: CPT | Performed by: PHYSICAL THERAPIST

## 2018-12-07 NOTE — LETTER
2018    MD Kerry Guevara [address] [facility]    Patient: Jacki Liang   YOB: 1955   Date of Visit: 2018     Encounter Diagnosis     ICD-10-CM    1  Balance problem R26 89    2  Presence of right artificial knee joint Z96 651    3  Acute pain of right knee M25 561        Dear Dr Gayle Dutton:    Please review the attached Plan of Care from [address] [place] [address]'s recent visit  Please verify that you agree therapy should continue by signing the attached document and sending it back to our office  If you have any questions or concerns, please don't hesitate to call  Sincerely,    Anayeli Conteh PT      Referring Provider:      I certify that I have read the below Plan of Care and certify the need for these services furnished under this plan of treatment while under my care  MD Kerry Guevara [facility] [address]: [phone]          Daily Note     Today's date: 2018  Patient name: Jacki Liang  : 1955  MRN: [id number]  Referring provider: Juana Champion MD  Dx:   Encounter Diagnosis     ICD-10-CM    1  Balance problem R26 89    2  Presence of right artificial knee joint Z96 651    3  Acute pain of right knee M25 561        Start Time: 0755          Subjective: patient stated that her legs are stiff but it was form prolonged sitting not from the workout   She also said that she felt better with the MHP than CP on knee      Objective: See treatment diary below    Precautions:  WBAT Right LE     Specialty Daily Treatment Diary     Manual  12/7 11/28 11/30  12/3 12/5   Bilat knee and LE stretching and mobs  15 min  15 min  15 min  15 min 15 min                                        Exercise Diary  12/7 11/28 11/30 12/3 12/5   Nu-step  10 min L5 10 min L5 10 min L5  10 min L5 10 min L5   Stand SLR all directions 2x10 Bilat  1 5#  2x10 Bilat     NT 2x10 2x10  1 5#   Stand HR/TR On 4" step   2x10 Bilat  10x Bilat   4" step  2x10 Bilat  4" step  2x10 bilat  4" step 2x10 bilat  4" step     S/L Gastroc Stretch on step  10x10" Hold   Bilat  4" step  10x10" Hold  Bilat  4" step  10x10" Hold   Bilat  4" step  10x 10" hold  bilat   4" step 10x 10" hold  bilat  4" step   Front and lateral step-up  2x10 Bilat   6" step  10x Bilat   4" step  10x Bilat   6" step  10x  6" step x10  6" step   Step over  2x10 Bilat   6" step  10x Bilat  4" step  10x Bilat   6" step  10x bilat  6" step 2x10 bilat  6" step   SLS and tandem on foam  4x15" Hold   Bilat   Tandem only  5x 15" hold bilat  Tandem only  SLS 3x15" Hold  SLS  3x15" Hold  Bilat  Tandem  3x15" hold   bilat Tandem   5x 15" hold   bilat    Side step and squat   NT 4x10 Feet  Light Blue  4x 10 feet  Light blue     Monster walk 4x 10 feet  Light blue  NT  4x10 Feet Light Blue  4x10 feet  Light blue  4x 10 feet  Light blue    Rocker board         High knee walk         Quad set         Heel slides with strap         Bridge with Tball  10X With Orange Tball  2x10 with Orange Tball 2x10 with Orange Tball  2x10 with   Orange Tball 2x10 with   [employer]         DKTC with Tball  2x10 5" Hold Orange Tball  2x10 5" hold  Orange Tball 2x10 5" Hold   Orange Tball  2x10 5" hold  Orange Tball 2x10 5" hold  Orange Tball   LTR with Tball   10x5" Hold   Orange Tball  NT                                  Modalities 12/7 11/28 11/30 12/3 12/5   MHP to Low back and bilateral knees  15 min   Used CP to R knee today 15 min with   MHP to the LB and left knee in seated  15 min with MHP to the LB and left knee in seated  15 min with MHP to the LB and left knee in seated  15 min with MHP to LB and left knee  In seated                          Assessment: Tolerated treatment well  Patient demonstrated fatigue post treatment, exhibited good technique with therapeutic exercises and would benefit from continued PT No progression with program today as not to exacerbate sx   Will continue with MHP to right knee based on positive response last visit  Plan: Continue per plan of care  Progress treatment as tolerated  PT Re-Evaluation     Today's date: 2018  Patient name: Sharla Mckeon  : 1955  MRN: [id number]  Referring provider: Johann Landa MD  Dx:   Encounter Diagnosis     ICD-10-CM    1  Balance problem R26 89    2  Presence of right artificial knee joint Z96 651    3  Acute pain of right knee M25 561        Start Time: 0755  Stop Time: 0945  Total time in clinic (min): 110 minutes    Assessment  Assessment details: PT notes the patient with improved ROM and strength t/o the right knee and LE with demonstration of antalgic gait pattern and balance s/p right TKR with continuation of right quad weakness leading to increase swelling, pain levels and functional limitations with need for continuation of skilled therapy for 4 weeks with focus on gait/balance, posture, strengthening, manual therapy, analgesic modalities, and update/review of HEP  Impairments: abnormal gait, abnormal or restricted ROM, activity intolerance, impaired balance, impaired physical strength, lacks appropriate home exercise program and pain with function  Understanding of Dx/Px/POC: good   Prognosis: good    Goals  ST  Initiate HEP-MET  2  Decrease pain by 25-50%-MET   3  Increase ROM by 25-50%-MET  4  Increase strength by 1-2 mm grades-MET  5  Patient ambulate with SPC or no AD-MET   LT  Demonstration of improved gait pattern and balance with SPC or no AD-Partial MET   2  Decrease limitations with stairs, ADL, standing and walking-Partial MET   3  Improve ROM to 0-115+ in the right knee-Partial MET   4   DC with HEP-Progressing    Plan  Plan details: PT notes review of POC and findings with patient who is in agreement with PT recommendations of continuaton of skilled therapy     Patient would benefit from: PT eval and skilled physical therapy  Planned modality interventions: cryotherapy  Planned therapy interventions: manual therapy, neuromuscular re-education, patient education, postural training, self care, strengthening, stretching, therapeutic exercise, home exercise program, graded exercise, gait training, functional ROM exercises and flexibility  Frequency: 3x week  Duration in visits: 12  Duration in weeks: 4  Treatment plan discussed with: patient        Subjective Evaluation    History of Present Illness  Date of surgery: 10/24/2018  Mechanism of injury: surgery  Mechanism of injury: Patient reports dealing with bilateral knee pain for 20+ years from degenerative changes t/o the knee joints and fibromyalgia  Patient reports trials of skilled therapy and multiple injections in the knees with minimal change in status  Patient was found to be a candidate for joint replacement surgery  Patient underwent the right TKR surgery on 10/24/18 and was placed in rehab for 5 days  Patient recently released from acute rehab with orders for OPPT  PT notes significant PMH of depression, fibromyalgia, and anxiety  Presently the patient has attended 16 sessions of skilled therapy and feels 70% improvement since the start of therapy with overall decrease intensity and duration pain levels and increase strength in the right leg  Patient reports progression to [facility] with ambulation secondary to weakness in the knees and feeling unsteady with walking  Patient reports she feels she needs more therapy to get stronger in the leg and get the knee moving more as well as improve walking  Patient reports her fibromyalgia is effecting her recovery and slowing the process     Pain  Current pain rating: 3  At best pain ratin  At worst pain ratin  Location: Right knee   Quality: sharp and throbbing  Relieving factors: ice and rest  Aggravating factors: standing, walking and stair climbing  Progression: improved      Diagnostic Tests  X-ray: abnormal  Treatments  Previous treatment: physical therapy, injection treatment and medication  Current treatment: medication and physical therapy  Discharged from (in last 30 days): inpatient hospitalization  Patient Goals  Patient goals for therapy: decreased pain, decreased edema, improved balance, increased motion, increased strength and independence with ADLs/IADLs          Objective     Palpation     Right Tenderness of the adductor longus, iliopsoas and rectus femoris  Active Range of Motion     Right Hip   Flexion: 75 degrees   Abduction: WFL    Right Knee   Flexion: 1047 degrees with pain  Extension: -7 degrees with pain    Right Ankle/Foot   Normal active range of motion    Additional Active Range of Motion Details  PT notes the patient with improved ROM and strength t/o the right knee and LE but continuation of right quad weakness     Passive Range of Motion     Right Hip   Flexion: 77 degrees   Abduction: WFL    Right Knee   Flexion: 110 degrees with pain  Extension: -3 degrees with pain    Mobility   Patellar Mobility:     Right Knee   WFL: medial, lateral, superior and inferior    Patellar Static Positioning   Right Knee: WFL    Strength/Myotome Testing     Right Hip   Planes of Motion   Flexion: 4  Extension: 5  Abduction: 5  Adduction: 5  External rotation: 4+  Internal rotation: 4    Right Knee   Flexion: 4+  Extension: 4    Right Ankle/Foot   Dorsiflexion: 4+  Plantar flexion: 5  Inversion: 5  Eversion: 4+    Swelling     Left Knee Girth Measurement (cm)   Joint line: 43 cm    Right Knee Girth Measurement (cm)   Joint line: 45 cm    Ambulation   Weight-Bearing Status   Weight-Bearing Status (Right): weight-bearing as tolerated    Assistive device used: single point cane    Observational Gait   Gait: antalgic   Decreased walking speed, stride length, right stance time, right swing time and right step length       Additional Observational Gait Details  PT notes the patient with demonstration of antalgic gait with decrease stance on the right, decrease tibial advancement on the right, decrease hip and knee flex, decrease step length and decrease shandra with rating of good with static and dynamic activities          Flowsheet Rows      Most Recent Value   PT/OT G-Codes   FOTO information reviewed  Yes   Assessment Type  Re-evaluation   G code set  Mobility: Walking & Moving Around   Mobility: Walking and Moving Around Current Status ()  CK   Mobility: Walking and Moving Around Goal Status ()  CJ          Precautions:  WBAT Right LE

## 2018-12-07 NOTE — PROGRESS NOTES
Daily Note     Today's date: 2018  Patient name: Glo Larose  : 1955  MRN: [id number]  Referring provider: Becky Shields MD  Dx:   Encounter Diagnosis     ICD-10-CM    1  Balance problem R26 89    2  Presence of right artificial knee joint Z96 651    3  Acute pain of right knee M25 561        Start Time: 0755          Subjective: patient stated that her legs are stiff but it was form prolonged sitting not from the workout   She also said that she felt better with the MHP than CP on knee      Objective: See treatment diary below    Precautions:  WBAT Right LE     Specialty Daily Treatment Diary     Manual  12/7 11/28 11/30  12/3 12/5   Bilat knee and LE stretching and mobs  15 min  15 min  15 min  15 min 15 min                                        Exercise Diary  12/7 11/28 11/30 12/3 12/5   Nu-step  10 min L5 10 min L5 10 min L5  10 min L5 10 min L5   Stand SLR all directions 2x10 Bilat  1 5#  2x10 Bilat     NT 2x10 2x10  1 5#   Stand HR/TR On 4" step   2x10 Bilat  10x Bilat   4" step  2x10 Bilat  4" step  2x10 bilat  4" step 2x10 bilat  4" step     S/L Gastroc Stretch on step  10x10" Hold   Bilat  4" step  10x10" Hold  Bilat  4" step  10x10" Hold   Bilat  4" step  10x 10" hold  bilat   4" step 10x 10" hold  bilat  4" step   Front and lateral step-up  2x10 Bilat   6" step  10x Bilat   4" step  10x Bilat   6" step  10x  6" step x10  6" step   Step over  2x10 Bilat   6" step  10x Bilat  4" step  10x Bilat   6" step  10x bilat  6" step 2x10 bilat  6" step   SLS and tandem on foam  4x15" Hold   Bilat   Tandem only  5x 15" hold bilat  Tandem only  SLS 3x15" Hold  SLS  3x15" Hold  Bilat  Tandem  3x15" hold   bilat Tandem   5x 15" hold   bilat    Side step and squat   NT 4x10 Feet  Light Blue  4x 10 feet  Light blue     Monster walk 4x 10 feet  Light blue  NT  4x10 Feet Light Blue  4x10 feet  Light blue  4x 10 feet  Light blue    Rocker board         High knee walk         Quad set         Heel slides with strap         Bridge with Tball  10X With Orange Tball  2x10 with Orange Tball 2x10 with Orange Tball  2x10 with   Orange Tball 2x10 with   [employer]         DKTC with Tball  2x10 5" Hold Orange Tball  2x10 5" hold  Orange Tball 2x10 5" Hold   Orange Tball  2x10 5" hold  Orange Tball 2x10 5" hold  Orange Tball   LTR with Tball   10x5" Hold   Orange Tball  NT                                  Modalities 12/7 11/28 11/30 12/3 12/5   MHP to Low back and bilateral knees  15 min   Used CP to R knee today 15 min with   MHP to the LB and left knee in seated  15 min with MHP to the LB and left knee in seated  15 min with MHP to the LB and left knee in seated  15 min with MHP to LB and left knee  In seated                          Assessment: Tolerated treatment well  Patient demonstrated fatigue post treatment, exhibited good technique with therapeutic exercises and would benefit from continued PT No progression with program today as not to exacerbate sx  Will continue with MHP to right knee based on positive response last visit  Plan: Continue per plan of care  Progress treatment as tolerated

## 2018-12-07 NOTE — PROGRESS NOTES
PT Re-Evaluation     Today's date: 2018  Patient name: Carlos Lino  : 1955  MRN: [id number]  Referring provider: Porsha Lujan MD  Dx:   Encounter Diagnosis     ICD-10-CM    1  Balance problem R26 89    2  Presence of right artificial knee joint Z96 651    3  Acute pain of right knee M25 561        Start Time: 0755  Stop Time: 0945  Total time in clinic (min): 110 minutes    Assessment  Assessment details: PT notes the patient with improved ROM and strength t/o the right knee and LE with demonstration of antalgic gait pattern and balance s/p right TKR with continuation of right quad weakness leading to increase swelling, pain levels and functional limitations with need for continuation of skilled therapy for 4 weeks with focus on gait/balance, posture, strengthening, manual therapy, analgesic modalities, and update/review of HEP  Impairments: abnormal gait, abnormal or restricted ROM, activity intolerance, impaired balance, impaired physical strength, lacks appropriate home exercise program and pain with function  Understanding of Dx/Px/POC: good   Prognosis: good    Goals  ST  Initiate HEP-MET  2  Decrease pain by 25-50%-MET   3  Increase ROM by 25-50%-MET  4  Increase strength by 1-2 mm grades-MET  5  Patient ambulate with SPC or no AD-MET   LT  Demonstration of improved gait pattern and balance with SPC or no AD-Partial MET   2  Decrease limitations with stairs, ADL, standing and walking-Partial MET   3  Improve ROM to 0-115+ in the right knee-Partial MET   4   DC with HEP-Progressing    Plan  Plan details: PT notes review of POC and findings with patient who is in agreement with PT recommendations of continuaton of skilled therapy     Patient would benefit from: PT eval and skilled physical therapy  Planned modality interventions: cryotherapy  Planned therapy interventions: manual therapy, neuromuscular re-education, patient education, postural training, self care, strengthening, stretching, therapeutic exercise, home exercise program, graded exercise, gait training, functional ROM exercises and flexibility  Frequency: 3x week  Duration in visits: 12  Duration in weeks: 4  Treatment plan discussed with: patient        Subjective Evaluation    History of Present Illness  Date of surgery: 10/24/2018  Mechanism of injury: surgery  Mechanism of injury: Patient reports dealing with bilateral knee pain for 20+ years from degenerative changes t/o the knee joints and fibromyalgia  Patient reports trials of skilled therapy and multiple injections in the knees with minimal change in status  Patient was found to be a candidate for joint replacement surgery  Patient underwent the right TKR surgery on 10/24/18 and was placed in rehab for 5 days  Patient recently released from acute rehab with orders for OPPT  PT notes significant PMH of depression, fibromyalgia, and anxiety  Presently the patient has attended 16 sessions of skilled therapy and feels 70% improvement since the start of therapy with overall decrease intensity and duration pain levels and increase strength in the right leg  Patient reports progression to [facility] with ambulation secondary to weakness in the knees and feeling unsteady with walking  Patient reports she feels she needs more therapy to get stronger in the leg and get the knee moving more as well as improve walking  Patient reports her fibromyalgia is effecting her recovery and slowing the process     Pain  Current pain rating: 3  At best pain ratin  At worst pain ratin  Location: Right knee   Quality: sharp and throbbing  Relieving factors: ice and rest  Aggravating factors: standing, walking and stair climbing  Progression: improved      Diagnostic Tests  X-ray: abnormal  Treatments  Previous treatment: physical therapy, injection treatment and medication  Current treatment: medication and physical therapy  Discharged from (in last 30 days): inpatient hospitalization  Patient Goals  Patient goals for therapy: decreased pain, decreased edema, improved balance, increased motion, increased strength and independence with ADLs/IADLs          Objective     Palpation     Right Tenderness of the adductor longus, iliopsoas and rectus femoris  Active Range of Motion     Right Hip   Flexion: 75 degrees   Abduction: WFL    Right Knee   Flexion: 1047 degrees with pain  Extension: -7 degrees with pain    Right Ankle/Foot   Normal active range of motion    Additional Active Range of Motion Details  PT notes the patient with improved ROM and strength t/o the right knee and LE but continuation of right quad weakness     Passive Range of Motion     Right Hip   Flexion: 77 degrees   Abduction: WFL    Right Knee   Flexion: 110 degrees with pain  Extension: -3 degrees with pain    Mobility   Patellar Mobility:     Right Knee   WFL: medial, lateral, superior and inferior    Patellar Static Positioning   Right Knee: WFL    Strength/Myotome Testing     Right Hip   Planes of Motion   Flexion: 4  Extension: 5  Abduction: 5  Adduction: 5  External rotation: 4+  Internal rotation: 4    Right Knee   Flexion: 4+  Extension: 4    Right Ankle/Foot   Dorsiflexion: 4+  Plantar flexion: 5  Inversion: 5  Eversion: 4+    Swelling     Left Knee Girth Measurement (cm)   Joint line: 43 cm    Right Knee Girth Measurement (cm)   Joint line: 45 cm    Ambulation   Weight-Bearing Status   Weight-Bearing Status (Right): weight-bearing as tolerated    Assistive device used: single point cane    Observational Gait   Gait: antalgic   Decreased walking speed, stride length, right stance time, right swing time and right step length       Additional Observational Gait Details  PT notes the patient with demonstration of antalgic gait with decrease stance on the right, decrease tibial advancement on the right, decrease hip and knee flex, decrease step length and decrease shandra with rating of good with static and dynamic activities          Flowsheet Rows      Most Recent Value   PT/OT G-Codes   FOTO information reviewed  Yes   Assessment Type  Re-evaluation   G code set  Mobility: Walking & Moving Around   Mobility: Walking and Moving Around Current Status ()  CK   Mobility: Walking and Moving Around Goal Status ()  CJ          Precautions:  WBAT Right LE

## 2018-12-10 ENCOUNTER — APPOINTMENT (OUTPATIENT)
Dept: PHYSICAL THERAPY | Facility: CLINIC | Age: 63
End: 2018-12-10
Payer: MEDICARE

## 2018-12-12 ENCOUNTER — OFFICE VISIT (OUTPATIENT)
Dept: PHYSICAL THERAPY | Facility: CLINIC | Age: 63
End: 2018-12-12
Payer: MEDICARE

## 2018-12-12 DIAGNOSIS — M25.561 ACUTE PAIN OF RIGHT KNEE: ICD-10-CM

## 2018-12-12 DIAGNOSIS — Z96.651 PRESENCE OF RIGHT ARTIFICIAL KNEE JOINT: ICD-10-CM

## 2018-12-12 DIAGNOSIS — R26.89 BALANCE PROBLEM: Primary | ICD-10-CM

## 2018-12-12 PROCEDURE — 97110 THERAPEUTIC EXERCISES: CPT | Performed by: PHYSICAL THERAPIST

## 2018-12-12 PROCEDURE — 97112 NEUROMUSCULAR REEDUCATION: CPT | Performed by: PHYSICAL THERAPIST

## 2018-12-12 PROCEDURE — 97140 MANUAL THERAPY 1/> REGIONS: CPT | Performed by: PHYSICAL THERAPIST

## 2018-12-12 PROCEDURE — 97150 GROUP THERAPEUTIC PROCEDURES: CPT | Performed by: PHYSICAL THERAPIST

## 2018-12-12 NOTE — PROGRESS NOTES
Daily Note     Today's date: 2018  Patient name: Carlos Lino  : 1955  MRN: [id number]  Referring provider: Porsha uLjan MD  Dx:   Encounter Diagnosis     ICD-10-CM    1  Balance problem R26 89    2  Presence of right artificial knee joint Z96 651    3  Acute pain of right knee M25 561                   Subjective:  Patient reports the knee doesn't feel too bad and walking is getting easier with the fibro symptoms being down  Patient reports that is going to start driving over the weekend but is a little nervous getting behind the wheel  Patient reports 1/10 soreness in the right knee at the start of the session and increase soreness post session with levels of 2/10        Objective: See treatment diary below    Precautions:  WBAT Right LE     Specialty Daily Treatment Diary     Manual        Bilat knee and LE stretching and mobs  15 min  15 min                                           Exercise Diary  12/7 12/12 11/30 12/3 12/5   Nu-step  10 min L5 10 min L5 10 min L5  10 min L5 10 min L5   Stand SLR all directions 2x10 Bilat  1 5#  2x10 Bilat   1 5#    NT 2x10 2x10  1 5#   Stand HR/TR On 4" step   2x10 Bilat  2x10 Bilat   4" step  2x10 Bilat  4" step  2x10 bilat  4" step 2x10 bilat  4" step     S/L Gastroc Stretch on step  10x10" Hold   Bilat  4" step  10x10" Hold  Bilat  4" step  10x10" Hold   Bilat  4" step  10x 10" hold  bilat   4" step 10x 10" hold  bilat  4" step   Front and lateral step-up  2x10 Bilat   6" step  2x10 Bilat   6" step  10x Bilat   6" step  10x  6" step x10  6" step   Step over  2x10 Bilat   6" step  2x10 Bilat  6" step  10x Bilat   6" step  10x bilat  6" step 2x10 bilat  6" step   SLS and tandem on foam  4x15" Hold   Bilat   Tandem only  SLS 3x15" Hold  SLS  3x15" Hold  Bilat  Tandem  3x15" hold   bilat Tandem   5x 15" hold   bilat    Side step and squat   4x10 Feet   Light Blue  4x10 Feet  Light Blue  4x 10 feet  Light blue     Monster walk 4x 10 feet  Light blue 4x10 Feet  Light Blue  4x10 Feet Light Blue  4x10 feet  Light blue  4x 10 feet  Light blue    Rocker board         High knee walk   4x10 Feet   2 0#       Quad set         Heel slides with strap         Bridge with Tball  10X With Orange Tball  2x10 with Orange Tball 2x10 with Orange Tball  2x10 with   Orange Tball 2x10 with   [employer]         DKTC with Tball  2x10 5" Hold Orange Tball  2x10 5" hold  Orange Tball 2x10 5" Hold   Orange Tball  2x10 5" hold  Orange Tball 2x10 5" hold  Orange Tball   LTR with Tball   10x5" Hold   Orange Tball  NT                                  Modalities 12/7 12/12      MHP to Low back and bilateral knees  15 min   Used CP to R knee today 15 min with   MHP to the LB and left knee in seated                               Assessment: Tolerated treatment well  PT notes continuation of TE program with focus gait/balance and manual therapy to decrease pain levels and improve functional limitations to meet therapy goals  PT notes patient with improved ROM t/o the right knee but continuation of decrease strength t/o the right knee and LE with demonstration of impaired gait pattern and balance with need for continuation of skilled therapy  Plan: Continue per plan of care

## 2018-12-13 ENCOUNTER — TRANSCRIBE ORDERS (OUTPATIENT)
Dept: PHYSICAL THERAPY | Facility: CLINIC | Age: 63
End: 2018-12-13

## 2018-12-13 DIAGNOSIS — M25.561 ACUTE PAIN OF RIGHT KNEE: ICD-10-CM

## 2018-12-13 DIAGNOSIS — R26.89 BALANCE PROBLEM: Primary | ICD-10-CM

## 2018-12-13 DIAGNOSIS — Z96.651 PRESENCE OF RIGHT ARTIFICIAL KNEE JOINT: ICD-10-CM

## 2018-12-14 ENCOUNTER — APPOINTMENT (OUTPATIENT)
Dept: PHYSICAL THERAPY | Facility: CLINIC | Age: 63
End: 2018-12-14
Payer: MEDICARE

## 2018-12-16 ENCOUNTER — HOSPITAL ENCOUNTER (EMERGENCY)
Facility: HOSPITAL | Age: 63
Discharge: HOME | End: 2018-12-16
Attending: EMERGENCY MEDICINE | Admitting: EMERGENCY MEDICINE
Payer: MEDICARE

## 2018-12-16 ENCOUNTER — APPOINTMENT (EMERGENCY)
Dept: CT IMAGING | Facility: HOSPITAL | Age: 63
End: 2018-12-16
Payer: MEDICARE

## 2018-12-16 VITALS
SYSTOLIC BLOOD PRESSURE: 130 MMHG | HEART RATE: 74 BPM | TEMPERATURE: 99.2 F | HEIGHT: 64 IN | RESPIRATION RATE: 18 BRPM | WEIGHT: 194.89 LBS | BODY MASS INDEX: 33.27 KG/M2 | DIASTOLIC BLOOD PRESSURE: 73 MMHG | OXYGEN SATURATION: 99 %

## 2018-12-16 DIAGNOSIS — N23 RENAL COLIC ON RIGHT SIDE: ICD-10-CM

## 2018-12-16 DIAGNOSIS — R10.9 ACUTE RIGHT FLANK PAIN: Primary | ICD-10-CM

## 2018-12-16 DIAGNOSIS — N13.4 HYDROURETER ON RIGHT: ICD-10-CM

## 2018-12-16 DIAGNOSIS — M79.7 FIBROMYALGIA: ICD-10-CM

## 2018-12-16 DIAGNOSIS — N20.0 KIDNEY STONE: ICD-10-CM

## 2018-12-16 LAB
ALBUMIN SERPL BCP-MCNC: 3.3 G/DL (ref 3.5–5)
ALP SERPL-CCNC: 84 U/L (ref 46–116)
ALT SERPL W P-5'-P-CCNC: 18 U/L (ref 12–78)
ANION GAP SERPL CALCULATED.3IONS-SCNC: 8 MMOL/L (ref 4–13)
AST SERPL W P-5'-P-CCNC: 10 U/L (ref 5–45)
BACTERIA UR QL AUTO: ABNORMAL /HPF
BASOPHILS # BLD AUTO: 0.01 THOUSANDS/ΜL (ref 0–0.1)
BASOPHILS NFR BLD AUTO: 0 % (ref 0–1)
BILIRUB SERPL-MCNC: 0.6 MG/DL (ref 0.2–1)
BILIRUB UR QL STRIP: NEGATIVE
BUN SERPL-MCNC: 9 MG/DL (ref 5–25)
CALCIUM SERPL-MCNC: 9.3 MG/DL (ref 8.3–10.1)
CHLORIDE SERPL-SCNC: 104 MMOL/L (ref 100–108)
CLARITY UR: CLEAR
CO2 SERPL-SCNC: 28 MMOL/L (ref 21–32)
COLOR UR: YELLOW
CREAT SERPL-MCNC: 1.02 MG/DL (ref 0.6–1.3)
EOSINOPHIL # BLD AUTO: 0 THOUSAND/ΜL (ref 0–0.61)
EOSINOPHIL NFR BLD AUTO: 0 % (ref 0–6)
ERYTHROCYTE [DISTWIDTH] IN BLOOD BY AUTOMATED COUNT: 12.3 % (ref 11.6–15.1)
GFR SERPL CREATININE-BSD FRML MDRD: 59 ML/MIN/1.73SQ M
GLUCOSE SERPL-MCNC: 118 MG/DL (ref 65–140)
GLUCOSE UR STRIP-MCNC: NEGATIVE MG/DL
HCT VFR BLD AUTO: 42.3 % (ref 34.8–46.1)
HGB BLD-MCNC: 14 G/DL (ref 11.5–15.4)
HGB UR QL STRIP.AUTO: ABNORMAL
HOLD SPECIMEN: NORMAL
IMM GRANULOCYTES # BLD AUTO: 0.04 THOUSAND/UL (ref 0–0.2)
IMM GRANULOCYTES NFR BLD AUTO: 0 % (ref 0–2)
KETONES UR STRIP-MCNC: NEGATIVE MG/DL
LEUKOCYTE ESTERASE UR QL STRIP: NEGATIVE
LYMPHOCYTES # BLD AUTO: 1.35 THOUSANDS/ΜL (ref 0.6–4.47)
LYMPHOCYTES NFR BLD AUTO: 12 % (ref 14–44)
MCH RBC QN AUTO: 32 PG (ref 26.8–34.3)
MCHC RBC AUTO-ENTMCNC: 33.1 G/DL (ref 31.4–37.4)
MCV RBC AUTO: 97 FL (ref 82–98)
MONOCYTES # BLD AUTO: 0.91 THOUSAND/ΜL (ref 0.17–1.22)
MONOCYTES NFR BLD AUTO: 8 % (ref 4–12)
MUCOUS THREADS UR QL AUTO: ABNORMAL
NEUTROPHILS # BLD AUTO: 8.68 THOUSANDS/ΜL (ref 1.85–7.62)
NEUTS SEG NFR BLD AUTO: 80 % (ref 43–75)
NITRITE UR QL STRIP: NEGATIVE
NON-SQ EPI CELLS URNS QL MICRO: ABNORMAL /HPF
NRBC BLD AUTO-RTO: 0 /100 WBCS
OTHER STN SPEC: ABNORMAL
PH UR STRIP.AUTO: 6 [PH] (ref 4.5–8)
PLATELET # BLD AUTO: 202 THOUSANDS/UL (ref 149–390)
PMV BLD AUTO: 11 FL (ref 8.9–12.7)
POTASSIUM SERPL-SCNC: 3.7 MMOL/L (ref 3.5–5.3)
PROT SERPL-MCNC: 7 G/DL (ref 6.4–8.2)
PROT UR STRIP-MCNC: NEGATIVE MG/DL
RBC # BLD AUTO: 4.37 MILLION/UL (ref 3.81–5.12)
RBC #/AREA URNS AUTO: ABNORMAL /HPF
SODIUM SERPL-SCNC: 140 MMOL/L (ref 136–145)
SP GR UR STRIP.AUTO: 1.01 (ref 1–1.03)
UROBILINOGEN UR QL STRIP.AUTO: 0.2 E.U./DL
WBC # BLD AUTO: 10.99 THOUSAND/UL (ref 4.31–10.16)
WBC #/AREA URNS AUTO: ABNORMAL /HPF

## 2018-12-16 PROCEDURE — 99284 EMERGENCY DEPT VISIT MOD MDM: CPT

## 2018-12-16 PROCEDURE — 96375 TX/PRO/DX INJ NEW DRUG ADDON: CPT

## 2018-12-16 PROCEDURE — 81001 URINALYSIS AUTO W/SCOPE: CPT | Performed by: EMERGENCY MEDICINE

## 2018-12-16 PROCEDURE — 36415 COLL VENOUS BLD VENIPUNCTURE: CPT | Performed by: EMERGENCY MEDICINE

## 2018-12-16 PROCEDURE — 96374 THER/PROPH/DIAG INJ IV PUSH: CPT

## 2018-12-16 PROCEDURE — 85025 COMPLETE CBC W/AUTO DIFF WBC: CPT | Performed by: EMERGENCY MEDICINE

## 2018-12-16 PROCEDURE — 74176 CT ABD & PELVIS W/O CONTRAST: CPT

## 2018-12-16 PROCEDURE — 80053 COMPREHEN METABOLIC PANEL: CPT | Performed by: EMERGENCY MEDICINE

## 2018-12-16 PROCEDURE — 96361 HYDRATE IV INFUSION ADD-ON: CPT

## 2018-12-16 RX ORDER — SODIUM CHLORIDE 9 MG/ML
125 INJECTION, SOLUTION INTRAVENOUS CONTINUOUS
Status: DISCONTINUED | OUTPATIENT
Start: 2018-12-16 | End: 2018-12-16 | Stop reason: HOSPADM

## 2018-12-16 RX ORDER — TAMSULOSIN HYDROCHLORIDE 0.4 MG/1
0.4 CAPSULE ORAL
Qty: 5 CAPSULE | Refills: 0 | Status: SHIPPED | OUTPATIENT
Start: 2018-12-16 | End: 2018-12-20 | Stop reason: SDUPTHER

## 2018-12-16 RX ORDER — KETOROLAC TROMETHAMINE 30 MG/ML
30 INJECTION, SOLUTION INTRAMUSCULAR; INTRAVENOUS ONCE
Status: COMPLETED | OUTPATIENT
Start: 2018-12-16 | End: 2018-12-16

## 2018-12-16 RX ORDER — ONDANSETRON 2 MG/ML
4 INJECTION INTRAMUSCULAR; INTRAVENOUS ONCE
Status: COMPLETED | OUTPATIENT
Start: 2018-12-16 | End: 2018-12-16

## 2018-12-16 RX ORDER — TAMSULOSIN HYDROCHLORIDE 0.4 MG/1
0.4 CAPSULE ORAL ONCE
Status: COMPLETED | OUTPATIENT
Start: 2018-12-16 | End: 2018-12-16

## 2018-12-16 RX ORDER — NAPROXEN 500 MG/1
500 TABLET ORAL 2 TIMES DAILY
Qty: 15 TABLET | Refills: 0 | Status: SHIPPED | OUTPATIENT
Start: 2018-12-16 | End: 2019-01-25 | Stop reason: ALTCHOICE

## 2018-12-16 RX ORDER — ONDANSETRON 4 MG/1
4 TABLET, FILM COATED ORAL EVERY 4 HOURS PRN
Qty: 6 TABLET | Refills: 0 | Status: SHIPPED | OUTPATIENT
Start: 2018-12-16 | End: 2018-12-20 | Stop reason: SDUPTHER

## 2018-12-16 RX ADMIN — SODIUM CHLORIDE 1000 ML: 0.9 INJECTION, SOLUTION INTRAVENOUS at 08:35

## 2018-12-16 RX ADMIN — ONDANSETRON 4 MG: 2 INJECTION, SOLUTION INTRAMUSCULAR; INTRAVENOUS at 08:35

## 2018-12-16 RX ADMIN — KETOROLAC TROMETHAMINE 30 MG: 30 INJECTION, SOLUTION INTRAMUSCULAR at 09:27

## 2018-12-16 RX ADMIN — TAMSULOSIN HYDROCHLORIDE 0.4 MG: 0.4 CAPSULE ORAL at 09:27

## 2018-12-16 NOTE — ED PROVIDER NOTES
History  Chief Complaint   Patient presents with    Flank Pain     right flank pain started friday morning  Nausea and vomiting  Pt gives hx of having off and on right flank pain  3 days   Has assoc nausea  And occ chills with pain  Pt denies fevers  No bowel changes  No urinary sx  No dark or bloody stool/urine  No vomiting  No CP or SOB  Pt does have hx kidney stone 2015 -requiring surgery  PMH  Vertigo hx; GERD; IBS; Fibromyalgia; kidney stone/surgery ; GB "problems"         History provided by:  Patient  Flank Pain   Pain location:  R flank  Pain quality: aching    Pain radiates to:  Does not radiate  Timing:  Intermittent  Progression:  Waxing and waning  Chronicity:  New  Context: not diet changes, not laxative use, not medication withdrawal, not recent travel, not retching, not suspicious food intake and not trauma    Worsened by:  Nothing  Associated symptoms: chills and nausea    Associated symptoms: no anorexia, no chest pain, no constipation, no cough, no diarrhea, no dysuria, no fever, no hematemesis, no hematochezia, no hematuria, no melena, no shortness of breath, no vaginal discharge and no vomiting    Risk factors: not elderly, not obese and no recent hospitalization        Prior to Admission Medications   Prescriptions Last Dose Informant Patient Reported? Taking?    DULoxetine (CYMBALTA) 30 mg delayed release capsule   Yes Yes   Sig: Take 30 mg by mouth daily     DULoxetine (CYMBALTA) 60 mg delayed release capsule   Yes Yes   Sig: Take 1 capsule by mouth daily   ergocalciferol (VITAMIN D2) 50,000 units   Yes Yes   Sig: Take 1 capsule by mouth once a week   meclizine (ANTIVERT) 25 mg tablet   Yes No   Sig: Take 1 tablet by mouth every 8 (eight) hours as needed     traZODone (DESYREL) 150 mg tablet   Yes No   Sig: Take 1 tablet by mouth daily at bedtime        Facility-Administered Medications: None       Past Medical History:   Diagnosis Date    Arthritis     Eczema     Last Assessed: 7/17/2013    Fibromyalgia     Last Assessed: 3/17/2015    Kidney stones     Sleep apnea        Past Surgical History:   Procedure Laterality Date    COLONOSCOPY      Complete    CYSTOSCOPY      with removal of object    CYSTOSCOPY W/ URETERAL STENT PLACEMENT Right     ESOPHAGEAL DILATION      ESOPHAGOGASTRODUODENOSCOPY  02/10/2015    Diagnostic    JOINT REPLACEMENT      right    KIDNEY STONE SURGERY      TUBAL LIGATION      and reversal    TUBOPLASTY / TUBOTUBAL ANASTOMOSIS      Oviductal Surgery       Family History   Problem Relation Age of Onset    Breast cancer Mother     Cancer Mother     Diabetes Mother     Heart disease Mother     Stroke Mother     Alcohol abuse Father     Cancer Father     Depression Father     Heart disease Father     Thyroid disease Father     Diabetes type II Father     Hypertension Sister     Other Family         Palpitations     I have reviewed and agree with the history as documented  Social History   Substance Use Topics    Smoking status: Never Smoker    Smokeless tobacco: Never Used    Alcohol use No      Comment: occassionally / Never drank alcohol per [employer]        Review of Systems   Constitutional: Positive for activity change and chills  Negative for appetite change, diaphoresis and fever  HENT: Negative  Negative for trouble swallowing and voice change  Eyes: Negative  Negative for visual disturbance  Respiratory: Negative  Negative for cough, chest tightness and shortness of breath  Cardiovascular: Negative  Negative for chest pain and leg swelling  Gastrointestinal: Positive for nausea  Negative for abdominal pain, anorexia, blood in stool, constipation, diarrhea, hematemesis, hematochezia, melena and vomiting  Genitourinary: Positive for flank pain (right)  Negative for difficulty urinating, dysuria, frequency, hematuria and vaginal discharge  Musculoskeletal: Negative for neck pain and neck stiffness  Skin: Negative  Negative for pallor, rash and wound  Neurological: Negative  Negative for dizziness, tremors, syncope and light-headedness  Psychiatric/Behavioral: Negative  Physical Exam  Physical Exam   Constitutional: She is oriented to person, place, and time  She appears well-developed and well-nourished  She is active and cooperative  Non-toxic appearance  She does not appear ill  No distress  HENT:   Head: Normocephalic and atraumatic  Mouth/Throat: Mucous membranes are not dry and not cyanotic  No oropharyngeal exudate, posterior oropharyngeal edema or posterior oropharyngeal erythema  Eyes: Pupils are equal, round, and reactive to light  Conjunctivae and EOM are normal    Neck: Normal range of motion and phonation normal  Neck supple  No JVD present  Cardiovascular: Regular rhythm, intact distal pulses and normal pulses  No perf edema or calf tenderness   Pulmonary/Chest: Effort normal  No accessory muscle usage  No respiratory distress  She has no wheezes  She has no rhonchi  She has no rales  Abdominal: Soft  Bowel sounds are normal  She exhibits no distension  There is CVA tenderness (right)  There is no rigidity, no rebound and no guarding  No hernia  Neurological: She is alert and oriented to person, place, and time  She has normal strength  No cranial nerve deficit  Skin: Skin is warm and dry  No lesion, no petechiae and no rash noted  She is not diaphoretic  No cyanosis  Psychiatric: She has a normal mood and affect  Her speech is normal and behavior is normal  Thought content normal  Cognition and memory are normal    Vitals reviewed        Vital Signs  ED Triage Vitals [12/16/18 0809]   Temperature Pulse Respirations Blood Pressure SpO2   99 8 °F (37 7 °C) 94 18 132/67 95 %      Temp Source Heart Rate Source Patient Position - Orthostatic VS BP Location FiO2 (%)   Temporal Monitor Lying Left arm --      Pain Score       4           Vitals:    12/16/18 0809 12/16/18 0941 BP: 132/67 130/73   Pulse: 94 74   Patient Position - Orthostatic VS: Lying Sitting       Visual Acuity      ED Medications  Medications   sodium chloride 0 9 % infusion (not administered)   sodium chloride 0 9 % bolus 1,000 mL (0 mL Intravenous Stopped 12/16/18 0935)   ondansetron (ZOFRAN) injection 4 mg (4 mg Intravenous Given 12/16/18 0835)   tamsulosin (FLOMAX) capsule 0 4 mg (0 4 mg Oral Given 12/16/18 0927)   ketorolac (TORADOL) injection 30 mg (30 mg Intravenous Given 12/16/18 0927)       Diagnostic Studies  Results Reviewed     Procedure Component Value Units Date/Time    Comprehensive metabolic panel [[id number]]  (Abnormal) Collected:  12/16/18 0833    Lab Status:  Final result Specimen:  Blood from Arm, Left Updated:  12/16/18 2889     Sodium 140 mmol/L      Potassium 3 7 mmol/L      Chloride 104 mmol/L      CO2 28 mmol/L      ANION GAP 8 mmol/L      BUN 9 mg/dL      Creatinine 1 02 mg/dL      Glucose 118 mg/dL      Calcium 9 3 mg/dL      AST 10 U/L      ALT 18 U/L      Alkaline Phosphatase 84 U/L      Total Protein 7 0 g/dL      Albumin 3 3 (L) g/dL      Total Bilirubin 0 60 mg/dL      eGFR 59 ml/min/1 73sq m     Narrative:         National Kidney Disease Education Program recommendations are as follows:  GFR calculation is accurate only with a steady state creatinine  Chronic Kidney disease less than 60 ml/min/1 73 sq  meters  Kidney failure less than 15 ml/min/1 73 sq  meters      Urine Microscopic [[id number]]  (Abnormal) Collected:  12/16/18 0823    Lab Status:  Final result Specimen:  Urine from Urine, Clean Catch Updated:  12/16/18 0857     RBC, UA 10-20 (A) /hpf      WBC, UA 2-4 (A) /hpf      Epithelial Cells Moderate (A) /hpf      Bacteria, UA Occasional /hpf      OTHER OBSERVATIONS Transitional Epithelial Cells     MUCUS THREADS Moderate (A)    UA w Reflex to Microscopic w Reflex to Culture [[id number]]  (Abnormal) Collected:  12/16/18 0823    Lab Status:  Final result Specimen:  Urine from Urine, Clean Catch Updated:  12/16/18 0846     Color, UA Yellow     Clarity, UA Clear     Specific Gravity, UA 1 015     pH, UA 6 0     Leukocytes, UA Negative     Nitrite, UA Negative     Protein, UA Negative mg/dl      Glucose, UA Negative mg/dl      Ketones, UA Negative mg/dl      Urobilinogen, UA 0 2 E U /dl      Bilirubin, UA Negative     Blood, UA Large (A)    CBC and differential [[id number]]  (Abnormal) Collected:  12/16/18 0833    Lab Status:  Final result Specimen:  Blood from Arm, Left Updated:  12/16/18 0845     WBC 10 99 (H) Thousand/uL      RBC 4 37 Million/uL      Hemoglobin 14 0 g/dL      Hematocrit 42 3 %      MCV 97 fL      MCH 32 0 pg      MCHC 33 1 g/dL      RDW 12 3 %      MPV 11 0 fL      Platelets 830 Thousands/uL      nRBC 0 /100 WBCs      Neutrophils Relative 80 (H) %      Immat GRANS % 0 %      Lymphocytes Relative 12 (L) %      Monocytes Relative 8 %      Eosinophils Relative 0 %      Basophils Relative 0 %      Neutrophils Absolute 8 68 (H) Thousands/µL      Immature Grans Absolute 0 04 Thousand/uL      Lymphocytes Absolute 1 35 Thousands/µL      Monocytes Absolute 0 91 Thousand/µL      Eosinophils Absolute 0 00 Thousand/µL      Basophils Absolute 0 01 Thousands/µL     [place] draw [[id number]] Collected:  12/16/18 0833    Lab Status: In process Specimen:  Blood Updated:  12/16/18 0841    Narrative: The following orders were created for panel order [place] draw  Procedure                               Abnormality         Status                     ---------                               -----------         ------                     Halle Hernández Top on [id number]]                           In process                 Gold top on PPBD[[id number]]                                 In process                 Green / Black tube on JGIL[[id number]]                       In process                   Please view results for these tests on the individual orders                   CT renal stone study abdomen pelvis without contrast   Final Result by Janel Hanks MD (12/16 9959)         1   Cluster of calculi within the mid right ureter measuring approximately 5 mm craniocaudal and resulting in moderate hydroureteronephrosis  2   Nonobstructing calculi within the left kidney  Workstation performed: [id number]                    Procedures  Procedures       Phone Contacts  ED Phone Contact    ED Course  ED Course as of Dec 16 0945   Sun Dec 16, 2018   0854 Leukocytes, UA: Negative   0854 Nitrite, UA: Negative   0854 Blood, UA: (!) Large   0857 RBC, UA: (!) 10-20   0857 MUCUS THREADS: (!) Moderate   0903 WBC: (!) 10 99   0907 Hemoglobin: 14 0   0907 HCT: 42 3   0908 RIGHT KIDNEY AND URETER:  There is a cluster of calculi within the mid ureter measuring approximately 5 mm craniocaudal and resulting in moderate hydroureteronephrosis  Eva Alu is increased perinephric stranding as well as stranding surrounding the dilated proximal ureter   No   well-formed perinephric fluid collection identified   Multiple parapelvic cysts are again seen  LEFT KIDNEY AND URETER:  There are nonobstructing intrarenal calculi measuring on the order of 4 mm   No hydronephrosis or hydroureter  Parapelvic cysts are again seen  0919 Pt comfortable- NAD  Discussed  results - stones   Will tx and refer to urology                                MDM  CritCare Time    Disposition  Final diagnoses:   Acute right flank pain   Renal colic on right side   Kidney stone   Hydroureter on right   Fibromyalgia     Time reflects when diagnosis was documented in both MDM as applicable and the Disposition within this note     Time User Action Codes Description Comment    12/16/2018  9:24 AM [place] Add [R10 9] Acute right flank pain     12/16/2018  9:24 AM [place] Add [V36] Renal colic on right side     12/16/2018  9:24 AM [place] Add [N20 0] Kidney stone     12/16/2018  9:24 AM [place] Add [N13 4] Hydroureter on right     12/16/2018  9:24 AM Cale Older Add [M79 7] Fibromyalgia       ED Disposition     ED Disposition Condition Comment    Discharge  Gleen Schilder discharge to home/self care  Condition at discharge: Stable        Follow-up Information     Follow up With Specialties Details Why Contact Info    Nataly Gonzalez MD Urology Call in 1 day  [address] [address]  [phone]            Discharge Medication List as of 12/16/2018  9:29 AM      START taking these medications    Details   naproxen (NAPROSYN) 500 mg tablet Take 1 tablet (500 mg total) by mouth 2 (two) times a day, Starting Sun 12/16/2018, Print      ondansetron (ZOFRAN) 4 mg tablet Take 1 tablet (4 mg total) by mouth every 4 (four) hours as needed for nausea or vomiting, Starting Sun 12/16/2018, Print      tamsulosin (FLOMAX) 0 4 mg Take 1 capsule (0 4 mg total) by mouth daily with dinner for 5 days, Starting Sun 12/16/2018, Until Fri 12/21/2018, Print         CONTINUE these medications which have NOT CHANGED    Details   !! DULoxetine (CYMBALTA) 30 mg delayed release capsule Take 30 mg by mouth daily  , Historical Med      !! DULoxetine (CYMBALTA) 60 mg delayed release capsule Take 1 capsule by mouth daily, Historical Med      ergocalciferol (VITAMIN D2) 50,000 units Take 1 capsule by mouth once a week, Starting Thu 7/7/2011, Historical Med      meclizine (ANTIVERT) 25 mg tablet Take 1 tablet by mouth every 8 (eight) hours as needed  , Starting Thu 3/16/2017, Historical Med      traZODone (DESYREL) 150 mg tablet Take 1 tablet by mouth daily at bedtime  , Starting Fri 7/15/2011, Historical Med       !! - Potential duplicate medications found  Please discuss with provider  No discharge procedures on file      ED Provider  Electronically Signed by           Glen Orantes DO  12/16/18 1717

## 2018-12-16 NOTE — DISCHARGE INSTRUCTIONS
Lots of clear liquids  Medication as prescribed  Call in AM to urology for follow up  If uncontrolled/ worsening sym[ptoms - return right away            Flank Pain   WHAT YOU NEED TO KNOW:   Flank pain is felt in the area below your ribcage and above your hip bones, often in the lower back  Your pain may be dull or so severe that you cannot get comfortable  The pain may stay in one area or radiate to another area  It may worsen and lighten in waves  Flank pain is often a sign of problems with your urinary tract, such as a kidney stone or infection  DISCHARGE INSTRUCTIONS:   Return to the emergency department if:   · You have a fever  · Your heart is fluttering or jumping  · You see blood in your urine  · Your pain radiates into your lower abdomen and genital area  · You have intense pain in your low back next to your spine  · You are much more tired than usual and have no desire to eat  · You have a headache and your muscles jerk  Contact your healthcare provider if:   · You have an upset stomach and are vomiting  · You have to urinate more often, and with urgency  · Your pain worsens or does not improve, and you cannot get comfortable  · You pass a stone when you urinate  · You have questions or concerns about your condition or care  Medicines: The following medicines may be ordered for you:  · Pain medicine  may help decrease or relieve your pain  Do not wait until the pain is severe before you take your medicine  · Antibiotics  may help treat a urinary tract infection caused by bacteria  · Take your medicine as directed  Contact your healthcare provider if you think your medicine is not helping or if you have side effects  Tell him of her if you are allergic to any medicine  Keep a list of the medicines, vitamins, and herbs you take  Include the amounts, and when and why you take them  Bring the list or the pill bottles to follow-up visits   Carry your medicine list with you in case of an emergency  Follow up with your healthcare provider in 1 to 2 days or as directed:  Write down your questions so you remember to ask them during your visits  © 2017 [employer] Information is for End User's use only and may not be sold, redistributed or otherwise used for commercial purposes  All illustrations and images included in CareNotes® are the copyrighted property of [employer]  or Michael Bhatti  The above information is an  only  It is not intended as medical advice for individual conditions or treatments  Talk to your doctor, nurse or pharmacist before following any medical regimen to see if it is safe and effective for you  Kidney Stones   WHAT YOU NEED TO KNOW:   Kidney stones form in the urinary system when the water and waste in your urine are out of balance  When this happens, certain types of waste crystals separate from the urine  The crystals build up and form kidney stones  You may have 1 or more kidney stones  DISCHARGE INSTRUCTIONS:   Seek care immediately:   · You have vomiting that is not relieved by medicine  Contact your healthcare provider if:   · You have a fever  · You have trouble passing urine  · You see blood in your urine  · You have severe pain  · You have any questions or concerns about your condition or care  Medicines:   · NSAIDs , such as ibuprofen, help decrease swelling, pain, and fever  This medicine is available with or without a doctor's order  NSAIDs can cause stomach bleeding or kidney problems in certain people  If you take blood thinner medicine, always ask your healthcare provider if NSAIDs are safe for you  Always read the medicine label and follow directions  · Prescription medicine  may be given  Ask how to take this medicine safely  · Medicines  to balance your electrolytes may be needed  · Take your medicine as directed    Contact your healthcare provider if you think your medicine is not helping or if you have side effects  Tell him or her if you are allergic to any medicine  Keep a list of the medicines, vitamins, and herbs you take  Include the amounts, and when and why you take them  Bring the list or the pill bottles to follow-up visits  Carry your medicine list with you in case of an emergency  Follow up with your healthcare provider as directed: You may need to return for more tests  Write down your questions so you remember to ask them during your visits  Self-care:   · Drink plenty of liquids  Your healthcare provider may tell you to drink at least 8 to 12 (eight-ounce) cups of liquids each day  This helps flush out the kidney stones when you urinate  Water is the best liquid to drink  · Strain your urine every time you go to the bathroom  Urinate through a strainer or a piece of thin cloth to catch the stones  Take the stones to your healthcare provider so they can be sent to the lab for tests  This will help your healthcare providers plan the best treatment for you  · Eat a variety of healthy foods  Healthy foods include fruits, vegetables, whole-grain breads, low-fat dairy products, beans, and fish  You may need to limit how much sodium (salt) or protein you eat  Ask for information about the best foods for you  · Stay active  Your stones may pass more easily by if you stay active  Ask about the best activities for you  After you pass your kidney stones:  Once you have passed your kidney stones, your healthcare provider may  order a 24-hour urine test  Results from a 24-hour urine test will help your healthcare provider plan ways to prevent more stones from forming  If you are told to do a 24-hour test, your healthcare provider will give you more instructions  © 2017 [employer] Information is for End User's use only and may not be sold, redistributed or otherwise used for commercial purposes   All illustrations and images included in CareNotes® are the copyrighted property of [employer]  or Michael Bhatti  The above information is an  only  It is not intended as medical advice for individual conditions or treatments  Talk to your doctor, nurse or pharmacist before following any medical regimen to see if it is safe and effective for you  Renal Colic   WHAT YOU NEED TO KNOW:   What is renal colic? Renal colic is severe pain in your lower back or sides  The pain is usually on one side, but may be on both sides of your lower back  Renal colic may start quickly, come and go, and become worse over time  What causes renal colic? Renal colic is caused by a blockage in your urinary tract  The urinary tract includes your kidneys, ureters, bladder, and urethra  Ureters carry urine from your kidneys to your bladder  The urethra carries urine to the outside when you urinate  The most common cause of a blockage in the urinary tract is a kidney stone  Blood clots, ureter spasms, and dead tissue may also block your urinary tract  What other signs and symptoms may occur with renal colic? · Severe low back, abdominal, or groin pain    · Pain when you urinate    · Nausea and vomiting    · Feeling the need to urinate often, or right away    · Urinating less than what is normal for you, or not at all    · Fever  How is renal colic diagnosed? · Blood and urine tests  may show infection or kidney function  · An x-ray, ultrasound, CT, or MRI  may show a kidney stone or other causes of your pain  You may be given contrast liquid to help your urinary tract show up better in the pictures  Tell the healthcare provider if you have ever had an allergic reaction to contrast liquid  Do not enter the MRI room with anything metal  Metal can cause serious injury  Tell the healthcare provider if you have any metal in or on your body  How is renal colic treated?    · Medicines  may help decrease pain and muscle spasms  You may also need medicine to calm your stomach and stop vomiting  · Surgery  may be needed to remove a blockage  Surgery may also be needed if your kidneys are not working properly  How can I manage my symptoms? · Drink liquids as directed  to help decrease pain and flush blockages from your urinary tract  Ask how much liquid to drink each day and which liquids are best for you  You may need to drink about 3 liters (12 glasses) of liquids each day  Half of your total daily liquids should be water  Limit coffee, tea, and soda to 2 cups daily  Your urine should be pale and clear  · Strain your urine every time you urinate  Urinate into a strainer (funnel with a fine mesh on the bottom) or glass jar to collect kidney stones  Give the kidney stones to your healthcare provider at your next visit  · Eat a variety of healthy foods  Healthy foods include fruits, vegetables, whole-grain breads, low-fat dairy products, beans, lean meats, and fish  You may need to increase the amount of citrus fruit you eat, such as oranges  Ask your healthcare provider how much salt, calcium, and protein you should eat  · Avoid activity in hot temperatures  Heat may cause you to become dehydrated and urinate less  When should I seek immediate care? · You cannot stop vomiting  · You see new or increased bleeding when you urinate  · You are urinating less than usual, or not at all  · Your pain is not getting better even after treatment  When should I contact my healthcare provider? · You have fever  · You need to urinate more often than usual, or right away  · You see a stone in your urine strainer after you urinate  · You have questions or concerns about your condition or care  CARE AGREEMENT:   You have the right to help plan your care  Learn about your health condition and how it may be treated  Discuss treatment options with your caregivers to decide what care you want to receive  You always have the right to refuse treatment  The above information is an  only  It is not intended as medical advice for individual conditions or treatments  Talk to your doctor, nurse or pharmacist before following any medical regimen to see if it is safe and effective for you  © 2017 [employer] Information is for End User's use only and may not be sold, redistributed or otherwise used for commercial purposes  All illustrations and images included in CareNotes® are the copyrighted property of [employer]  or Michael Bhatti

## 2018-12-17 ENCOUNTER — TELEPHONE (OUTPATIENT)
Dept: UROLOGY | Facility: AMBULATORY SURGERY CENTER | Age: 63
End: 2018-12-17

## 2018-12-17 ENCOUNTER — OFFICE VISIT (OUTPATIENT)
Dept: PHYSICAL THERAPY | Facility: CLINIC | Age: 63
End: 2018-12-17
Payer: MEDICARE

## 2018-12-17 ENCOUNTER — HOSPITAL ENCOUNTER (OUTPATIENT)
Facility: HOSPITAL | Age: 63
Setting detail: OUTPATIENT SURGERY
End: 2018-12-17
Attending: UROLOGY | Admitting: UROLOGY
Payer: MEDICARE

## 2018-12-17 ENCOUNTER — OFFICE VISIT (OUTPATIENT)
Dept: UROLOGY | Facility: CLINIC | Age: 63
End: 2018-12-17
Payer: MEDICARE

## 2018-12-17 VITALS
SYSTOLIC BLOOD PRESSURE: 130 MMHG | BODY MASS INDEX: 32.95 KG/M2 | HEART RATE: 92 BPM | HEIGHT: 64 IN | WEIGHT: 193 LBS | DIASTOLIC BLOOD PRESSURE: 88 MMHG

## 2018-12-17 DIAGNOSIS — R26.89 BALANCE PROBLEM: Primary | ICD-10-CM

## 2018-12-17 DIAGNOSIS — N20.0 NEPHROLITHIASIS: Primary | ICD-10-CM

## 2018-12-17 DIAGNOSIS — M25.561 ACUTE PAIN OF RIGHT KNEE: ICD-10-CM

## 2018-12-17 DIAGNOSIS — Z96.651 PRESENCE OF RIGHT ARTIFICIAL KNEE JOINT: ICD-10-CM

## 2018-12-17 LAB
SL AMB  POCT GLUCOSE, UA: ABNORMAL
SL AMB LEUKOCYTE ESTERASE,UA: ABNORMAL
SL AMB POCT BILIRUBIN,UA: ABNORMAL
SL AMB POCT BLOOD,UA: ABNORMAL
SL AMB POCT CLARITY,UA: CLEAR
SL AMB POCT COLOR,UA: YELLOW
SL AMB POCT KETONES,UA: ABNORMAL
SL AMB POCT NITRITE,UA: ABNORMAL
SL AMB POCT PH,UA: -5
SL AMB POCT SPECIFIC GRAVITY,UA: 1.02
SL AMB POCT URINE PROTEIN: 300
SL AMB POCT UROBILINOGEN: ABNORMAL

## 2018-12-17 PROCEDURE — 99205 OFFICE O/P NEW HI 60 MIN: CPT | Performed by: PHYSICIAN ASSISTANT

## 2018-12-17 PROCEDURE — 97140 MANUAL THERAPY 1/> REGIONS: CPT | Performed by: PHYSICAL THERAPIST

## 2018-12-17 PROCEDURE — 97150 GROUP THERAPEUTIC PROCEDURES: CPT | Performed by: PHYSICAL THERAPIST

## 2018-12-17 PROCEDURE — 97110 THERAPEUTIC EXERCISES: CPT | Performed by: PHYSICAL THERAPIST

## 2018-12-17 PROCEDURE — 81002 URINALYSIS NONAUTO W/O SCOPE: CPT | Performed by: PHYSICIAN ASSISTANT

## 2018-12-17 PROCEDURE — 87086 URINE CULTURE/COLONY COUNT: CPT | Performed by: PHYSICIAN ASSISTANT

## 2018-12-17 RX ORDER — TRAMADOL HYDROCHLORIDE 50 MG/1
50 TABLET ORAL EVERY 6 HOURS PRN
COMMUNITY
End: 2019-01-25 | Stop reason: ALTCHOICE

## 2018-12-17 RX ORDER — CEPHALEXIN 500 MG/1
CAPSULE ORAL
COMMUNITY
End: 2018-12-31

## 2018-12-17 NOTE — PROGRESS NOTES
12/17/2018      Chief Complaint   Patient presents with    Nephrolithiasis       Assessment and Plan    59 y o  female     1  Cluster of calculi within the mid right ureter measuring approximately 5 mm  - discussed with the patient that it is possible for her to pass her calculi but given there are multiple would like to schedule ureteroscopy  - while waiting ureteroscopy the patient was educated to continue Flomax, proper hydration was 60 oz of water daily, strain her urine  - H&P performed, case request placed, and surgery risks reviewed  - patient to present to the emergency room with any intractable flank pain not responsive to oral pain medications, nausea and vomiting that prevents oral intake of oral fluids, fevers above 101 3, and large amounts of blood in the urine           History of Present Illness  Waqas Springer is a 59 y o  female here for initial evaluation of a cluster of calculi within her mid right ureter  This was diagnosed on CT scan while in the emergency department 12/16/2018  This is the 2nd time the patient has had obstructing ureteral calculi  Underwent ureteroscopy in 2015 for this  Presents today with no symptoms including but not limited to nausea, vomiting, fever, chills, hematuria, and flank pain  Review of Systems   Constitutional: Negative for activity change, chills and fever  Gastrointestinal: Negative for abdominal distention and abdominal pain  Musculoskeletal: Negative for back pain and gait problem  Psychiatric/Behavioral: Negative for behavioral problems and confusion         Past Medical History  Past Medical History:   Diagnosis Date    Arthritis     Eczema     Last Assessed: 7/17/2013    Fibromyalgia     Last Assessed: 3/17/2015    Kidney stones     Sleep apnea        Past Social History  Past Surgical History:   Procedure Laterality Date    COLONOSCOPY      Complete    CYSTOSCOPY      with removal of object    CYSTOSCOPY W/ URETERAL STENT PLACEMENT Right     ESOPHAGEAL DILATION      ESOPHAGOGASTRODUODENOSCOPY  02/10/2015    Diagnostic    JOINT REPLACEMENT      right    KIDNEY STONE SURGERY      TUBAL LIGATION      and reversal    TUBOPLASTY / TUBOTUBAL ANASTOMOSIS      Oviductal Surgery     History   Smoking Status    Never Smoker   Smokeless Tobacco    Never Used       Past Family History  Family History   Problem Relation Age of Onset    Breast cancer Mother     Cancer Mother     Diabetes Mother     Heart disease Mother     Stroke Mother     Alcohol abuse Father     Cancer Father     Depression Father     Heart disease Father     Thyroid disease Father     Diabetes type II Father     Hypertension Sister     Other Family         Palpitations       Past Social history  Social History     Social History    Marital status: /Civil Union     Spouse name: N/A    Number of children: N/A    Years of education: N/A     Occupational History    Not on file       Social History Main Topics    Smoking status: Never Smoker    Smokeless tobacco: Never Used    Alcohol use No      Comment: occassionally / Never drank alcohol per [employer]    Drug use: No    Sexual activity: Not on file     Other Topics Concern    Not on file     Social History Narrative    No living will       Current Medications  Current Outpatient Prescriptions   Medication Sig Dispense Refill    DULoxetine (CYMBALTA) 30 mg delayed release capsule Take 30 mg by mouth daily        DULoxetine (CYMBALTA) 60 mg delayed release capsule Take 1 capsule by mouth daily      ergocalciferol (VITAMIN D2) 50,000 units Take 1 capsule by mouth once a week      meclizine (ANTIVERT) 25 mg tablet Take 1 tablet by mouth every 8 (eight) hours as needed        naproxen (NAPROSYN) 500 mg tablet Take 1 tablet (500 mg total) by mouth 2 (two) times a day 15 tablet 0    ondansetron (ZOFRAN) 4 mg tablet Take 1 tablet (4 mg total) by mouth every 4 (four) hours as needed for nausea or vomiting 6 tablet 0    tamsulosin (FLOMAX) 0 4 mg Take 1 capsule (0 4 mg total) by mouth daily with dinner for 5 days 5 capsule 0    traMADol (ULTRAM) 50 mg tablet Take 50 mg by mouth every 6 (six) hours as needed      traZODone (DESYREL) 150 mg tablet Take 1 tablet by mouth daily at bedtime        cephalexin (KEFLEX) 500 mg capsule Take 4 capsules (500 mg) 1 hour prior to dental procedure       No current facility-administered medications for this visit  Allergies  Allergies   Allergen Reactions    Codeine Other (See Comments)     Reaction Date: 26Jan2012; Hallucinations     Epinephrine Other (See Comments)     Syncope     Latex Swelling    Neosporin Plus Max St          The following portions of the patient's history were reviewed and updated as appropriate: allergies, current medications, past medical history, past social history, past surgical history and problem list       Vitals  Vitals:    12/17/18 1406   BP: 130/88   Pulse: 92   Weight: 87 5 kg (193 lb)   Height: 5' 4" (1 626 m)           Physical Exam  Constitutional   General appearance: Patient is seated and in no acute distress, well appearing and well nourished  Head and Face   Head and face: Normal     Eyes   Conjunctiva and lids: No erythema, swelling or discharge  Ears, Nose, Mouth, and Throat   Hearing: Normal     Pulmonary   Lungs: Clear to auscultation with no wheezes, rhonchi, or rales  Respiratory effort: No increased work of breathing or signs of respiratory distress  Cardiovascular   Heart: Regular rate and rhythm, no gallops, no murmurs  Examination of extremities for edema and/or varicosities: Normal     Abdomen   Abdomen: Non-tender, no masses  Musculoskeletal   Gait and station: Walks with a cane  Skin   Skin and subcutaneous tissue: Warm, dry, and intact  No visible rashes or lesions     Psychiatric   Mood and affect: Normal      Results  Recent Results (from the past 1 hour(s))   POCT urine dip Collection Time: 12/17/18  2:14 PM   Result Value Ref Range    LEUKOCYTE ESTERASE,UA -     NITRITE,UA -     SL AMB POCT UROBILINOGEN -     POCT URINE PROTEIN 300      PH,UA -5 0     BLOOD,UA large     SPECIFIC GRAVITY,UA 1 025     KETONES,UA -     BILIRUBIN,UA -     GLUCOSE, UA -      COLOR,UA yellow     CLARITY,UA clear    ]  No results found for: PSA  Lab Results   Component Value Date    GLUCOSE 106 07/04/2016    CALCIUM 9 3 12/16/2018     10/21/2015    K 3 7 12/16/2018    CO2 28 12/16/2018     12/16/2018    BUN 9 12/16/2018    CREATININE 1 02 12/16/2018     Lab Results   Component Value Date    WBC 10 99 (H) 12/16/2018    HGB 14 0 12/16/2018    HCT 42 3 12/16/2018    MCV 97 12/16/2018     12/16/2018       Orders  Orders Placed This Encounter   Procedures    POCT urine dip

## 2018-12-17 NOTE — TELEPHONE ENCOUNTER
Reason for appointment/Complaint/Diagnosis : 1   Cluster of calculi within the mid right ureter measuring approximately 5 mm craniocaudal and resulting in moderate hydroureteronephrosis           2  Nonobstructing calculi within the left kidney  Insurance: Medicare & [employer]     History of [address]                   Records requested/where? In [facility] [address]     Outside testing/where? In [facility]     Location Preference for office visit?  [facility] or [place]

## 2018-12-18 ENCOUNTER — HOSPITAL ENCOUNTER (OUTPATIENT)
Dept: RADIOLOGY | Facility: HOSPITAL | Age: 63
Setting detail: OUTPATIENT SURGERY
Discharge: HOME | End: 2018-12-18
Payer: MEDICARE

## 2018-12-18 DIAGNOSIS — N20.0 NEPHROLITHIASIS: ICD-10-CM

## 2018-12-18 LAB — BACTERIA UR CULT: NORMAL

## 2018-12-19 ENCOUNTER — OFFICE VISIT (OUTPATIENT)
Dept: PHYSICAL THERAPY | Facility: CLINIC | Age: 63
End: 2018-12-19
Payer: MEDICARE

## 2018-12-19 ENCOUNTER — APPOINTMENT (OUTPATIENT)
Dept: PHYSICAL THERAPY | Facility: CLINIC | Age: 63
End: 2018-12-19
Payer: MEDICARE

## 2018-12-19 DIAGNOSIS — R26.89 BALANCE PROBLEM: Primary | ICD-10-CM

## 2018-12-19 DIAGNOSIS — Z96.651 PRESENCE OF RIGHT ARTIFICIAL KNEE JOINT: ICD-10-CM

## 2018-12-19 DIAGNOSIS — M25.561 ACUTE PAIN OF RIGHT KNEE: ICD-10-CM

## 2018-12-19 PROCEDURE — 97140 MANUAL THERAPY 1/> REGIONS: CPT | Performed by: PHYSICAL THERAPIST

## 2018-12-19 PROCEDURE — 97110 THERAPEUTIC EXERCISES: CPT | Performed by: PHYSICAL THERAPIST

## 2018-12-19 PROCEDURE — 97150 GROUP THERAPEUTIC PROCEDURES: CPT | Performed by: PHYSICAL THERAPIST

## 2018-12-19 NOTE — PROGRESS NOTES
Daily Note     Today's date: 2018  Patient name: Bolivar Ortiz  : 1955  MRN: [id number]  Referring provider: Jenn Sarkar MD  Dx:   Encounter Diagnosis     ICD-10-CM    1  Balance problem R26 89    2  Presence of right artificial knee joint Z96 651    3  Acute pain of right knee M25 561                   Subjective:  Patient reports she was scheduled to have kidney stone removal surgery on  but surgery cancelled secondary to MD not available  Patient reports she was frustrated with her surgery being cancelled and not re-scheduled on 19  Patient reports continuation of nausea with increase LBP but states she will try to complete as much as she can during the session  Patient reports 2/10 soreness in the right knee at the start of the session  Post session, the patient reports the knees are still stiff but but soreness of 1/10          Objective: See treatment diary below    Precautions:  WBAT Right LE     Specialty Daily Treatment Diary     Manual      Bilat knee and LE stretching and mobs  15 min  15 min  15 min  15 min                                         Exercise Diary      Nu-step  10 min L5 10 min L5 10 min L5  10 min L5    Stand SLR all directions 2x10 Bilat  1 5#  2x10 Bilat   1 5#    NT 10x Bilat     Stand HR/TR On 4" step   2x10 Bilat  2x10 Bilat   4" step  2x10 Bilat  4" step  10X Bilat  4" step    S/L Gastroc Stretch on step  10x10" Hold   Bilat  4" step  10x10" Hold  Bilat  4" step  10x10" Hold   Bilat  4" step  10x 10" hold  bilat   4" step    Front and lateral step-up  2x10 Bilat   6" step  2x10 Bilat   6" step  10x Bilat   4" step  10x  4" step    Step over  2x10 Bilat   6" step  2x10 Bilat  6" step  NT  10x bilat  6" step    SLS and tandem on foam  4x15" Hold   Bilat   Tandem only  SLS 3x15" Hold  NT  NT     Side step and squat   4x10 Feet   Light Blue  NT  NT    Monster walk 4x 10 feet  Light blue  4x10 Feet  Light Blue  NT NT    Rocker board         High knee walk   4x10 Feet   2 0#  NT  NT     Quad set         Heel slides with strap         Bridge with Tball  10X With Orange Tball  2x10 with Orange Tball NT   2x10 with   [employer]    SAQ         DKTC with Tball  2x10 5" Hold Orange Tball  2x10 5" hold  Orange Tball 2x10 5" Hold   Orange Tball  2x10 5" hold  Orange Tball    LTR with Tball   10x5" Hold   Orange Tball  2x10 5" Hold   Orange Tball  [place]                                Modalities 12/7 12/12 12/17 12/19    MHP to Low back and bilateral knees  15 min   Used CP to R knee today 15 min with   MHP to the LB and left knee in seated  15 min MHP to the left knee and CP to the right knee  15 min MHP to the left knee and CP to the right knee                           Assessment: Tolerated treatment fair  PT notes modified treatment secondary to nausea and continuation of dealing with kidney stone issues but PT will continue to progress toward therapy goals and full TE session as tolerated by patient  Plan: Continue per plan of care

## 2018-12-20 ENCOUNTER — TELEPHONE (OUTPATIENT)
Dept: UROLOGY | Facility: AMBULATORY SURGERY CENTER | Age: 63
End: 2018-12-20

## 2018-12-20 DIAGNOSIS — N20.0 KIDNEY STONE: ICD-10-CM

## 2018-12-20 RX ORDER — TAMSULOSIN HYDROCHLORIDE 0.4 MG/1
0.4 CAPSULE ORAL
Qty: 30 CAPSULE | Refills: 0 | Status: SHIPPED | OUTPATIENT
Start: 2018-12-20 | End: 2019-01-25 | Stop reason: ALTCHOICE

## 2018-12-20 RX ORDER — ONDANSETRON 4 MG/1
4 TABLET, FILM COATED ORAL EVERY 8 HOURS PRN
Qty: 20 TABLET | Refills: 0 | Status: SHIPPED | OUTPATIENT
Start: 2018-12-20 | End: 2019-01-25 | Stop reason: ALTCHOICE

## 2018-12-20 RX ORDER — METOCLOPRAMIDE 10 MG/1
5 TABLET ORAL 4 TIMES DAILY
Qty: 15 TABLET | Refills: 0 | Status: SHIPPED | OUTPATIENT
Start: 2018-12-20 | End: 2018-12-31

## 2018-12-20 NOTE — TELEPHONE ENCOUNTER
Patient was seen as consult for cluster of calculi within the right mid ureter by Cheyanne Parikh PA-C on 12/17/18 at the [address] [address] office  At that time patient was instructed to continue to take Flomax and is scheduled for ureteroscopy on 1/8/19  Patient calling today, requesting Flomax refill, as ER only prescribed 5 day course  Patient also requesting Zofran renewed  Patient reports some nausea, occasionally when she voids  Concerned with the holidays approaching and would like Zofran just in case  Per patient she thinks she has passed 4 tiny stones so far  Patient reports pain is controlled  Denies fever  Patient knows to continue to monitor symptoms, if pain worsens, becomes uncontrolled, she develops severe nausea or vomiting or fever, knows to call office or go to ER  Patient would like refills to go to [facility] in [facility]

## 2018-12-20 NOTE — TELEPHONE ENCOUNTER
Patient would like a refill sent to [facility] in [facility] for Flomax and Zofran  Phone number is [phone]  Does not want it sent to [facility]

## 2018-12-20 NOTE — TELEPHONE ENCOUNTER
Spoke with pharmacist at [facility], per pharmacist, they did receive script for Zofran, but patient's insurance is saying patient has reached the maximum that can be dispensed in 30 days  Per pharmacist, maximum is 20 tablets in 30 days  Per record ER had sent 6 tablets Zofran to [facility] last week  Pharmacist, reports insurance won't allow even to fill just 14 tablets  Patient remains concerned possibility of nausea  Instructed patient, can discuss with provider alternative nausea medication  Will discuss and call patient back  Please advise if alternative anti-emetic can be prescribed

## 2018-12-21 ENCOUNTER — OFFICE VISIT (OUTPATIENT)
Dept: PHYSICAL THERAPY | Facility: CLINIC | Age: 63
End: 2018-12-21
Payer: MEDICARE

## 2018-12-21 ENCOUNTER — APPOINTMENT (OUTPATIENT)
Dept: PHYSICAL THERAPY | Facility: CLINIC | Age: 63
End: 2018-12-21
Payer: MEDICARE

## 2018-12-21 DIAGNOSIS — R26.89 BALANCE PROBLEM: Primary | ICD-10-CM

## 2018-12-21 DIAGNOSIS — Z96.651 PRESENCE OF RIGHT ARTIFICIAL KNEE JOINT: ICD-10-CM

## 2018-12-21 DIAGNOSIS — M25.561 ACUTE PAIN OF RIGHT KNEE: ICD-10-CM

## 2018-12-21 PROCEDURE — 97112 NEUROMUSCULAR REEDUCATION: CPT | Performed by: PHYSICAL THERAPIST

## 2018-12-21 PROCEDURE — 97110 THERAPEUTIC EXERCISES: CPT | Performed by: PHYSICAL THERAPIST

## 2018-12-21 PROCEDURE — 97140 MANUAL THERAPY 1/> REGIONS: CPT | Performed by: PHYSICAL THERAPIST

## 2018-12-21 NOTE — PROGRESS NOTES
Daily Note     Today's date: 2018  Patient name: Jacki Liang  : 1955  MRN: [id number]  Referring provider: Juana Champion MD  Dx:   Encounter Diagnosis     ICD-10-CM    1  Balance problem R26 89    2  Presence of right artificial knee joint Z96 651    3  Acute pain of right knee M25 561                   Subjective:  Patient reports the knee is stiff this morning but overall feels the pain levels are down  Patient reports the secondary issue with the kidney stones has also calmed down  Patient reports 2/10 stiffness in the the right knee at the start of the session  Post session, the patient reports 1/10 stiffness in the bilateral knees         Objective: See treatment diary below    Precautions:  WBAT Right LE     Specialty Daily Treatment Diary     Manual     Bilat knee and LE stretching and mobs  15 min  15 min  15 min  15 min  15 min                                        Exercise Diary     Nu-step  10 min L5 10 min L5 10 min L5  10 min L5 10 min L5    Stand SLR all directions 2x10 Bilat  1 5#  2x10 Bilat   1 5#    NT 10x Bilat  2x10 Bilat  1 0#    Stand HR/TR On 4" step   2x10 Bilat  2x10 Bilat   4" step  2x10 Bilat  4" step  10X Bilat  4" step 10x Bilat   4" step    S/L Gastroc Stretch on step  10x10" Hold   Bilat  4" step  10x10" Hold  Bilat  4" step  10x10" Hold   Bilat  4" step  10x 10" hold  bilat   4" step 10x10" Hold   Bilat  4" step    Front and lateral step-up  2x10 Bilat   6" step  2x10 Bilat   6" step  10x Bilat   4" step  10x  4" step 10x Bilat  4" step    Step over  2x10 Bilat   6" step  2x10 Bilat  6" step  NT  10x bilat  4" step 10x Bilat  4" step    SLS and tandem on foam  4x15" Hold   Bilat   Tandem only  SLS 3x15" Hold  NT  NT  NT   Side step and squat   4x10 Feet   Light Blue  NT  NT 6x10 Feet   Light Blue    Monster walk 4x 10 feet  Light blue  4x10 Feet  Light Blue  NT  NT 6x10 Feet   Light Blue    Rocker board High knee walk   4x10 Feet   2 0#  NT  NT  NT   Quad set         Heel slides with strap         Bridge with Tball  10X With Orange Tball  2x10 with Orange Tball NT   2x10 with   [employer]    SAQ         DKTC with Tball  2x10 5" Hold Orange Tball  2x10 5" hold  Orange Tball 2x10 5" Hold   Orange Tball  2x10 5" hold  Orange Tball    LTR with Tball   10x5" Hold   Orange Tball  2x10 5" Hold   Orange Tball  [place]                                Modalities 12/7 12/12 12/17 12/19 12/21   MHP to Low back and bilateral knees  15 min   Used CP to R knee today 15 min with   MHP to the LB and left knee in seated  15 min MHP to the left knee and CP to the right knee  15 min MHP to the left knee and CP to the right knee  15 min MHP to the left knee and CP to the right knee                            Assessment: Tolerated treatment well  PT notes patient able to tolerate increase standing and balance TE during the session secondary to decrease kidney stone symptoms but PT notes the patient with continuation of right quad weakness with need for continuation of skilled therapy  Plan: Continue per plan of care

## 2018-12-24 ENCOUNTER — OFFICE VISIT (OUTPATIENT)
Dept: PHYSICAL THERAPY | Facility: CLINIC | Age: 63
End: 2018-12-24
Payer: MEDICARE

## 2018-12-24 DIAGNOSIS — Z96.651 PRESENCE OF RIGHT ARTIFICIAL KNEE JOINT: ICD-10-CM

## 2018-12-24 DIAGNOSIS — M25.561 ACUTE PAIN OF RIGHT KNEE: ICD-10-CM

## 2018-12-24 DIAGNOSIS — R26.89 BALANCE PROBLEM: Primary | ICD-10-CM

## 2018-12-24 PROCEDURE — 97110 THERAPEUTIC EXERCISES: CPT | Performed by: PHYSICAL THERAPIST

## 2018-12-24 PROCEDURE — 97150 GROUP THERAPEUTIC PROCEDURES: CPT | Performed by: PHYSICAL THERAPIST

## 2018-12-24 PROCEDURE — 97112 NEUROMUSCULAR REEDUCATION: CPT | Performed by: PHYSICAL THERAPIST

## 2018-12-24 PROCEDURE — 97140 MANUAL THERAPY 1/> REGIONS: CPT | Performed by: PHYSICAL THERAPIST

## 2018-12-24 NOTE — PROGRESS NOTES
Daily Note     Today's date: 2018  Patient name: Sean Craig  : 1955  MRN: [id number]  Referring provider: Ulises Keane MD  Dx:   Encounter Diagnosis     ICD-10-CM    1  Balance problem R26 89    2  Presence of right artificial knee joint Z96 651    3  Acute pain of right knee M25 561                   Subjective:  Patient reports her knee and ankle are swollen this morning but the patient states she has not been icing at home because of decrease pain levels  Patient reports she will re-start the daily CP at home to assist with the swelling as per PT recommendations  Patient reports minimal soreness in the right knee at the start of the session with 1/10 level  Post session, the patient reports feeling good with only 1/10 soreness in the right knee         Objective: See treatment diary below    Precautions:  WBAT Right LE     Specialty Daily Treatment Diary     Manual         Bilat knee and LE stretching and mobs  15 min                                            Exercise Diary     Nu-step  10 min L5 10 min L5 10 min L5  10 min L5 10 min L5    Stand SLR all directions 2x10 Bilat  1 0#   On foam 2x10 Bilat   1 5#    NT 10x Bilat  2x10 Bilat  1 0#    Stand HR/TR On 4" step   2x10 Bilat  2x10 Bilat   4" step  2x10 Bilat  4" step  10X Bilat  4" step 10x Bilat   4" step    S/L Gastroc Stretch on step  10x10" Hold   Bilat  4" step  10x10" Hold  Bilat  4" step  10x10" Hold   Bilat  4" step  10x 10" hold  bilat   4" step 10x10" Hold   Bilat  4" step    Front and lateral step-up  2x10 Bilat   6" step  2x10 Bilat   6" step  10x Bilat   4" step  10x  4" step 10x Bilat  4" step    Step over  10X Bilat   6" step  2x10 Bilat  6" step  NT  10x bilat  4" step 10x Bilat  4" step    SLS and tandem on foam  Tandem walk on foam   4x10 Feet   SLS 3x15" Hold  NT  NT  NT   Side step and squat   4x10 Feet   Light Blue  NT  NT 6x10 Feet   Light Blue    Monster walk  4x10 Feet  Light Blue  NT  NT 6x10 Feet   Light Blue    Rocker board         High knee walk   4x10 Feet   2 0#  NT  NT  NT   Quad set         Heel slides with strap         Bridge with Tball   2x10 with Orange Tball NT   2x10 with   Orange Tball    SAQ         DKTC with Tball   2x10 5" hold  Orange Tball 2x10 5" Hold   Orange Tball  2x10 5" hold  Orange Tball    LTR with Tball   10x5" Hold   Orange Tball  2x10 5" Hold   Orange Tball  NT    [address] 5 min L3                            Modalities 12/24 12/12 12/17 12/19 12/21   MHP to Low back and bilateral knees  15 min   Used CP to R knee today 15 min with   MHP to the LB and left knee in seated  15 min MHP to the left knee and CP to the right knee  15 min MHP to the left knee and CP to the right knee  15 min MHP to the left knee and CP to the right knee                            Assessment: Tolerated treatment fair  PT notes the patient reported being fatigued/tired after the SLR on the foam so PT modified treatment but will continue to progress toward therapy goals as tolerated by patient  PT notes the patient with continuation of bilateral quad weakness with need continuation of skilled therapy  Plan: Continue per plan of care

## 2018-12-26 ENCOUNTER — OFFICE VISIT (OUTPATIENT)
Dept: PHYSICAL THERAPY | Facility: CLINIC | Age: 63
End: 2018-12-26
Payer: MEDICARE

## 2018-12-26 DIAGNOSIS — R26.89 BALANCE PROBLEM: Primary | ICD-10-CM

## 2018-12-26 DIAGNOSIS — Z96.651 PRESENCE OF RIGHT ARTIFICIAL KNEE JOINT: ICD-10-CM

## 2018-12-26 DIAGNOSIS — M25.561 ACUTE PAIN OF RIGHT KNEE: ICD-10-CM

## 2018-12-26 PROCEDURE — 97140 MANUAL THERAPY 1/> REGIONS: CPT | Performed by: PHYSICAL THERAPIST

## 2018-12-26 PROCEDURE — 97112 NEUROMUSCULAR REEDUCATION: CPT | Performed by: PHYSICAL THERAPIST

## 2018-12-26 PROCEDURE — 97110 THERAPEUTIC EXERCISES: CPT | Performed by: PHYSICAL THERAPIST

## 2018-12-26 NOTE — PROGRESS NOTES
Daily Note     Today's date: 2018  Patient name: Gleen Schilder  : 1955  MRN: [id number]  Referring provider: Preeti Cassidy MD  Dx:   Encounter Diagnosis     ICD-10-CM    1  Balance problem R26 89    2  Presence of right artificial knee joint Z96 651    3  Acute pain of right knee M25 561                   Subjective:  Patient reports the swelling is better after starting to use the ice at home  Patient reports 2/10 soreness in the right knee at the start of the session         Objective: See treatment diary below    Precautions:  WBAT Right LE     Specialty Daily Treatment Diary     Manual        Bilat knee and LE stretching and mobs  15 min  15 min                                           Exercise Diary     Nu-step  10 min L5 DC 10 min L5  10 min L5 10 min L5    Stand SLR all directions 2x10 Bilat  1 0#   On foam 2x10 Bilat   1 0#  On foam     NT 10x Bilat  2x10 Bilat  1 0#    Stand HR/TR On 4" step   2x10 Bilat  2x10 Bilat   4" step  2x10 Bilat  4" step  10X Bilat  4" step 10x Bilat   4" step    S/L Gastroc Stretch on step  10x10" Hold   Bilat  4" step  10x10" Hold  Bilat  4" step  10x10" Hold   Bilat  4" step  10x 10" hold  bilat   4" step 10x10" Hold   Bilat  4" step    Front and lateral step-up  2x10 Bilat   6" step  2x10 Bilat   6" step  10x Bilat   4" step  10x  4" step 10x Bilat  4" step    Step over  10X Bilat   6" step  2x10 Bilat  6" step  NT  10x bilat  4" step 10x Bilat  4" step    SLS and tandem on foam  Tandem walk on foam   4x10 Feet   4x10 Feet    And side step on foam  NT  NT  NT   Side step and squat   NT NT  NT 6x10 Feet   Light Blue    Monster walk  NT NT  NT 6x10 Feet   Light Blue    Rocker board         High knee walk   NT  NT  NT  NT   Quad set         Heel slides with strap         Bridge with Tball   NT NT   2x10 with   Orange Tball    SAQ         DKTC with Tball   NT  2x10 5" Hold   Orange Tball  2x10 5" hold  Orange Tball    LTR with Tball   NT  2x10 5" Hold   Orange Tball  NT    [address] [facility] 5 min L3  10 min L3                           Modalities 12/24 12/26 12/17 12/19 12/21   MHP to Low back and bilateral knees  15 min   Used CP to R knee today 15 min with   MHP to the LB and left knee in seated with CP to the right knee  15 min MHP to the left knee and CP to the right knee  15 min MHP to the left knee and CP to the right knee  15 min MHP to the left knee and CP to the right knee                            Assessment: Tolerated treatment fair  PT notes modified treatment secondary to fatigue in the LE after several standing and balance TE but PT will continue to progress toward therapy goals and full TE session as tolerated by patient  Plan: Continue per plan of care

## 2018-12-28 ENCOUNTER — OFFICE VISIT (OUTPATIENT)
Dept: PHYSICAL THERAPY | Facility: CLINIC | Age: 63
End: 2018-12-28
Payer: MEDICARE

## 2018-12-28 DIAGNOSIS — M25.561 ACUTE PAIN OF RIGHT KNEE: ICD-10-CM

## 2018-12-28 DIAGNOSIS — R26.89 BALANCE PROBLEM: Primary | ICD-10-CM

## 2018-12-28 DIAGNOSIS — Z96.651 PRESENCE OF RIGHT ARTIFICIAL KNEE JOINT: ICD-10-CM

## 2018-12-28 PROCEDURE — 97140 MANUAL THERAPY 1/> REGIONS: CPT | Performed by: PHYSICAL THERAPIST

## 2018-12-28 PROCEDURE — 97150 GROUP THERAPEUTIC PROCEDURES: CPT | Performed by: PHYSICAL THERAPIST

## 2018-12-28 PROCEDURE — 97112 NEUROMUSCULAR REEDUCATION: CPT | Performed by: PHYSICAL THERAPIST

## 2018-12-28 PROCEDURE — 97110 THERAPEUTIC EXERCISES: CPT | Performed by: PHYSICAL THERAPIST

## 2018-12-31 ENCOUNTER — TELEPHONE (OUTPATIENT)
Dept: UROLOGY | Facility: AMBULATORY SURGERY CENTER | Age: 63
End: 2018-12-31

## 2018-12-31 ENCOUNTER — TELEPHONE (OUTPATIENT)
Dept: UROLOGY | Facility: HOSPITAL | Age: 63
End: 2018-12-31

## 2018-12-31 DIAGNOSIS — N20.0 NEPHROLITHIASIS: Primary | ICD-10-CM

## 2018-12-31 NOTE — PRE-PROCEDURE INSTRUCTIONS
Pre-Surgery Instructions:   Medication Instructions    DULoxetine (CYMBALTA) 30 mg delayed release capsule Instructed patient per Anesthesia Guidelines   DULoxetine (CYMBALTA) 60 mg delayed release capsule Instructed patient per Anesthesia Guidelines   ergocalciferol (VITAMIN D2) 50,000 units Instructed patient per Anesthesia Guidelines   naproxen (NAPROSYN) 500 mg tablet Instructed patient per Anesthesia Guidelines   ondansetron (ZOFRAN) 4 mg tablet Instructed patient per Anesthesia Guidelines   tamsulosin (FLOMAX) 0 4 mg Instructed patient per Anesthesia Guidelines   traMADol (ULTRAM) 50 mg tablet Instructed patient per Anesthesia Guidelines   traZODone (DESYREL) 150 mg tablet Instructed patient per Anesthesia Guidelines  Spoke to pt  Medication list reviewed & instructed  As of 1 1 19 pt to stop vit D, naproxen, and her 'essential oils' at home  Pt stated she needed to stop oils prior to previous procedures  Instructed on tylenol or tramadol only  Am DOS pt to take cymbalta with 1-2 sips of water  Showering instructions given at time of call  All instructions verbally understood by patient  All questions answered  5HT3 Antagonists Med Class     Continue to take this medication on your normal schedule  If this is an oral medication and you take it in the morning, then you may take this medicine with a sip of water  Alpha adrenergic antagonist Med Class     Continue to take this medication on your normal schedule  If this is an oral medication and you take it in the morning, then you may take this medicine with a sip of water  Alpha-1 adrenergic blocker Med Class     Continue to take this medication on your normal schedule  If this is an oral medication and you take it in the morning, then you may take this medicine with a sip of water  Antidepressant Med Class     Continue to take this medication on your normal schedule    If this is an oral medication and you take it in the morning, then you may take this medicine with a sip of water  NSAID Med Class     Stop taking this medication at least 3 days prior to surgery/procedure  Opioid Med Class     Continue to take this medication on your normal schedule  If this is an oral medication and you take it in the morning, then you may take this medicine with a sip of water

## 2018-12-31 NOTE — TELEPHONE ENCOUNTER
Please arrange CT stone study, as patient thinks she may have passed her stone  This needs to be done prior to her operative date

## 2019-01-02 ENCOUNTER — EVALUATION (OUTPATIENT)
Dept: PHYSICAL THERAPY | Facility: CLINIC | Age: 64
End: 2019-01-02
Payer: MEDICARE

## 2019-01-02 DIAGNOSIS — R26.89 BALANCE PROBLEM: Primary | ICD-10-CM

## 2019-01-02 DIAGNOSIS — Z96.651 PRESENCE OF RIGHT ARTIFICIAL KNEE JOINT: ICD-10-CM

## 2019-01-02 DIAGNOSIS — M25.561 ACUTE PAIN OF RIGHT KNEE: ICD-10-CM

## 2019-01-02 PROCEDURE — 97112 NEUROMUSCULAR REEDUCATION: CPT | Performed by: PHYSICAL THERAPIST

## 2019-01-02 PROCEDURE — G8979 MOBILITY GOAL STATUS: HCPCS | Performed by: PHYSICAL THERAPIST

## 2019-01-02 PROCEDURE — 97150 GROUP THERAPEUTIC PROCEDURES: CPT | Performed by: PHYSICAL THERAPIST

## 2019-01-02 PROCEDURE — 97110 THERAPEUTIC EXERCISES: CPT | Performed by: PHYSICAL THERAPIST

## 2019-01-02 PROCEDURE — G8978 MOBILITY CURRENT STATUS: HCPCS | Performed by: PHYSICAL THERAPIST

## 2019-01-02 PROCEDURE — 97140 MANUAL THERAPY 1/> REGIONS: CPT | Performed by: PHYSICAL THERAPIST

## 2019-01-02 NOTE — LETTER
2019    [facility] [address] [facility]    Patient: Lisandro Mina   YOB: 1955   Date of Visit: 2019     Encounter Diagnosis     ICD-10-CM    1  Balance problem R26 89    2  Presence of right artificial knee joint Z96 651    3  Acute pain of right knee M25 561        Dear Dr Carlisle Ly:    Please review the attached Plan of Care from Genesis Ayala's recent visit  Please verify that you agree therapy should continue by signing the attached document and sending it back to our office  If you have any questions or concerns, please don't hesitate to call  Sincerely,    Tara Wolfe, PT      Referring Provider:      I certify that I have read the below Plan of Care and certify the need for these services furnished under this plan of treatment while under my care  Fang Barillas MD  [facility] 31: [phone]          PT Re-Evaluation     Today's date: 2019  Patient name: Lisandro Mina  : 1955  MRN: [id number]  Referring provider: Jyotsna Ortiz MD  Dx:   Encounter Diagnosis     ICD-10-CM    1  Balance problem R26 89    2  Presence of right artificial knee joint Z96 651    3   Acute pain of right knee M25 561                   Assessment  Assessment details: PT notes the patient with improved ROM but continuation of decrease strength t/o the right knee and LE with demonstration of antalgic gait pattern and balance s/p right TKR with continuation of right quad weakness leading to increase swelling, pain levels and functional limitations with need for continuation of skilled therapy for 4 weeks with focus on gait/balance, posture, strengthening, manual therapy, analgesic modalities, and update/review of HEP with progression to HEP/wellness program   PT notes secondary medical issues of fibromyalgia and kidney stones are prolonging course of skilled therapy and impairing prognosis  Impairments: abnormal gait, abnormal or restricted ROM, activity intolerance, impaired balance, impaired physical strength, lacks appropriate home exercise program and pain with function  Understanding of Dx/Px/POC: good   Prognosis: good    Goals  ST  Initiate HEP-MET  2  Decrease pain by 25-50%-MET   3  Increase ROM by 25-50%-MET  4  Increase strength by 1-2 mm grades-MET  5  Patient ambulate with SPC or no AD-MET   LT  Demonstration of improved gait pattern and balance with SPC or no AD-Partial MET   2  Decrease limitations with stairs, ADL, standing and walking-Partial MET   3  Improve ROM to 0-115+ in the right knee-Partial MET   4   DC with HEP-Progressing    Plan  Plan details: PT notes review of POC and findings with patient who is in agreement with PT recommendations of continuaton of skilled therapy  Patient would benefit from: PT eval and skilled physical therapy  Planned modality interventions: cryotherapy  Planned therapy interventions: manual therapy, neuromuscular re-education, patient education, postural training, self care, strengthening, stretching, therapeutic exercise, home exercise program, graded exercise, gait training, functional ROM exercises and flexibility  Frequency: 3x week  Duration in visits: 12  Duration in weeks: 4  Treatment plan discussed with: patient        Subjective Evaluation    History of Present Illness  Date of surgery: 10/24/2018  Mechanism of injury: surgery  Mechanism of injury: Patient reports dealing with bilateral knee pain for 20+ years from degenerative changes t/o the knee joints and fibromyalgia  Patient reports trials of skilled therapy and multiple injections in the knees with minimal change in status  Patient was found to be a candidate for joint replacement surgery  Patient underwent the right TKR surgery on 10/24/18 and was placed in rehab for 5 days  Patient recently released from acute rehab with orders for OPPT    PT notes significant PMH of depression, fibromyalgia, and anxiety  Presently the patient has attended 24 sessions of skilled therapy and feels 75-80% improvement since the start of therapy with overall decrease intensity and duration pain levels and increase strength in the right leg  Patient reports progression to no AD with ambulation but continuation of increase fibromyalgia symptoms on occasion which patient reports causes weakness in the legs with need to use SPC with ambulation  Patient reports she feels she needs more therapy to get stronger in the leg and get the knee moving more as well as improve walking  Patient reports her fibromyalgia is effecting her recovery and slowing the process  Patient also reports suffering from kidney stones presently which is causing increase pain levels in the low back and slowing progress of therapy  Pain  Current pain ratin  At best pain ratin  At worst pain rating: 3  Location: Right knee   Quality: sharp and throbbing  Relieving factors: ice and rest  Aggravating factors: standing, walking and stair climbing  Progression: improved      Diagnostic Tests  X-ray: abnormal  Treatments  Previous treatment: physical therapy, injection treatment and medication  Current treatment: medication and physical therapy  Discharged from (in last 30 days): inpatient hospitalization  Patient Goals  Patient goals for therapy: decreased pain, decreased edema, improved balance, increased motion, increased strength and independence with ADLs/IADLs          Objective     Palpation     Right Tenderness of the adductor longus, iliopsoas and rectus femoris       Active Range of Motion     Right Hip   Flexion: 75 degrees   Abduction: WFL    Right Knee   Flexion: 107 degrees with pain  Extension: -7 degrees with pain    Right Ankle/Foot   Normal active range of motion    Additional Active Range of Motion Details  PT notes the patient with improved ROM and strength t/o the right knee and LE but continuation of right quad weakness     Passive Range of Motion     Right Hip   Flexion: 81 degrees   Abduction: WFL    Right Knee   Flexion: 110 degrees with pain  Extension: -3 degrees with pain    Mobility   Patellar Mobility:     Right Knee   WFL: medial, lateral, superior and inferior    Patellar Static Positioning   Right Knee: WFL    Strength/Myotome Testing     Right Hip   Planes of Motion   Flexion: 4  Extension: 5  Abduction: 5  Adduction: 5  External rotation: 4+  Internal rotation: 4+    Right Knee   Flexion: 4+  Extension: 4    Right Ankle/Foot   Dorsiflexion: 4+  Plantar flexion: 5  Inversion: 5  Eversion: 4+    Swelling     Left Knee Girth Measurement (cm)   Joint line: 43 cm    Right Knee Girth Measurement (cm)   Joint line: 45 cm    Ambulation   Weight-Bearing Status   Weight-Bearing Status (Left): weight-bearing as tolerated   Weight-Bearing Status (Right): weight-bearing as tolerated      Observational Gait   Gait: antalgic   Stride length within functional limits  Decreased walking speed  Additional Observational Gait Details  PT notes the patient with demonstration of antalgic gait with decrease stance on the right, decrease tibial advancement on the right, and decrease shandra with rating of good with static and dynamic activities  PT notes the patient is independent with ambulation without AD         Flowsheet Rows      Most Recent Value   PT/OT G-Codes   FOTO information reviewed  Yes   Assessment Type  Re-evaluation   G code set  Mobility: Walking & Moving Around   Mobility: Walking and Moving Around Current Status ()  CK   Mobility: Walking and Moving Around Goal Status ()  CJ          Precautions:  WBAT Right LE     Specialty Daily Treatment Diary     Manual  12/24 12/26 12/28 1/2    Bilat knee and LE stretching and mobs  15 min  15 min  15 min  15 min                                         Exercise Diary  12/24 12/26 12/28 1/2    Nu-step  10 min L5 DC 5 min L5 DC Stand SLR all directions 2x10 Bilat  1 0#   On foam 2x10 Bilat   1 0#  On foam     NT 2x10 Bilat   1 0#     Stand HR/TR On 4" step   2x10 Bilat  2x10 Bilat   4" step  2x10 Bilat  4" step  2x10 Bilat  4" step    S/L Gastroc Stretch on step  10x10" Hold   Bilat  4" step  10x10" Hold  Bilat  4" step  10x10" Hold   Bilat  4" step  10x 10" hold  bilat   4" step    Front and lateral step-up  2x10 Bilat   6" step  10X Bilat   6" step  10x Bilat   6" step  10x  6" step    Step over  10X Bilat   6" step  10X Bilat  6" step  10X Bilat   6" step  10x bilat  6" step    SLS and tandem on foam  Tandem walk on foam   4x10 Feet   4x10 Feet    And side step on foam  6x10 Feet  And side step on foam   6x10 Feet and side step on foam     Side step and squat   NT  6x10 Feet   Light Blue  6x10 Feet   Green     Monster walk  NT 6x10 Feet   Light Blue  6x10 Feet   Green     Step downs    10x Bilat  6" step  NT     High knee walk   NT  NT  NT     Quad set         Heel slides with strap         Bridge with Tball   NT 10x   Orange Tball 2x10 with   Orange Tball    SAQ         DKTC with Tball   NT  10x   Orange Tball 2x10 5" hold  Orange Tball    LTR with Tball   NT  10X   Orange Tball  10x Bilat   [place] Tball     [address] [facility] 5 min L3  10 min L3  10 min L3  10 min L3                         Modalities 12/24 12/26 12/28 1/2    MHP to Low back and bilateral knees  15 min   Used CP to R knee today 15 min with   MHP to the LB and left knee in seated with CP to the right knee  15 min MHP to the left knee and CP to the right knee  15 min MHP to the left knee and CP to the right knee

## 2019-01-02 NOTE — PROGRESS NOTES
PT Re-Evaluation     Today's date: 2019  Patient name: Vasiliy Garcia  : 1955  MRN: [id number]  Referring provider: Neetu Vo MD  Dx:   Encounter Diagnosis     ICD-10-CM    1  Balance problem R26 89    2  Presence of right artificial knee joint Z96 651    3  Acute pain of right knee M25 561                   Assessment  Assessment details: PT notes the patient with improved ROM but continuation of decrease strength t/o the right knee and LE with demonstration of antalgic gait pattern and balance s/p right TKR with continuation of right quad weakness leading to increase swelling, pain levels and functional limitations with need for continuation of skilled therapy for 4 weeks with focus on gait/balance, posture, strengthening, manual therapy, analgesic modalities, and update/review of HEP with progression to HEP/wellness program   PT notes secondary medical issues of fibromyalgia and kidney stones are prolonging course of skilled therapy and impairing prognosis  Impairments: abnormal gait, abnormal or restricted ROM, activity intolerance, impaired balance, impaired physical strength, lacks appropriate home exercise program and pain with function  Understanding of Dx/Px/POC: good   Prognosis: good    Goals  ST  Initiate HEP-MET  2  Decrease pain by 25-50%-MET   3  Increase ROM by 25-50%-MET  4  Increase strength by 1-2 mm grades-MET  5  Patient ambulate with SPC or no AD-MET   LT  Demonstration of improved gait pattern and balance with SPC or no AD-Partial MET   2  Decrease limitations with stairs, ADL, standing and walking-Partial MET   3  Improve ROM to 0-115+ in the right knee-Partial MET   4   DC with HEP-Progressing    Plan  Plan details: PT notes review of POC and findings with patient who is in agreement with PT recommendations of continuaton of skilled therapy     Patient would benefit from: PT eval and skilled physical therapy  Planned modality interventions: cryotherapy  Planned therapy interventions: manual therapy, neuromuscular re-education, patient education, postural training, self care, strengthening, stretching, therapeutic exercise, home exercise program, graded exercise, gait training, functional ROM exercises and flexibility  Frequency: 3x week  Duration in visits: 12  Duration in weeks: 4  Treatment plan discussed with: patient        Subjective Evaluation    History of Present Illness  Date of surgery: 10/24/2018  Mechanism of injury: surgery  Mechanism of injury: Patient reports dealing with bilateral knee pain for 20+ years from degenerative changes t/o the knee joints and fibromyalgia  Patient reports trials of skilled therapy and multiple injections in the knees with minimal change in status  Patient was found to be a candidate for joint replacement surgery  Patient underwent the right TKR surgery on 10/24/18 and was placed in rehab for 5 days  Patient recently released from acute rehab with orders for OPPT  PT notes significant PMH of depression, fibromyalgia, and anxiety  Presently the patient has attended 24 sessions of skilled therapy and feels 75-80% improvement since the start of therapy with overall decrease intensity and duration pain levels and increase strength in the right leg  Patient reports progression to no AD with ambulation but continuation of increase fibromyalgia symptoms on occasion which patient reports causes weakness in the legs with need to use SPC with ambulation  Patient reports she feels she needs more therapy to get stronger in the leg and get the knee moving more as well as improve walking  Patient reports her fibromyalgia is effecting her recovery and slowing the process  Patient also reports suffering from kidney stones presently which is causing increase pain levels in the low back and slowing progress of therapy      Pain  Current pain ratin  At best pain ratin  At worst pain rating: 3  Location: Right knee   Quality: sharp and throbbing  Relieving factors: ice and rest  Aggravating factors: standing, walking and stair climbing  Progression: improved      Diagnostic Tests  X-ray: abnormal  Treatments  Previous treatment: physical therapy, injection treatment and medication  Current treatment: medication and physical therapy  Discharged from (in last 30 days): inpatient hospitalization  Patient Goals  Patient goals for therapy: decreased pain, decreased edema, improved balance, increased motion, increased strength and independence with ADLs/IADLs          Objective     Palpation     Right Tenderness of the adductor longus, iliopsoas and rectus femoris       Active Range of Motion     Right Hip   Flexion: 75 degrees   Abduction: WFL    Right Knee   Flexion: 107 degrees with pain  Extension: -7 degrees with pain    Right Ankle/Foot   Normal active range of motion    Additional Active Range of Motion Details  PT notes the patient with improved ROM and strength t/o the right knee and LE but continuation of right quad weakness     Passive Range of Motion     Right Hip   Flexion: 81 degrees   Abduction: WFL    Right Knee   Flexion: 110 degrees with pain  Extension: -3 degrees with pain    Mobility   Patellar Mobility:     Right Knee   WFL: medial, lateral, superior and inferior    Patellar Static Positioning   Right Knee: WFL    Strength/Myotome Testing     Right Hip   Planes of Motion   Flexion: 4  Extension: 5  Abduction: 5  Adduction: 5  External rotation: 4+  Internal rotation: 4+    Right Knee   Flexion: 4+  Extension: 4    Right Ankle/Foot   Dorsiflexion: 4+  Plantar flexion: 5  Inversion: 5  Eversion: 4+    Swelling     Left Knee Girth Measurement (cm)   Joint line: 43 cm    Right Knee Girth Measurement (cm)   Joint line: 45 cm    Ambulation   Weight-Bearing Status   Weight-Bearing Status (Left): weight-bearing as tolerated   Weight-Bearing Status (Right): weight-bearing as tolerated      Observational Gait   Gait: antalgic   Stride length within functional limits  Decreased walking speed  Additional Observational Gait Details  PT notes the patient with demonstration of antalgic gait with decrease stance on the right, decrease tibial advancement on the right, and decrease shandra with rating of good with static and dynamic activities  PT notes the patient is independent with ambulation without AD         Flowsheet Rows      Most Recent Value   PT/OT G-Codes   FOTO information reviewed  Yes   Assessment Type  Re-evaluation   G code set  Mobility: Walking & Moving Around   Mobility: Walking and Moving Around Current Status ()  CK   Mobility: Walking and Moving Around Goal Status ()  CJ          Precautions:  WBAT Right LE     Specialty Daily Treatment Diary     Manual  12/24 12/26 12/28 1/2    Bilat knee and LE stretching and mobs  15 min  15 min  15 min  15 min                                         Exercise Diary  12/24 12/26 12/28 1/2    Nu-step  10 min L5 DC 5 min L5 DC     Stand SLR all directions 2x10 Bilat  1 0#   On foam 2x10 Bilat   1 0#  On foam     NT 2x10 Bilat   1 0#     Stand HR/TR On 4" step   2x10 Bilat  2x10 Bilat   4" step  2x10 Bilat  4" step  2x10 Bilat  4" step    S/L Gastroc Stretch on step  10x10" Hold   Bilat  4" step  10x10" Hold  Bilat  4" step  10x10" Hold   Bilat  4" step  10x 10" hold  bilat   4" step    Front and lateral step-up  2x10 Bilat   6" step  10X Bilat   6" step  10x Bilat   6" step  10x  6" step    Step over  10X Bilat   6" step  10X Bilat  6" step  10X Bilat   6" step  10x bilat  6" step    SLS and tandem on foam  Tandem walk on foam   4x10 Feet   4x10 Feet    And side step on foam  6x10 Feet  And side step on foam   6x10 Feet and side step on foam     Side step and squat   NT  6x10 Feet   Light Blue  6x10 Feet   Green     Monster walk  NT 6x10 Feet   Light Blue  6x10 Feet   Green     Step downs    10x Bilat  6" step  NT     High knee walk   NT  NT  NT     Quad set Heel slides with strap         Bridge with Tball   NT 10x   Orange Tball 2x10 with   Orange Tball    SAQ         DKTC with Tball   NT  10x   Orange Tball 2x10 5" hold  Orange Tball    LTR with Tball   NT  10X   Orange Tball  10x Bilat   [place] Tball     [address] [facility] 5 min L3  10 min L3  10 min L3  10 min L3                         Modalities 12/24 12/26 12/28 1/2    MHP to Low back and bilateral knees  15 min   Used CP to R knee today 15 min with   MHP to the LB and left knee in seated with CP to the right knee  15 min MHP to the left knee and CP to the right knee  15 min MHP to the left knee and CP to the right knee

## 2019-01-03 ENCOUNTER — HOSPITAL ENCOUNTER (OUTPATIENT)
Dept: CT IMAGING | Facility: HOSPITAL | Age: 64
Discharge: HOME | End: 2019-01-03
Payer: MEDICARE

## 2019-01-03 DIAGNOSIS — N20.0 NEPHROLITHIASIS: ICD-10-CM

## 2019-01-03 PROCEDURE — 74176 CT ABD & PELVIS W/O CONTRAST: CPT

## 2019-01-04 ENCOUNTER — APPOINTMENT (OUTPATIENT)
Dept: PHYSICAL THERAPY | Facility: CLINIC | Age: 64
End: 2019-01-04
Payer: MEDICARE

## 2019-01-04 ENCOUNTER — TELEPHONE (OUTPATIENT)
Dept: UROLOGY | Facility: CLINIC | Age: 64
End: 2019-01-04

## 2019-01-04 NOTE — TELEPHONE ENCOUNTER
----- Message from Dhiraj Stout PA-C sent at 1/4/2019  1:55 PM EST -----  Patient has surgery 1/8, CT obtain as she believed she passed her stone however CT reveals her stone is still present  It has migrated to the UVJ so I recommend patient strain her urine with every void

## 2019-01-04 NOTE — TELEPHONE ENCOUNTER
Spoke with patient  Instructed patient on CT results  Patient reports she has been straining her urine  Advised to continue to strain with each void  Patient verbalized understanding  Patient knows to call office if she does see stone  Instructed patient, as of now, surgery to remain as scheduled, will discuss with Dr Nanette Alfredo  If plans change, will call patient back regarding  Patient agreeable to plan

## 2019-01-07 ENCOUNTER — TELEPHONE (OUTPATIENT)
Dept: UROLOGY | Facility: MEDICAL CENTER | Age: 64
End: 2019-01-07

## 2019-01-07 ENCOUNTER — OFFICE VISIT (OUTPATIENT)
Dept: PHYSICAL THERAPY | Facility: CLINIC | Age: 64
End: 2019-01-07
Payer: MEDICARE

## 2019-01-07 ENCOUNTER — HOSPITAL ENCOUNTER (OUTPATIENT)
Dept: RADIOLOGY | Facility: HOSPITAL | Age: 64
Discharge: HOME | End: 2019-01-07
Payer: MEDICARE

## 2019-01-07 DIAGNOSIS — R26.89 BALANCE PROBLEM: Primary | ICD-10-CM

## 2019-01-07 DIAGNOSIS — M25.561 ACUTE PAIN OF RIGHT KNEE: ICD-10-CM

## 2019-01-07 DIAGNOSIS — Z96.651 PRESENCE OF RIGHT ARTIFICIAL KNEE JOINT: ICD-10-CM

## 2019-01-07 DIAGNOSIS — N20.0 NEPHROLITHIASIS: Primary | ICD-10-CM

## 2019-01-07 DIAGNOSIS — N20.0 NEPHROLITHIASIS: ICD-10-CM

## 2019-01-07 PROCEDURE — 97140 MANUAL THERAPY 1/> REGIONS: CPT | Performed by: PHYSICAL THERAPIST

## 2019-01-07 PROCEDURE — 74018 RADEX ABDOMEN 1 VIEW: CPT

## 2019-01-07 PROCEDURE — 97150 GROUP THERAPEUTIC PROCEDURES: CPT | Performed by: PHYSICAL THERAPIST

## 2019-01-07 PROCEDURE — 97112 NEUROMUSCULAR REEDUCATION: CPT | Performed by: PHYSICAL THERAPIST

## 2019-01-07 PROCEDURE — 97110 THERAPEUTIC EXERCISES: CPT | Performed by: PHYSICAL THERAPIST

## 2019-01-07 NOTE — PROGRESS NOTES
Daily Note     Today's date: 2019  Patient name: Glo Larose  : 1955  MRN: [id number]  Referring provider: Becky Shields MD  Dx:   Encounter Diagnosis     ICD-10-CM    1  Balance problem R26 89    2  Presence of right artificial knee joint Z96 651    3  Acute pain of right knee M25 561                   Subjective: Patient reports she passed her kidney stone over the weekend which the patient is happy she won't have to undergo surgery for removal   Patient reports the right knee is sore this morning with difficulty with walking with pain levels of 3/10 and post session the patient reports increase ease with walking with pain levels of 2/10          Objective: See treatment diary below    Precautions:  WBAT Right LE     Specialty Daily Treatment Diary     Manual     Bilat knee and LE stretching and mobs  15 min  15 min  15 min  15 min  15 min                                        Exercise Diary     Nu-step  10 min L5 DC 5 min L5 DC  DC   Stand SLR all directions 2x10 Bilat  1 0#   On foam 2x10 Bilat   1 0#  On foam     NT 2x10 Bilat   1 0#  2x10 Bilat  1 0#    Stand HR/TR On 4" step   2x10 Bilat  2x10 Bilat   4" step  2x10 Bilat  4" step  2x10 Bilat  4" step 2x10 Bilat  4" step    S/L Gastroc Stretch on step  10x10" Hold   Bilat  4" step  10x10" Hold  Bilat  4" step  10x10" Hold   Bilat  4" step  10x 10" hold  bilat   4" step 10x10" Hold   Bilat   4" step    Front and lateral step-up  2x10 Bilat   6" step  10X Bilat   6" step  10x Bilat   6" step  10x  6" step NT   Step over  10X Bilat   6" step  10X Bilat  6" step  10X Bilat   6" step  10x bilat  6" step 2x10 Bilat  6" step    SLS and tandem on foam  Tandem walk on foam   4x10 Feet   4x10 Feet    And side step on foam  6x10 Feet  And side step on foam   6x10 Feet and side step on foam  6x10 Feet Each    Side step and squat   NT  6x10 Feet   Light Blue  6x10 Feet   Green  6x10 Feet Vania Brothers walk  NT 6x10 Feet   Light Blue  6x10 Feet   Green  6x10 Feet   Green    Step downs    10x Bilat  6" step  NT  10x Bilat   6" step    High knee walk   NT  NT  NT  6x10 Feet   1 0#    Quad set         Heel slides with strap         Bridge with Tball   NT 10x   Orange Tball 2x10 with   Orange Tball 2x10 with   Orange Tball    SAQ         DKTC with Tball   NT  10x   Orange Tball 2x10 5" hold  Orange Tball 2x10 5" Hold   Orange Tball   LTR with Tball   NT  10X   Orange Tball  10x Bilat   Orange Tball  2x10 Bilat   [place] Tball    SRC 5 min L3  10 min L3  10 min L3  10 min L3  10 min L3                        Modalities 12/24 12/26 12/28 1/2 1/7   MHP to Low back and bilateral knees  15 min   Used CP to R knee today 15 min with   MHP to the LB and left knee in seated with CP to the right knee  15 min MHP to the left knee and CP to the right knee  15 min MHP to the left knee and CP to the right knee  15 min MHP to the left knee and LB with CP to the right in knee in seated                            Assessment: Tolerated treatment well  PT notes continuation of progression of TE program with focus on gait/balance and manual therapy to decrease pain levels and improve functional limitations to meet therapy goals  Plan: Continue per plan of care

## 2019-01-07 NOTE — TELEPHONE ENCOUNTER
Patient called to advise she passed her stone  Patient is scheduled for surgery tomorrow  Patient requesting to take stone to lab but needs order    Please call patient back at [phone]

## 2019-01-07 NOTE — TELEPHONE ENCOUNTER
Please advise if patient should have KUB done   Should she still have surgery tomorrow with Dr Lani Isaac?

## 2019-01-07 NOTE — TELEPHONE ENCOUNTER
Patient called office this morning, stating she passed calculus  She is tentatively scheduled for URS with Dr Arvind Isabel on 1/8/19  She was instructed to go for KUB

## 2019-01-08 DIAGNOSIS — N20.0 NEPHROLITHIASIS: Primary | ICD-10-CM

## 2019-01-08 DIAGNOSIS — N20.0 KIDNEY STONES: Primary | ICD-10-CM

## 2019-01-08 PROCEDURE — 82360 CALCULUS ASSAY QUANT: CPT | Performed by: UROLOGY

## 2019-01-11 ENCOUNTER — OFFICE VISIT (OUTPATIENT)
Dept: PHYSICAL THERAPY | Facility: CLINIC | Age: 64
End: 2019-01-11
Payer: MEDICARE

## 2019-01-11 DIAGNOSIS — M25.561 ACUTE PAIN OF RIGHT KNEE: ICD-10-CM

## 2019-01-11 DIAGNOSIS — R26.89 BALANCE PROBLEM: Primary | ICD-10-CM

## 2019-01-11 DIAGNOSIS — Z96.651 PRESENCE OF RIGHT ARTIFICIAL KNEE JOINT: ICD-10-CM

## 2019-01-11 PROCEDURE — 97140 MANUAL THERAPY 1/> REGIONS: CPT | Performed by: PHYSICAL THERAPIST

## 2019-01-11 PROCEDURE — 97112 NEUROMUSCULAR REEDUCATION: CPT | Performed by: PHYSICAL THERAPIST

## 2019-01-11 PROCEDURE — 97110 THERAPEUTIC EXERCISES: CPT | Performed by: PHYSICAL THERAPIST

## 2019-01-11 PROCEDURE — 97150 GROUP THERAPEUTIC PROCEDURES: CPT | Performed by: PHYSICAL THERAPIST

## 2019-01-11 NOTE — PROGRESS NOTES
Daily Note     Today's date: 2019  Patient name: Avery Meraz  : 1955  MRN: [id number]  Referring provider: Castro Zurita MD  Dx:   Encounter Diagnosis     ICD-10-CM    1  Balance problem R26 89    2  Presence of right artificial knee joint Z96 651    3  Acute pain of right knee M25 561                   Subjective:  Patient reports she received a massage last night and the patient reports several notes were worked out which is helping the fibromyalgia  Patient reports 1/10 soreness in the right knees at the start of the session  Post session, the patient reports 1/10 soreness in the right knee but overall increase ease with walking          Objective: See treatment diary below    Precautions:  WBAT Right LE     Specialty Daily Treatment Diary     Manual         Bilat knee and LE stretching and mobs  15 min                                            Exercise Diary     Nu-step  DC  DC 5 min L5 DC  DC   Stand SLR all directions 2x10 Bilat  1 5#   On foam 2x10 Bilat   1 0#  On foam     NT 2x10 Bilat   1 0#  2x10 Bilat  1 0#    Stand HR/TR On 4" step   2x10 Bilat  2x10 Bilat   4" step  2x10 Bilat  4" step  2x10 Bilat  4" step 2x10 Bilat  4" step    S/L Gastroc Stretch on step  10x10" Hold   Bilat  4" step  10x10" Hold  Bilat  4" step  10x10" Hold   Bilat  4" step  10x 10" hold  bilat   4" step 10x10" Hold   Bilat   4" step    Step up and over  2x10 Bilat   6" step   10x Bilat   6" step  10x  6" step NT   Step Downs 10X Bilat   6" step  10X Bilat  6" step  10X Bilat   6" step  10x bilat  6" step 2x10 Bilat  6" step    SLS and tandem on foam  Tandem walk on foam   4x10 Feet   4x10 Feet    And side step on foam  6x10 Feet  And side step on foam   6x10 Feet and side step on foam  6x10 Feet Each    Side step and squat  6x10 Feet  Green  NT  6x10 Feet   Light Blue  6x10 Feet   Green  6x10 Feet Green    Monster walk 6x10 Feet   Green  NT 6x10 Feet   Light Blue  6x10 Feet   Green 6x10 Feet   Green    Step downs    10x Bilat  6" step  NT  10x Bilat   6" step    High knee walk  6x10 Feet  1 5#  NT  NT  NT  6x10 Feet   1 0#    Quad set         Heel slides with strap         Bridge with Tball   NT 10x   Orange Tball 2x10 with   Orange Tball 2x10 with   Orange Tball    SAQ         DKTC with Tball   NT  10x   Orange Tball 2x10 5" hold  Orange Tball 2x10 5" Hold   Orange Tball   LTR with Tball   NT  10X   Orange Tball  10x Bilat   Orange Tball  2x10 Bilat   [place] Tball    SRC 10 min L3  10 min L3  10 min L3  10 min L3  10 min L3                        Modalities 1/11       MHP to Low back and bilateral knees  15 min   With CP to the right knee today                                 Assessment: Tolerated treatment well  PT notes continuation of progression of TE program with focus on gait/balance and manual therapy to decrease pain levels and improve functional functional limitations to meet therapy goals  Plan: Continue per plan of care

## 2019-01-14 ENCOUNTER — OFFICE VISIT (OUTPATIENT)
Dept: PHYSICAL THERAPY | Facility: CLINIC | Age: 64
End: 2019-01-14
Payer: MEDICARE

## 2019-01-14 DIAGNOSIS — M25.561 ACUTE PAIN OF RIGHT KNEE: ICD-10-CM

## 2019-01-14 DIAGNOSIS — Z96.651 PRESENCE OF RIGHT ARTIFICIAL KNEE JOINT: ICD-10-CM

## 2019-01-14 DIAGNOSIS — R26.89 BALANCE PROBLEM: Primary | ICD-10-CM

## 2019-01-14 LAB
CA PHOS MFR STONE: 5 %
CALCIUM OXALATE DIHYDRATE MFR STONE IR: 10 %
COLOR STONE: NORMAL
COM MFR STONE: 85 %
COMMENT-STONE3: NORMAL
COMPOSITION: NORMAL
LABORATORY COMMENT REPORT: NORMAL
NIDUS STONE QL: NORMAL
PHOTO: NORMAL
SIZE STONE: NORMAL MM
STONE ANALYSIS-IMP: NORMAL
SURFACE CRYSTALS: NORMAL
WT STONE: 71.8 MG

## 2019-01-14 PROCEDURE — 97112 NEUROMUSCULAR REEDUCATION: CPT | Performed by: PHYSICAL THERAPIST

## 2019-01-14 PROCEDURE — 97110 THERAPEUTIC EXERCISES: CPT | Performed by: PHYSICAL THERAPIST

## 2019-01-14 PROCEDURE — 97150 GROUP THERAPEUTIC PROCEDURES: CPT | Performed by: PHYSICAL THERAPIST

## 2019-01-14 PROCEDURE — 97140 MANUAL THERAPY 1/> REGIONS: CPT | Performed by: PHYSICAL THERAPIST

## 2019-01-14 NOTE — PROGRESS NOTES
Daily Note     Today's date: 2019  Patient name: Glo Larose  : 1955  MRN: [id number]  Referring provider: Becky Shields MD  Dx:   Encounter Diagnosis     ICD-10-CM    1  Balance problem R26 89    2  Acute pain of right knee M25 561    3  Presence of right artificial knee joint Z96 651                   Subjective:  Patient reports the left knee has been bothering her more than the right  Patient reports the fibromyalgia and arthritis are acting up in the left knee  Patient reports 2/10 pain levels in the bilateral knees at the start of the session and 1/10 soreness post session          Objective: See treatment diary below    Precautions:  WBAT Right LE     Specialty Daily Treatment Diary     Manual        Bilat knee and LE stretching and mobs  15 min  15 min                                           Exercise Diary     Nu-step  DC  DC 5 min L5 DC  DC   Stand SLR all directions 2x10 Bilat  1 5#   On foam 2x10 Bilat   2 0#  On foam     NT 2x10 Bilat   1 0#  2x10 Bilat  1 0#    Stand HR/TR On 4" step   2x10 Bilat  2x10 Bilat   4" step  2x10 Bilat  4" step  2x10 Bilat  4" step 2x10 Bilat  4" step    S/L Gastroc Stretch on step  10x10" Hold   Bilat  4" step  10x10" Hold  Bilat  4" step  10x10" Hold   Bilat  4" step  10x 10" hold  bilat   4" step 10x10" Hold   Bilat   4" step    Step up and over  2x10 Bilat   6" step  2x10 Bilat  6" step  10x Bilat   6" step  10x  6" step NT   Step Downs 10X Bilat   6" step  10X Bilat  6" step  10X Bilat   6" step  10x bilat  6" step 2x10 Bilat  6" step    SLS and tandem on foam  Tandem walk on foam   4x10 Feet   4x10 Feet    And side step on foam  6x10 Feet  And side step on foam   6x10 Feet and side step on foam  6x10 Feet Each    Side step and squat  6x10 Feet  Green  6x10 Feet   Blue  6x10 Feet   Light Blue  6x10 Feet   Green  6x10 Feet Green    Monster walk 6x10 Feet   Green  6x10 Feet  Blue  6x10 Feet   Light Blue  6x10 Feet   Green 6x10 Feet   Green    Step downs    10x Bilat  6" step  NT  10x Bilat   6" step    High knee walk  6x10 Feet  1 5#  6x10 Feet  2 0#  NT  NT  6x10 Feet   1 0#    Quad set         Heel slides with strap         Bridge with Tball   NT 10x   Orange Tball 2x10 with   Orange Tball 2x10 with   Orange Tball    SAQ         DKTC with Tball   NT  10x   Orange Tball 2x10 5" hold  Orange Tball 2x10 5" Hold   Orange Tball   LTR with Tball   NT  10X   Orange Tball  10x Bilat   Orange Tball  2x10 Bilat   [place] Tball    SRC 10 min L3  10 min L3  10 min L3  10 min L3  10 min L3                        Modalities 1/11 1/14      MHP to Low back and bilateral knees  15 min   With CP to the right knee today 15 min with CP to the right knee                               Assessment: Tolerated treatment well  PT notes continuation of progression of TE program with focus on gait/balance and manual therapy to decrease pain levels and improve functional limitations to meet therapy goals  Plan: Continue per plan of care

## 2019-01-16 ENCOUNTER — OFFICE VISIT (OUTPATIENT)
Dept: PHYSICAL THERAPY | Facility: CLINIC | Age: 64
End: 2019-01-16
Payer: MEDICARE

## 2019-01-16 DIAGNOSIS — R26.89 BALANCE PROBLEM: Primary | ICD-10-CM

## 2019-01-16 DIAGNOSIS — Z96.651 PRESENCE OF RIGHT ARTIFICIAL KNEE JOINT: ICD-10-CM

## 2019-01-16 DIAGNOSIS — M25.561 ACUTE PAIN OF RIGHT KNEE: ICD-10-CM

## 2019-01-16 PROCEDURE — 97112 NEUROMUSCULAR REEDUCATION: CPT | Performed by: PHYSICAL THERAPIST

## 2019-01-16 PROCEDURE — 97140 MANUAL THERAPY 1/> REGIONS: CPT | Performed by: PHYSICAL THERAPIST

## 2019-01-16 PROCEDURE — 97110 THERAPEUTIC EXERCISES: CPT | Performed by: PHYSICAL THERAPIST

## 2019-01-16 NOTE — PROGRESS NOTES
Daily Note     Today's date: 2019  Patient name: Nael Ortiz  : 1955  MRN: [id number]  Referring provider: Chapin Varghese MD  Dx:   Encounter Diagnosis     ICD-10-CM    1  Balance problem R26 89    2  Acute pain of right knee M25 561    3  Presence of right artificial knee joint Z96 651                   Subjective:  Patient reports the left knee is bothering her more than the right with the fibromyalgia  Patient reports 2/10 pain levels in the knees at the start of the session         Objective: See treatment diary below    Precautions:  WBAT Right LE     Specialty Daily Treatment Diary     Manual       Bilat knee and LE stretching and mobs  15 min  15 min  15 min                                          Exercise Diary     Nu-step  DC  DC DC DC  DC   Stand SLR all directions 2x10 Bilat  1 5#   On foam 2x10 Bilat   2 0#  On foam     2x10 Bilat  2 0#  On Foam  2x10 Bilat   1 0#  2x10 Bilat  1 0#    Stand HR/TR On 4" step   2x10 Bilat  2x10 Bilat   4" step  2x10 Bilat  4" step  2x10 Bilat  4" step 2x10 Bilat  4" step    S/L Gastroc Stretch on step  10x10" Hold   Bilat  4" step  10x10" Hold  Bilat  4" step  10x10" Hold   Bilat  4" step  10x 10" hold  bilat   4" step 10x10" Hold   Bilat   4" step    Step up and over  2x10 Bilat   6" step  2x10 Bilat  6" step  2x10 Bilat   6" step  10x  6" step NT   Step Downs 10X Bilat   6" step  10X Bilat  6" step  10X Bilat   6" step  10x bilat  6" step 2x10 Bilat  6" step    SLS and tandem on foam  Tandem walk on foam   4x10 Feet   4x10 Feet    And side step on foam  6x10 Feet  And side step on foam   6x10 Feet and side step on foam  6x10 Feet Each    Side step and squat  6x10 Feet  Green  6x10 Feet   Blue  6x10 Feet   Blue  6x10 Feet   Green  6x10 Feet Green    Monster walk 6x10 Feet   Green  6x10 Feet  Blue  6x10 Feet   Blue  6x10 Feet   Green  6x10 Feet   Green    Step downs     NT  10x Bilat   6" step    High knee walk  6x10 Feet  1 5#  6x10 Feet  2 0#  6x10 Feet  2 0# NT  6x10 Feet   1 0#    Quad set         Heel slides with strap         Bridge with Tball   NT NT 2x10 with   Orange Tball 2x10 with   Orange Tball    SAQ         DKTC with Tball   NT  NT  2x10 5" hold  Orange Tball 2x10 5" Hold   Orange Tball   LTR with Tball   NT 2x10   Orange Tball  10x Bilat   Orange Tball  2x10 Bilat   [place] Tball    SRC 10 min L3  10 min L3  10 min L3  10 min L3  10 min L3                        Modalities 1/11 1/14 1/16      MHP to Low back and bilateral knees  15 min   With CP to the right knee today 15 min with CP to the right knee  15 min with CP to the right knee                              Assessment: Tolerated treatment well  PT notes continuation of progression of TE program with focus on gait/balance and manual therapy to decrease pain levels and improve functional limitations to meet therapy goals  PT notes the patient with difficulty with step downs secondary to weakness of the left LE but overall increase strength and tolerance to performance on the right  Plan: Continue per plan of care

## 2019-01-18 ENCOUNTER — OFFICE VISIT (OUTPATIENT)
Dept: PHYSICAL THERAPY | Facility: CLINIC | Age: 64
End: 2019-01-18
Payer: MEDICARE

## 2019-01-18 DIAGNOSIS — M25.561 ACUTE PAIN OF RIGHT KNEE: ICD-10-CM

## 2019-01-18 DIAGNOSIS — R26.89 BALANCE PROBLEM: Primary | ICD-10-CM

## 2019-01-18 DIAGNOSIS — Z96.651 PRESENCE OF RIGHT ARTIFICIAL KNEE JOINT: ICD-10-CM

## 2019-01-18 PROCEDURE — 97150 GROUP THERAPEUTIC PROCEDURES: CPT | Performed by: PHYSICAL THERAPIST

## 2019-01-18 PROCEDURE — 97112 NEUROMUSCULAR REEDUCATION: CPT | Performed by: PHYSICAL THERAPIST

## 2019-01-18 PROCEDURE — 97140 MANUAL THERAPY 1/> REGIONS: CPT | Performed by: PHYSICAL THERAPIST

## 2019-01-18 PROCEDURE — 97110 THERAPEUTIC EXERCISES: CPT | Performed by: PHYSICAL THERAPIST

## 2019-01-18 NOTE — PROGRESS NOTES
Daily Note     Today's date: 2019  Patient name: Lisandro Mina  : 1955  MRN: [id number]  Referring provider: Jyotsna Ortiz MD  Dx:   Encounter Diagnosis     ICD-10-CM    1  Balance problem R26 89    2  Acute pain of right knee M25 561    3  Presence of right artificial knee joint Z96 651                   Subjective:  Patient reports bilateral knee pain and stiffness this morning  Patient reports she went to a massage last night and the states she had a lot of knots worked out in the legs  Patient states 3/10 pain levels in the bilateral knees at the start of the session  Post session, the patient reports feeling looser with decrease pain with 1/10 pain levels          Objective: See treatment diary below    Precautions:  WBAT Right LE     Specialty Daily Treatment Diary     Manual       Bilat knee and LE stretching and mobs  15 min  15 min  15 min  15 min                                         Exercise Diary       Nu-step  DC  DC DC DC     Stand SLR all directions 2x10 Bilat  1 5#   On foam 2x10 Bilat   2 0#  On foam     2x10 Bilat  2 0#  On Foam  2x10 Bilat   1 0#   On Foam     Stand HR/TR On 4" step   2x10 Bilat  2x10 Bilat   4" step  2x10 Bilat  4" step  2x10 Bilat  4" step    S/L Gastroc Stretch on step  10x10" Hold   Bilat  4" step  10x10" Hold  Bilat  4" step  10x10" Hold   Bilat  4" step  10x 10" hold  bilat   4" step    Step up and over  2x10 Bilat   6" step  2x10 Bilat  6" step  2x10 Bilat   6" step  10x  4" step    Step Downs 10X Bilat   6" step  10X Bilat  6" step  10X Bilat   6" step  10x Bilat   4" step    SLS and tandem on foam  Tandem walk on foam   4x10 Feet   4x10 Feet    And side step on foam  6x10 Feet  And side step on foam   6x10 Feet and side step on foam     Side step and squat  6x10 Feet  Green  6x10 Feet   Blue  6x10 Feet   Blue  6x10 Feet   Blue     Monster walk 6x10 Feet   Green  6x10 Feet  Blue  6x10 Feet   Blue  6x10 Feet   Blue Step downs     NT     High knee walk  6x10 Feet  1 5#  6x10 Feet  2 0#  6x10 Feet  2 0# NT     Quad set         Heel slides with strap         Bridge with Tball   NT NT     SAQ         DKTC with Tball   NT  NT  2x10 5" hold  Orange Tball    LTR with Tball   NT 2x10   Orange Tball  10x Bilat   Orange Tball     SRC 10 min L3  10 min L3  10 min L3  10 min L3                         Modalities 1/11 1/14 1/16 1/18     MHP to Low back and bilateral knees  15 min   With CP to the right knee today 15 min with CP to the right knee  15 min with CP to the right knee  15 min with CP to the right knee                             Assessment: Tolerated treatment fair  PT notes continuation of progression of TE program with focus on gait/balance and manual therapy to decrease pain levels and improve functional limitations to meet therapy goals  PT notes patient with continuation of difficulty with step downs on the left secondary to PMH of fibromyalgia but PT will continue to progress toward therapy goals and full TE session as tolerated by patient  Plan: Continue per plan of care

## 2019-01-21 ENCOUNTER — OFFICE VISIT (OUTPATIENT)
Dept: PHYSICAL THERAPY | Facility: CLINIC | Age: 64
End: 2019-01-21
Payer: MEDICARE

## 2019-01-21 DIAGNOSIS — Z96.651 PRESENCE OF RIGHT ARTIFICIAL KNEE JOINT: ICD-10-CM

## 2019-01-21 DIAGNOSIS — M25.561 ACUTE PAIN OF RIGHT KNEE: ICD-10-CM

## 2019-01-21 DIAGNOSIS — R26.89 BALANCE PROBLEM: Primary | ICD-10-CM

## 2019-01-21 PROCEDURE — 97110 THERAPEUTIC EXERCISES: CPT

## 2019-01-21 PROCEDURE — 97140 MANUAL THERAPY 1/> REGIONS: CPT

## 2019-01-21 PROCEDURE — 97112 NEUROMUSCULAR REEDUCATION: CPT

## 2019-01-21 NOTE — PROGRESS NOTES
Daily Note     Today's date: 2019  Patient name: Augie Easton  : 1955  MRN: [id number]  Referring provider: Mario Donahue MD  Dx:   Encounter Diagnosis     ICD-10-CM    1  Balance problem R26 89    2  Acute pain of right knee M25 561    3  Presence of right artificial knee joint Z96 651                   Subjective: Patient reports L knee "has been giving (her) trouble" lately  She reports frequent episodes of it giving out  "My right feels better than the left now   I see the doctor today, I'm hoping to get Euflexxa "    Precautions:  WBAT Right LE     Specialty Daily Treatment Diary     Manual     Bilat knee and LE stretching and mobs  15 min  15 min  15 min  15 min  15 min                                       Exercise Diary     [address] [facility] L3 10 min L3 10 min L3 10 min L3 10 min L3 10 min   Stand SLR all directions 2x10 Bilat  1 5#   On foam 2x10 Bilat   2 0#  On foam     2x10 Bilat  2 0#  On Foam  2x10 Bilat   1 0#   On Foam  2x10 Bilat 1 0# FOAM   Stand HR/TR On 4" step   2x10 Bilat  2x10 Bilat   4" step  2x10 Bilat  4" step  2x10 Bilat  4" step 2x10 Bilat  4" Step   S/L Gastroc Stretch on step  10x10" Hold   Bilat  4" step  10x10" Hold  Bilat  4" step  10x10" Hold   Bilat  4" step  10x 10" hold  bilat   4" step 10x 10" hd Bilat 4" Step   Step up and over  2x10 Bilat   6" step  2x10 Bilat  6" step  2x10 Bilat   6" step  10x  4" step 10x   4" Step   Step Downs 10X Bilat   6" step  10X Bilat  6" step  10X Bilat   6" step  10x Bilat   4" step 15x L (Pain)  2x10 R  4" Step   SLS and tandem on foam  Tandem walk on foam   4x10 Feet   4x10 Feet    And side step on foam  6x10 Feet  And side step on foam   6x10 Feet and side step on foam  6x 10 ft ea   Side step and squat  6x10 Feet  Green  6x10 Feet   Blue  6x10 Feet   Blue  6x10 Feet   Blue  6x 10 feet Blue   Monster walk 6x10 Feet   Green  6x10 Feet  Blue  6x10 Feet   Blue  6x10 Feet   Blue  6x 10 ft Blue Step downs     NT     High knee walk  6x10 Feet  1 5#  6x10 Feet  2 0#  6x10 Feet  2 0# NT     Quad set         Heel slides with strap         Bridge with Tball   NT NT     SAQ         DKTC with Tball   NT  NT  2x10 5" hold  Orange Tball 2x10 5" hd Orange Tball   LTR with Tball   NT 2x10   Orange Tball  10x Bilat   Orange Tball  10x Bilat [place]                               Modalities 1/11 1/14 1/16 1/18 1/21   MHP to Low back and bilateral knees  15 min   With CP to the right knee today 15 min with CP to the right knee  15 min with CP to the right knee  15 min with CP to the right knee  15 min w CP to right knee                           Assessment: Tolerated treatment fair  PT notes continuation of progression of TE program with focus on gait/balance and manual therapy to decrease pain levels and improve functional limitations to meet therapy goals  PT notes patient with continuation of difficulty with step downs on the left secondary to PMH of fibromyalgia but PT will continue to progress toward therapy goals and full TE session as tolerated by patient  Plan: Continue per plan of care

## 2019-01-23 ENCOUNTER — OFFICE VISIT (OUTPATIENT)
Dept: PHYSICAL THERAPY | Facility: CLINIC | Age: 64
End: 2019-01-23
Payer: MEDICARE

## 2019-01-23 DIAGNOSIS — M25.561 ACUTE PAIN OF RIGHT KNEE: ICD-10-CM

## 2019-01-23 DIAGNOSIS — Z96.651 PRESENCE OF RIGHT ARTIFICIAL KNEE JOINT: ICD-10-CM

## 2019-01-23 DIAGNOSIS — R26.89 BALANCE PROBLEM: Primary | ICD-10-CM

## 2019-01-23 PROCEDURE — G8979 MOBILITY GOAL STATUS: HCPCS | Performed by: PHYSICAL THERAPIST

## 2019-01-23 PROCEDURE — 97110 THERAPEUTIC EXERCISES: CPT | Performed by: PHYSICAL THERAPIST

## 2019-01-23 PROCEDURE — 97140 MANUAL THERAPY 1/> REGIONS: CPT | Performed by: PHYSICAL THERAPIST

## 2019-01-23 PROCEDURE — 97535 SELF CARE MNGMENT TRAINING: CPT | Performed by: PHYSICAL THERAPIST

## 2019-01-23 PROCEDURE — G8980 MOBILITY D/C STATUS: HCPCS | Performed by: PHYSICAL THERAPIST

## 2019-01-23 NOTE — LETTER
2019    Nataly Trevino MD  [facility]    Patient: Salomón Carrillo   YOB: 1955   Date of Visit: 2019     Encounter Diagnosis     ICD-10-CM    1  Balance problem R26 89    2  Acute pain of right knee M25 561    3  Presence of right artificial knee joint Z96 651        Dear Dr Moreno Bud:    Please review the attached Plan of Care from [facility] recent visit  Please verify that you agree skilled therapy will be discharged by signing the attached document and sending it back to our office  If you have any questions or concerns, please don't hesitate to call  Sincerely,    Peter Jackson, PT      Referring Provider:      I certify that I have read the below Plan of Care and certify the need for these services furnished under this plan of treatment while under my care  Nataly Trevino MD  [address]: [phone]          PT Discharge    Today's date: 2019  Patient name: Salomón Carrillo  : 1955  MRN: [id number]  Referring provider: Baldemar Sarkar MD  Dx:   Encounter Diagnosis     ICD-10-CM    1  Balance problem R26 89    2  Acute pain of right knee M25 561    3  Presence of right artificial knee joint Z96 651                   Assessment  Assessment details: PT notes the patient with improved ROM and strength t/o the right knee and LE with demonstration of improved gait pattern and balance s/p right TKR so decision to DC with HEP secondary to patient has met the majority of therapy goals and is ready for a DC with HEP  Impairments: abnormal gait, abnormal or restricted ROM, activity intolerance, impaired balance, impaired physical strength, lacks appropriate home exercise program and pain with function  Understanding of Dx/Px/POC: good   Prognosis: good    Goals  ST  Initiate HEP-MET  2  Decrease pain by 25-50%-MET   3  Increase ROM by 25-50%-MET  4  Increase strength by 1-2 mm grades-MET  5  Patient ambulate with SPC or no AD-MET   LT  Demonstration of improved gait pattern and balance with SPC or no AD-MET   2  Decrease limitations with stairs, ADL, standing and walking-Partial MET   3  Improve ROM to 0-115+ in the right knee-MET   4   DC with HEP-MET     Plan  Plan details: DC with HEP  Patient would benefit from: PT eval and skilled physical therapy  Planned modality interventions: cryotherapy  Planned therapy interventions: manual therapy, neuromuscular re-education, patient education, postural training, self care, strengthening, stretching, therapeutic exercise, home exercise program, graded exercise, gait training, functional ROM exercises and flexibility  Frequency: 3x week  Duration in visits: 1  Duration in weeks: 1  Treatment plan discussed with: patient        Subjective Evaluation    History of Present Illness  Date of surgery: 10/24/2018  Mechanism of injury: surgery  Mechanism of injury: Patient reports dealing with bilateral knee pain for 20+ years from degenerative changes t/o the knee joints and fibromyalgia  Patient reports trials of skilled therapy and multiple injections in the knees with minimal change in status  Patient was found to be a candidate for joint replacement surgery  Patient underwent the right TKR surgery on 10/24/18 and was placed in rehab for 5 days  Patient recently released from acute rehab with orders for OPPT  PT notes significant PMH of depression, fibromyalgia, and anxiety  Presently the patient has attended 30 sessions of skilled therapy and feels 80-90% improvement since the start of therapy with overall decrease intensity and duration pain levels and increase strength in the right leg  Patient reports progression to no AD with ambulation but continuation of increase fibromyalgia symptoms on occasion which patient reports causes weakness in the legs with need to use SPC with ambulation      Patient is happy with the current status of the right knee and feels she is ready for a DC with [facility]  Patient reports biggest limitations is from the fibromyalgia symptoms on the left knee and LE  Patient reports she is now receiving joint supplement injections in the left knee with ortho MD and is a candidate for joint replacement on the left knee and patient is considering having the surgery completed in the next few months  Pain  Current pain ratin  At best pain ratin  At worst pain rating: 3  Location: Right knee   Quality: sharp and throbbing  Relieving factors: ice and rest  Aggravating factors: standing, walking and stair climbing  Progression: improved      Diagnostic Tests  X-ray: abnormal  Treatments  Previous treatment: physical therapy, injection treatment and medication  Current treatment: medication and physical therapy  Discharged from (in last 30 days): inpatient hospitalization  Patient Goals  Patient goals for therapy: decreased pain, decreased edema, improved balance, increased motion, increased strength and independence with ADLs/IADLs          Objective     Palpation     Right Tenderness of the adductor longus, iliopsoas and rectus femoris       Active Range of Motion     Right Hip   Flexion: 77 degrees   Abduction: WFL    Right Knee   Flexion: 116 degrees   Extension: WFL    Right Ankle/Foot   Normal active range of motion    Additional Active Range of Motion Details  PT notes the patient with improved ROM and strength t/o the right knee and LE    Passive Range of Motion     Right Hip   Flexion: 83 degrees   Abduction: WFL    Right Knee   Flexion: WFL  Extension: WFL    Mobility   Patellar Mobility:     Right Knee   WFL: medial, lateral, superior and inferior    Patellar Static Positioning   Right Knee: WFL    Strength/Myotome Testing     Right Hip   Planes of Motion   Flexion: 4+  Extension: 5  Abduction: 5  Adduction: 5  External rotation: 4+  Internal rotation: 5    Right Knee   Flexion: 5  Extension: 4+    Right Ankle/Foot   Dorsiflexion: 5  Plantar flexion: 5  Inversion: 5  Eversion: 5    Swelling     Left Knee Girth Measurement (cm)   Joint line: 43 cm    Right Knee Girth Measurement (cm)   Joint line: 44 cm    Ambulation   Weight-Bearing Status   Weight-Bearing Status (Left): weight-bearing as tolerated   Weight-Bearing Status (Right): weight-bearing as tolerated      Observational Gait   Gait: within functional limits   Walking speed and stride length within functional limits  Additional Observational Gait Details  PT notes the patient with demonstration of WNL gait pattern with rating of good with static and dynamic activities  PT notes the patient is independent with ambulation without AD         Flowsheet Rows      Most Recent Value   PT/OT G-Codes   FOTO information reviewed  Yes   Assessment Type  Discharge   G code set  Mobility: Walking & Moving Around   Mobility: Walking and Moving Around Goal Status ()  CJ   Mobility: Walking and Moving Around Discharge Status ()  CJ          Precautions:  WBAT Right LE     Specialty Daily Treatment Diary     Manual  1/23       Bilat knee and LE stretching and mobs  15 min                                            Exercise Diary  1/23       [address] [place] [address] L3 10 min       Stand SLR all directions        Stand HR/TR        S/L Gastroc Stretch on step         Step up and over         Step Downs        SLS and tandem on foam         Side step and squat         Monster walk        Step downs         High knee walk         Quad set         Heel slides with strap         Bridge with Tball         SAQ         DKTC with Nell         LTR with Tball         HEP update/review  X                           Modalities 1/23        MHP to Low back and bilateral knees  15 min   With CP to the right knee today

## 2019-01-23 NOTE — PROGRESS NOTES
PT Discharge    Today's date: 2019  Patient name: Bolivar Ortiz  : 1955  MRN: [id number]  Referring provider: Jenn Sarkar MD  Dx:   Encounter Diagnosis     ICD-10-CM    1  Balance problem R26 89    2  Acute pain of right knee M25 561    3  Presence of right artificial knee joint Z96 [id number]                   Assessment  Assessment details: PT notes the patient with improved ROM and strength t/o the right knee and LE with demonstration of improved gait pattern and balance s/p right TKR so decision to DC with HEP secondary to patient has met the majority of therapy goals and is ready for a DC with HEP  Impairments: abnormal gait, abnormal or restricted ROM, activity intolerance, impaired balance, impaired physical strength, lacks appropriate home exercise program and pain with function  Understanding of Dx/Px/POC: good   Prognosis: good    Goals  ST  Initiate HEP-MET  2  Decrease pain by 25-50%-MET   3  Increase ROM by 25-50%-MET  4  Increase strength by 1-2 mm grades-MET  5  Patient ambulate with SPC or no AD-MET   LT  Demonstration of improved gait pattern and balance with SPC or no AD-MET   2  Decrease limitations with stairs, ADL, standing and walking-Partial MET   3  Improve ROM to 0-115+ in the right knee-MET   4   DC with HEP-MET     Plan  Plan details: DC with HEP     Patient would benefit from: PT eval and skilled physical therapy  Planned modality interventions: cryotherapy  Planned therapy interventions: manual therapy, neuromuscular re-education, patient education, postural training, self care, strengthening, stretching, therapeutic exercise, home exercise program, graded exercise, gait training, functional ROM exercises and flexibility  Frequency: 3x week  Duration in visits: 1  Duration in weeks: 1  Treatment plan discussed with: patient        Subjective Evaluation    History of Present Illness  Date of surgery: 10/24/2018  Mechanism of injury: surgery  Mechanism of injury: Patient reports dealing with bilateral knee pain for 20+ years from degenerative changes t/o the knee joints and fibromyalgia  Patient reports trials of skilled therapy and multiple injections in the knees with minimal change in status  Patient was found to be a candidate for joint replacement surgery  Patient underwent the right TKR surgery on 10/24/18 and was placed in rehab for 5 days  Patient recently released from acute rehab with orders for OPPT  PT notes significant PMH of depression, fibromyalgia, and anxiety  Presently the patient has attended 30 sessions of skilled therapy and feels 80-90% improvement since the start of therapy with overall decrease intensity and duration pain levels and increase strength in the right leg  Patient reports progression to no AD with ambulation but continuation of increase fibromyalgia symptoms on occasion which patient reports causes weakness in the legs with need to use SPC with ambulation  Patient is happy with the current status of the right knee and feels she is ready for a DC with HEP  Patient reports biggest limitations is from the fibromyalgia symptoms on the left knee and LE  Patient reports she is now receiving joint supplement injections in the left knee with ortho MD and is a candidate for joint replacement on the left knee and patient is considering having the surgery completed in the next few months      Pain  Current pain ratin  At best pain ratin  At worst pain rating: 3  Location: Right knee   Quality: sharp and throbbing  Relieving factors: ice and rest  Aggravating factors: standing, walking and stair climbing  Progression: improved      Diagnostic Tests  X-ray: abnormal  Treatments  Previous treatment: physical therapy, injection treatment and medication  Current treatment: medication and physical therapy  Discharged from (in last 30 days): inpatient hospitalization  Patient Goals  Patient goals for therapy: decreased pain, decreased edema, improved balance, increased motion, increased strength and independence with ADLs/IADLs          Objective     Palpation     Right Tenderness of the adductor longus, iliopsoas and rectus femoris  Active Range of Motion     Right Hip   Flexion: 77 degrees   Abduction: WFL    Right Knee   Flexion: 116 degrees   Extension: WFL    Right Ankle/Foot   Normal active range of motion    Additional Active Range of Motion Details  PT notes the patient with improved ROM and strength t/o the right knee and LE    Passive Range of Motion     Right Hip   Flexion: 83 degrees   Abduction: WFL    Right Knee   Flexion: WFL  Extension: WFL    Mobility   Patellar Mobility:     Right Knee   WFL: medial, lateral, superior and inferior    Patellar Static Positioning   Right Knee: WFL    Strength/Myotome Testing     Right Hip   Planes of Motion   Flexion: 4+  Extension: 5  Abduction: 5  Adduction: 5  External rotation: 4+  Internal rotation: 5    Right Knee   Flexion: 5  Extension: 4+    Right Ankle/Foot   Dorsiflexion: 5  Plantar flexion: 5  Inversion: 5  Eversion: 5    Swelling     Left Knee Girth Measurement (cm)   Joint line: 43 cm    Right Knee Girth Measurement (cm)   Joint line: 44 cm    Ambulation   Weight-Bearing Status   Weight-Bearing Status (Left): weight-bearing as tolerated   Weight-Bearing Status (Right): weight-bearing as tolerated      Observational Gait   Gait: within functional limits   Walking speed and stride length within functional limits  Additional Observational Gait Details  PT notes the patient with demonstration of WNL gait pattern with rating of good with static and dynamic activities  PT notes the patient is independent with ambulation without AD         Flowsheet Rows      Most Recent Value   PT/OT G-Codes   FOTO information reviewed  Yes   Assessment Type  Discharge   G code set  Mobility: Walking & Moving Around   Mobility: Walking and Moving Around Goal Status ()  CJ   Mobility: Walking and Moving Around Discharge Status ()  CJ          Precautions:  WBAT Right LE     Specialty Daily Treatment Diary     Manual  1/23       Bilat knee and LE stretching and mobs  15 min                                            Exercise Diary  1/23       [facility] L3 10 min       Stand SLR all directions        Stand HR/TR        S/L Gastroc Stretch on step         Step up and over         Step Downs        SLS and tandem on foam         Side step and squat         Monster walk        Step downs         High knee walk         Quad set         Heel slides with strap         Bridge with Tball         SAQ         DKTC with Tball         LTR with Tball         HEP update/review  X                           Modalities 1/23        MHP to Low back and bilateral knees  15 min   With CP to the right knee today

## 2019-01-25 ENCOUNTER — OFFICE VISIT (OUTPATIENT)
Dept: FAMILY MEDICINE CLINIC | Facility: CLINIC | Age: 64
End: 2019-01-25
Payer: MEDICARE

## 2019-01-25 ENCOUNTER — APPOINTMENT (OUTPATIENT)
Dept: PHYSICAL THERAPY | Facility: CLINIC | Age: 64
End: 2019-01-25
Payer: MEDICARE

## 2019-01-25 VITALS
HEIGHT: 64 IN | SYSTOLIC BLOOD PRESSURE: 134 MMHG | DIASTOLIC BLOOD PRESSURE: 84 MMHG | BODY MASS INDEX: 33.43 KG/M2 | WEIGHT: 195.8 LBS

## 2019-01-25 DIAGNOSIS — Z12.31 ENCOUNTER FOR SCREENING MAMMOGRAM FOR BREAST CANCER: ICD-10-CM

## 2019-01-25 DIAGNOSIS — E66.9 OBESITY (BMI 35.0-39.9 WITHOUT COMORBIDITY): ICD-10-CM

## 2019-01-25 DIAGNOSIS — Z13.220 SCREENING FOR HYPERLIPIDEMIA: ICD-10-CM

## 2019-01-25 DIAGNOSIS — E55.9 VITAMIN D DEFICIENCY: ICD-10-CM

## 2019-01-25 DIAGNOSIS — Z00.00 MEDICARE ANNUAL WELLNESS VISIT, SUBSEQUENT: Primary | ICD-10-CM

## 2019-01-25 PROBLEM — R53.82 CHRONIC FATIGUE SYNDROME: Status: ACTIVE | Noted: 2019-01-25

## 2019-01-25 PROBLEM — K76.0 FATTY LIVER: Status: ACTIVE | Noted: 2019-01-25

## 2019-01-25 PROBLEM — Z96.651 HISTORY OF TOTAL RIGHT KNEE REPLACEMENT: Status: ACTIVE | Noted: 2018-10-24

## 2019-01-25 PROBLEM — G93.32 CHRONIC FATIGUE SYNDROME: Status: ACTIVE | Noted: 2019-01-25

## 2019-01-25 PROCEDURE — G0439 PPPS, SUBSEQ VISIT: HCPCS | Performed by: PHYSICIAN ASSISTANT

## 2019-01-25 NOTE — PROGRESS NOTES
Assessment and Plan:  Problem List Items Addressed This Visit        Other    Vitamin D deficiency    Relevant Orders    Vitamin D 25 hydroxy      Other Visit Diagnoses     Medicare annual wellness visit, subsequent    -  Primary    Relevant Orders    Comprehensive metabolic panel    Lipid panel    TSH, 3rd generation    Encounter for screening mammogram for breast cancer        Relevant Orders    Mammo screening bilateral w 3d & cad    Screening for hyperlipidemia        Relevant Orders    Lipid panel        Health Maintenance Due   Topic Date Due    Hepatitis C Screening  1955    Medicare Annual Wellness Visit (AWV)  1955    DTaP,Tdap,and Td Vaccines (1 - Tdap) 02/13/1977    MAMMOGRAM  12/03/2015    INFLUENZA VACCINE  07/01/2018    PT PLAN OF CARE  01/12/2019     Ordered labs  Patient will schedule appointment with gyn and get mammogram   Kenzie Guzman her to continue to follow with urology, GI, Rheumatology, and Ortho  Refused influenza vaccine  Discussed weight loss and healthy diet  HPI: Patient is a pleasant 59year old female presenting for her medicare annual well visit  She is requesting to have her thyroid and vitamin D screened  She was vitamin D deficient in the past, and is taking a daily supplement  Her daughters were both diagnosed with hashimoto's and she would like to have her thyroid checked  She is currently following with the urologist, gastroenterologist, rheumatologist, and orthopedics  She had her right knee replaced by Dr Silver Morel, and is doing well  She is considering getting the other knee done as well  She is exercising regularly, and trying to watch her diet      Patient Active Problem List   Diagnosis    Allergic rhinitis    Benign positional vertigo    Clinical xerostomia    Clostridium difficile colitis    Depression with anxiety    Dysphagia    Elevated ALT measurement    Enthesopathy of hip region    GERD (gastroesophageal reflux disease)    Irritable bowel syndrome    Knee pain    Mammogram abnormal    Memory loss    Murmur    Neck pain    Nephrolithiasis    Primary osteoarthritis of right knee    Restless legs syndrome    Sleep apnea    Stricture esophagus    Schatzki's ring    Fibromyalgia    Chronic fatigue syndrome    Fatty liver    History of total right knee replacement    Vitamin D deficiency     Past Medical History:   Diagnosis Date    Anxiety     Arthritis     Depression     Eczema     Last Assessed: 7/17/2013    Fibromyalgia     Last Assessed: 3/17/2015    History of Clostridium difficile colitis     Kidney stones     Sleep apnea     no CPAP     Past Surgical History:   Procedure Laterality Date    COLONOSCOPY      Complete    CYSTOSCOPY      with removal of object    CYSTOSCOPY W/ URETERAL STENT PLACEMENT Right     ESOPHAGEAL DILATION      ESOPHAGOGASTRODUODENOSCOPY  02/10/2015    Diagnostic    JOINT REPLACEMENT      right    KIDNEY STONE SURGERY      TUBAL LIGATION      and reversal    TUBOPLASTY / TUBOTUBAL ANASTOMOSIS      Oviductal Surgery     Family History   Problem Relation Age of Onset   [facility] Breast cancer Mother     Cancer Mother     Diabetes Mother     Heart disease Mother    [facility] Stroke Mother     Alcohol abuse Father     Cancer Father     Depression Father     Heart disease Father     Thyroid disease Father     Diabetes type II Father     Hypertension Sister     Other Family         Palpitations     History   Smoking Status    Never Smoker   Smokeless Tobacco    Never Used     History   Alcohol Use No      History   Drug Use No         Current Outpatient Prescriptions   Medication Sig Dispense Refill    DULoxetine (CYMBALTA) 30 mg delayed release capsule Take 30 mg by mouth daily        DULoxetine (CYMBALTA) 60 mg delayed release capsule Take 1 capsule by mouth daily      ergocalciferol (VITAMIN D2) 50,000 units Take 1 capsule by mouth once a week      traZODone (DESYREL) 150 mg tablet Take 1 tablet by mouth daily at bedtime         No current facility-administered medications for this visit  Allergies   Allergen Reactions    Codeine Other (See Comments)     Reaction Date: ; Hallucinations     Epinephrine Other (See Comments)     Syncope     Latex Swelling    Neosporin Plus Max St      Immunization History   Administered Date(s) Administered    Influenza 10/13/2011    Influenza TIV (IM) 2012, 10/11/2013       Patient Care Team:  Jennyfer Colmenares DO as PCP - General    Medicare Screening Tests and Risk Assessments:  Enrico Pittman is here for her Subsequent Wellness visit  Last Medicare Wellness visit information reviewed, patient interviewed and updates made to the record today  Health Risk Assessment:  Patient rates overall health as good  Patient feels that their physical health rating is Slightly better  Eyesight was rated as Same  Hearing was rated as Same  Patient feels that their emotional and mental health rating is Same  Pain experienced by patient in the last 7 days has been Some  Patient's pain rating has been 3/10  Patient states that she has experienced no weight loss or gain in last 6 months  Emotional/Mental Health:  Patient has been feeling nervous/anxious  PHQ-9 Depression Screening:    Frequency of the following problems over the past two weeks:      1  Little interest or pleasure in doing things: 0 - not at all      2  Feeling down, depressed, or hopeless: 0 - not at all  PHQ-2 Score: 0          Broken Bones/Falls: Fall Risk Assessment:    In the past year, patient has experienced: No history of falling in past year  visual disturbance    Bladder/Bowel:  Patient has leaked urine accidently in the last six months  Patient reports no loss of bowel control  Immunizations:  Patient has not had a flu vaccination within the last year  Patient has not received a pneumonia shot  Patient has not received a shingles shot    Patient has not received tetanus/diphtheria shot  Home Safety:  Patient has trouble with stairs inside or outside of their home  Patient currently reports that there are no safety hazards present in home, working smoke alarms, working carbon monoxide detectors  Preventative Screenings:   No breast cancer screening performed, no colon cancer screen completed, no cholesterol screen completed, glaucoma eye exam completed,     Nutrition:  Current diet: Regular and Limited junk food with servings of the following:    Medications:  Patient is currently taking over-the-counter supplements  List of OTC medications includes: Yes, essential oils and supplements  Patient is able to manage medications  Lifestyle Choices:  Patient reports no tobacco use  Patient has not smoked or used tobacco in the past   Patient reports no alcohol use  Patient drives a vehicle  Patient wears seat belt  Current level of exercise of physical activity described by patient as: Yes, bicycling and strength training  Activities of Daily Living:  Can get out of bed by his or her self, able to dress self, able to make own meals, able to do own shopping, able to bathe self, can do own laundry/housekeeping, can manage own money, pay bills and track expenses    Previous Hospitalizations:  Hospitalization or ED visit in past 12 months  Number of hospitalizations within the last year: 3-4  Additional Comments: Knee surgery, kidney stone and esophagitis dilation    Advanced Directives:  Patient has decided on a power of   Patient has spoken to designated power of   Patient has not completed advanced directive          Preventative Screening/Counseling:      Cardiovascular:      General: Risks and Benefits Discussed      Counseling: Healthy Diet and Healthy Weight     Due for Labs/Analytes/Optional EKG: Lipid Panel          Diabetes:      General: Risks and Benefits Discussed      Counseling: Healthy Diet and Healthy Weight      Due for labs: Blood Glucose          Colorectal Cancer:      General: Screening Current          Breast Cancer:      General: Risks and Benefits Discussed          Cervical Cancer:      General: Risks and Benefits Discussed      Comments: Patient will schedule with gynecologist        Osteoporosis:      General: Screening Not Indicated          AAA:      General: Screening Not Indicated          Glaucoma:      General: Risks and Benefits Discussed and Screening Current          HIV:      General: Screening Not Indicated          Hepatitis C:      General: Screening Current      Additional Comments: Was done by her GI doctor  Advanced Directives:   Patient has no living will for healthcare, does not have durable POA for healthcare, patient does not have an advanced directive  Immunizations:      Influenza: Patient Declines      Other Preventative Counseling (Non-Medicare): Fall Prevention, Increase physical activity and Nutrition Counseling        Physical Exam:  Review of Systems   Constitutional: Negative for chills, diaphoresis, fatigue and fever  HENT: Negative for congestion, ear pain, postnasal drip, rhinorrhea, sneezing, sore throat and trouble swallowing  Eyes: Negative for pain and visual disturbance  Respiratory: Negative for apnea, cough, shortness of breath and wheezing  Cardiovascular: Negative for chest pain and palpitations  Gastrointestinal: Negative for abdominal pain, bowel incontinence, constipation, diarrhea, nausea and vomiting  Genitourinary: Negative for dysuria and hematuria  Musculoskeletal: Positive for arthralgias and back pain  Negative for gait problem and myalgias  Neurological: Negative for dizziness, syncope, weakness, light-headedness, numbness and headaches  Psychiatric/Behavioral: Negative for suicidal ideas  The patient is not nervous/anxious        Vitals:    01/25/19 0904   BP: 134/84   BP Location: Left arm   Patient Position: Sitting   Weight: 88 8 kg (195 lb 12 8 oz)   Height: 5' 4" (1 626 m)   Body mass index is 33 61 kg/m²  Physical Exam   Constitutional: She is oriented to person, place, and time  She appears well-developed and well-nourished  HENT:   Head: Normocephalic and atraumatic  Right Ear: External ear normal    Left Ear: External ear normal    Nose: Nose normal    Mouth/Throat: Oropharynx is clear and moist    Eyes: Pupils are equal, round, and reactive to light  EOM are normal    Neck: Normal range of motion  Neck supple  Cardiovascular: Normal rate, regular rhythm and normal heart sounds  Exam reveals no gallop and no friction rub  No murmur heard  Pulmonary/Chest: Effort normal and breath sounds normal  No respiratory distress  She has no wheezes  She has no rales  Abdominal: Soft  Bowel sounds are normal  She exhibits no distension  There is no tenderness  There is no guarding  Musculoskeletal: She exhibits deformity (arthritic deformity of left knee)  Lymphadenopathy:     She has no cervical adenopathy  Neurological: She is alert and oriented to person, place, and time  Skin: Skin is warm and dry  Psychiatric: She has a normal mood and affect  Her behavior is normal  Judgment and thought content normal    Vitals reviewed  BMI Counseling: Body mass index is 33 61 kg/m²  Discussed the patient's BMI with her  The BMI is above average  BMI counseling and education was provided to the patient  Nutrition recommendations include reducing portion sizes, 3-5 servings of fruits/vegetables daily, reducing fast food intake and consuming healthier snacks  Exercise recommendations include exercising 3-5 times per week

## 2019-01-30 ENCOUNTER — TRANSCRIBE ORDERS (OUTPATIENT)
Dept: PHYSICAL THERAPY | Facility: CLINIC | Age: 64
End: 2019-01-30

## 2019-01-30 DIAGNOSIS — R26.89 BALANCE PROBLEM: Primary | ICD-10-CM

## 2019-01-30 DIAGNOSIS — Z96.651 PRESENCE OF RIGHT ARTIFICIAL KNEE JOINT: ICD-10-CM

## 2019-01-30 DIAGNOSIS — M25.561 ACUTE PAIN OF RIGHT KNEE: ICD-10-CM

## 2019-01-31 ENCOUNTER — OFFICE VISIT (OUTPATIENT)
Dept: OBGYN CLINIC | Facility: CLINIC | Age: 64
End: 2019-01-31
Payer: MEDICARE

## 2019-01-31 VITALS
HEIGHT: 64 IN | DIASTOLIC BLOOD PRESSURE: 74 MMHG | WEIGHT: 194 LBS | SYSTOLIC BLOOD PRESSURE: 120 MMHG | BODY MASS INDEX: 33.12 KG/M2

## 2019-01-31 DIAGNOSIS — Z12.4 SCREENING FOR CERVICAL CANCER: ICD-10-CM

## 2019-01-31 DIAGNOSIS — Z11.51 SCREENING FOR HPV (HUMAN PAPILLOMAVIRUS): ICD-10-CM

## 2019-01-31 DIAGNOSIS — Z01.419 ENCOUNTER FOR GYNECOLOGICAL EXAMINATION WITHOUT ABNORMAL FINDING: Primary | ICD-10-CM

## 2019-01-31 PROCEDURE — G0145 SCR C/V CYTO,THINLAYER,RESCR: HCPCS | Performed by: OBSTETRICS & GYNECOLOGY

## 2019-01-31 PROCEDURE — 87624 HPV HI-RISK TYP POOLED RSLT: CPT | Performed by: OBSTETRICS & GYNECOLOGY

## 2019-01-31 PROCEDURE — G0101 CA SCREEN;PELVIC/BREAST EXAM: HCPCS | Performed by: OBSTETRICS & GYNECOLOGY

## 2019-01-31 NOTE — PROGRESS NOTES
CC:  Annual exam    HPI: Avery Meraz presents for routine gyn exam   The patient's only concern is her decreased sex drive and was curious whether not hormonal testing would be worthwhile  We had a discussion in regards to the lack of any viable medications or studies verifying that hormones would increase the sex drive  We also discussed the lack of utility for hormone testing in a person who is menopausal with respect to sex drive issues  In that regard the patient denies any vaginal dryness, heat flashes or night sweats  She was advised to confer with her other prescribing physicians since certain medications do have side effects that decrease the sex drive and to see if these medications could be lessened or eliminated  Past Medical History:  Past Medical History:   Diagnosis Date    Anxiety     Arthritis     Depression     Eczema     Last Assessed: 7/17/2013    Fibromyalgia     Last Assessed: 3/17/2015    History of Clostridium difficile colitis     Kidney stones     Sleep apnea     no CPAP       Past Surgical History:  Past Surgical History:   Procedure Laterality Date    COLONOSCOPY      Complete    CYSTOSCOPY      with removal of object    CYSTOSCOPY W/ URETERAL STENT PLACEMENT Right     ESOPHAGEAL DILATION      ESOPHAGOGASTRODUODENOSCOPY  02/10/2015    Diagnostic    JOINT REPLACEMENT      right    KIDNEY STONE SURGERY      TUBAL LIGATION      and reversal    TUBOPLASTY / TUBOTUBAL ANASTOMOSIS      Oviductal Surgery       Past OB/Gyn History:  Patient is menopausal   Denies any history of sexually transmitted infection  No history of abnormal pap smears  Her last pap smear was unknown  ALLERGIES:   Allergies   Allergen Reactions    Codeine Other (See Comments)     Reaction Date: 26Jan2012;    Hallucinations     Epinephrine Other (See Comments)     Syncope     Latex Swelling    Neosporin Plus Max St        MEDS:   Current Outpatient Prescriptions:     bupivacaine 0 5% and lidocaine 2% (1:1)10 ml with dexamethasone 4mg    DULoxetine (CYMBALTA) 30 mg delayed release capsule    DULoxetine (CYMBALTA) 60 mg delayed release capsule    ergocalciferol (VITAMIN D2) 50,000 units    traZODone (DESYREL) 150 mg tablet    Family History:  Family History   Problem Relation Age of Onset    Breast cancer Mother     Cancer Mother     Diabetes Mother     Heart disease Mother     Stroke Mother     Alcohol abuse Father     Cancer Father     Depression Father     Heart disease Father     Thyroid disease Father     Diabetes type II Father     Hypertension Sister     Other Family         Palpitations       Social History:  Social History     Social History    Marital status: /Civil Union     Spouse name: N/A    Number of children: N/A    Years of education: N/A     Occupational History    Not on file  Social History Main Topics    Smoking status: Never Smoker    Smokeless tobacco: Never Used    Alcohol use No    Drug use: No    Sexual activity: Not on file     Other Topics Concern    Not on file     Social History Narrative    No living will         Review of Systems:  Gen:   Denies fatigue, chills, nausea, vomiting, fever  Skin: No rashes or discolorations of any concern  RESP: Denies SOB, no cough  CV: Denies chest pain or palpitations  Breasts: Denies masses, pain, skin changes and nipple discharge  GI: Denies abdominal pain, heartburn, nausea, vomiting, changes in bowel habits  : Denies dysuria, frequency, CVA tenderness, incontinence and hematuria  Genitalia: Denies abnormal vaginal discharge, external lesions, rashes, pelvic pain, pressure, abnormal bleeding    Rectal:  Denies pain, bleeding, hemorrhoids,    Physical Exam:  /74 (BP Location: Left arm, Patient Position: Sitting, Cuff Size: Standard)   Ht 5' 4 25" (1 632 m)   Wt 88 kg (194 lb)   BMI 33 04 kg/m²    Gen: The patient was alert and oriented x3, pleasant well-appearing female in no acute distress  Neck:  Unremarkable, no lymphadenopathy, no thyromegaly, or tenderness  Breasts: Symmetric  No dominant, discrete, fixed  or suspicious masses are noted  No skin or nipple changes  No palpable axillary nodes  No supraclavicular adenopathy  Abd:  Soft, obese, nontender, nondistended, no masses or organomegaly  Back:  No CVA tenderness, no tenderness to palpation along spine  Pelvic  Normal appearing external female genitalia, no visible lesions, no rashes  Vagina is free of discharge, normal vaginal epithelium, no abnormal  lesions, no evidence of prolapse anteriorly or posteriorly  Normal appearing cervix, mobile and nontender  A thin prep pap smear was obtained  Uterus is atrophic in size, mobile and, nontender  No palpable adnexal masses or tenderness  No anoperineal lesions  Rectal:  No masses, tenderness, hemorrhoids, or obvious blood  Skin:  No concerning lesions  Extremeties: No edema      Assessment & Plan:   1  Routine annual exam   Pap smear obtained    RTO one year orPRN  2  Encounter for screening mammogram, referral for mammogram given to patient  3   Osteoporosis prevention  Patient is taking adequate amounts of vitamin-D and calcium at this time

## 2019-02-04 LAB
HPV HR 12 DNA CVX QL NAA+PROBE: NEGATIVE
HPV16 DNA CVX QL NAA+PROBE: NEGATIVE
HPV18 DNA CVX QL NAA+PROBE: NEGATIVE

## 2019-02-06 ENCOUNTER — APPOINTMENT (OUTPATIENT)
Dept: LAB | Facility: MEDICAL CENTER | Age: 64
End: 2019-02-06
Payer: MEDICARE

## 2019-02-06 DIAGNOSIS — E55.9 VITAMIN D DEFICIENCY: ICD-10-CM

## 2019-02-06 DIAGNOSIS — Z00.00 MEDICARE ANNUAL WELLNESS VISIT, SUBSEQUENT: ICD-10-CM

## 2019-02-06 DIAGNOSIS — Z13.220 SCREENING FOR HYPERLIPIDEMIA: ICD-10-CM

## 2019-02-06 LAB
25(OH)D3 SERPL-MCNC: 68.6 NG/ML (ref 30–100)
ALBUMIN SERPL BCP-MCNC: 3.6 G/DL (ref 3.5–5)
ALP SERPL-CCNC: 96 U/L (ref 46–116)
ALT SERPL W P-5'-P-CCNC: 24 U/L (ref 12–78)
ANION GAP SERPL CALCULATED.3IONS-SCNC: 7 MMOL/L (ref 4–13)
AST SERPL W P-5'-P-CCNC: 13 U/L (ref 5–45)
BILIRUB SERPL-MCNC: 0.47 MG/DL (ref 0.2–1)
BUN SERPL-MCNC: 12 MG/DL (ref 5–25)
CALCIUM SERPL-MCNC: 9.3 MG/DL (ref 8.3–10.1)
CHLORIDE SERPL-SCNC: 108 MMOL/L (ref 100–108)
CHOLEST SERPL-MCNC: 180 MG/DL (ref 50–200)
CO2 SERPL-SCNC: 27 MMOL/L (ref 21–32)
CREAT SERPL-MCNC: 0.85 MG/DL (ref 0.6–1.3)
GFR SERPL CREATININE-BSD FRML MDRD: 74 ML/MIN/1.73SQ M
GLUCOSE P FAST SERPL-MCNC: 90 MG/DL (ref 65–99)
HDLC SERPL-MCNC: 78 MG/DL (ref 40–60)
LAB AP GYN PRIMARY INTERPRETATION: NORMAL
LDLC SERPL CALC-MCNC: 87 MG/DL (ref 0–100)
Lab: NORMAL
NONHDLC SERPL-MCNC: 102 MG/DL
POTASSIUM SERPL-SCNC: 4 MMOL/L (ref 3.5–5.3)
PROT SERPL-MCNC: 6.9 G/DL (ref 6.4–8.2)
SODIUM SERPL-SCNC: 142 MMOL/L (ref 136–145)
TRIGL SERPL-MCNC: 77 MG/DL
TSH SERPL DL<=0.05 MIU/L-ACNC: 1.71 UIU/ML (ref 0.36–3.74)

## 2019-02-06 PROCEDURE — 80061 LIPID PANEL: CPT

## 2019-02-06 PROCEDURE — 82306 VITAMIN D 25 HYDROXY: CPT

## 2019-02-06 PROCEDURE — 80053 COMPREHEN METABOLIC PANEL: CPT

## 2019-02-06 PROCEDURE — 36415 COLL VENOUS BLD VENIPUNCTURE: CPT

## 2019-02-06 PROCEDURE — 84443 ASSAY THYROID STIM HORMONE: CPT

## 2019-02-14 ENCOUNTER — HOSPITAL ENCOUNTER (OUTPATIENT)
Dept: MAMMOGRAPHY | Facility: HOSPITAL | Age: 64
Discharge: HOME | End: 2019-02-14
Payer: MEDICARE

## 2019-02-14 VITALS — WEIGHT: 194 LBS | HEIGHT: 65 IN | BODY MASS INDEX: 32.32 KG/M2

## 2019-02-14 DIAGNOSIS — Z12.31 ENCOUNTER FOR SCREENING MAMMOGRAM FOR BREAST CANCER: ICD-10-CM

## 2019-02-14 PROCEDURE — 77063 BREAST TOMOSYNTHESIS BI: CPT

## 2019-02-14 PROCEDURE — 77067 SCR MAMMO BI INCL CAD: CPT

## 2019-02-27 ENCOUNTER — TRANSCRIBE ORDERS (OUTPATIENT)
Dept: PHYSICAL THERAPY | Facility: CLINIC | Age: 64
End: 2019-02-27

## 2019-02-27 DIAGNOSIS — R26.89 BALANCE PROBLEM: Primary | ICD-10-CM

## 2019-02-27 DIAGNOSIS — Z96.651 PRESENCE OF RIGHT ARTIFICIAL KNEE JOINT: ICD-10-CM

## 2019-02-27 DIAGNOSIS — M25.561 ACUTE PAIN OF RIGHT KNEE: ICD-10-CM

## 2019-03-26 ENCOUNTER — CONSULT (OUTPATIENT)
Dept: FAMILY MEDICINE CLINIC | Facility: CLINIC | Age: 64
End: 2019-03-26
Payer: MEDICARE

## 2019-03-26 VITALS
SYSTOLIC BLOOD PRESSURE: 112 MMHG | DIASTOLIC BLOOD PRESSURE: 78 MMHG | WEIGHT: 198.2 LBS | BODY MASS INDEX: 33.02 KG/M2 | HEIGHT: 65 IN

## 2019-03-26 DIAGNOSIS — M17.12 PRIMARY OSTEOARTHRITIS OF LEFT KNEE: ICD-10-CM

## 2019-03-26 DIAGNOSIS — Z01.818 PRE-OP EXAMINATION: Primary | ICD-10-CM

## 2019-03-26 DIAGNOSIS — M79.7 FIBROMYALGIA: ICD-10-CM

## 2019-03-26 PROCEDURE — 99214 OFFICE O/P EST MOD 30 MIN: CPT | Performed by: FAMILY MEDICINE

## 2019-03-26 NOTE — PROGRESS NOTES
Subjective:     Vaughn Gonzalez is a 62 y o  female who presents to the office today for a preoperative consultation at the request of surgeon Dr Pretty Carlton who plans on performing L TKR on April 10  This consultation is requested for the specific conditions prompting preoperative evaluation (i e  because of potential affect on operative risk): Fibromyalgia  Planned anesthesia: spinal  The patient has the following known anesthesia issues: None  Patients bleeding risk: no recent abnormal bleeding  The following portions of the patient's history were reviewed and updated as appropriate:   She  has a past medical history of Anxiety, Arthritis, Depression, Eczema, Fibromyalgia, History of Clostridium difficile colitis, Kidney stones, and Sleep apnea  She   Patient Active Problem List    Diagnosis Date Noted    Primary osteoarthritis of left knee 02/04/2019    Chronic fatigue syndrome 01/25/2019    Fatty liver 01/25/2019    Vitamin D deficiency 01/25/2019    History of total right knee replacement 10/24/2018    Fibromyalgia 10/16/2018    Enthesopathy of hip region 10/03/2018    Knee pain 10/03/2018    Schatzki's ring 10/03/2018    Primary osteoarthritis of right knee 08/13/2018    Benign positional vertigo 07/07/2016    Neck pain 09/23/2015    Nephrolithiasis 06/12/2015    Clostridium difficile colitis 02/09/2015    Clinical xerostomia 12/10/2014    Dysphagia 12/10/2014    Irritable bowel syndrome 12/10/2014    GERD (gastroesophageal reflux disease) 12/01/2014    Stricture esophagus 12/01/2014    Depression with anxiety 11/03/2014    Elevated ALT measurement 04/25/2014    Mammogram abnormal 12/10/2013    Restless legs syndrome 09/16/2013    Sleep apnea 09/16/2013    Memory loss 04/18/2013    Murmur 04/18/2013    Allergic rhinitis 09/19/2012     She  has a past surgical history that includes Kidney stone surgery; Tubal ligation; Esophageal dilation; Colonoscopy;  Cystoscopy w/ ureteral stent placement (Right); Cystoscopy; Esophagogastroduodenoscopy (02/10/2015); Tuboplasty / tubotubal anastomosis; and Joint replacement  Her family history includes Alcohol abuse in her father; Breast cancer (age of onset: 54) in her mother; Cancer in her father and mother; Depression in her father; Diabetes in her mother; Diabetes type II in her father; Heart disease in her father and mother; Hypertension in her sister; Other in her family; Stroke in her mother; Thyroid disease in her father  She  reports that she has never smoked  She has never used smokeless tobacco  She reports that she does not drink alcohol or use drugs  Current Outpatient Medications   Medication Sig Dispense Refill    bupivacaine 0 5% and lidocaine 2% (1:1)10 ml with dexamethasone 4mg 2 mL      DULoxetine (CYMBALTA) 60 mg delayed release capsule Take 1 capsule by mouth daily      ergocalciferol (VITAMIN D2) 50,000 units Take 1 capsule by mouth once a week      traZODone (DESYREL) 150 mg tablet Take 1 tablet by mouth daily at bedtime        DULoxetine (CYMBALTA) 30 mg delayed release capsule Take 30 mg by mouth daily         No current facility-administered medications for this visit  Current Outpatient Medications on File Prior to Visit   Medication Sig    bupivacaine 0 5% and lidocaine 2% (1:1)10 ml with dexamethasone 4mg 2 mL    DULoxetine (CYMBALTA) 60 mg delayed release capsule Take 1 capsule by mouth daily    ergocalciferol (VITAMIN D2) 50,000 units Take 1 capsule by mouth once a week    traZODone (DESYREL) 150 mg tablet Take 1 tablet by mouth daily at bedtime      DULoxetine (CYMBALTA) 30 mg delayed release capsule Take 30 mg by mouth daily       No current facility-administered medications on file prior to visit  She is allergic to codeine; epinephrine; latex; and neosporin plus max st     Review of Systems  Pertinent items are noted in HPI       Objective:  Physical Exam   Constitutional: She is oriented to person, place, and time  She appears well-developed and well-nourished  No distress  HENT:   Head: Normocephalic and atraumatic  Eyes: Conjunctivae are normal    Cardiovascular: Normal rate, regular rhythm and normal heart sounds  Exam reveals no gallop and no friction rub  No murmur heard  Pulmonary/Chest: Effort normal and breath sounds normal  No respiratory distress  She has no wheezes  She has no rales  Musculoskeletal: She exhibits no edema  Neurological: She is alert and oriented to person, place, and time  Skin: She is not diaphoretic  Psychiatric: She has a normal mood and affect  Her behavior is normal  Judgment and thought content normal    Vitals reviewed        Cardiographics  ECG: normal sinus rhythm, no blocks or conduction defects, no ischemic changes  Echocardiogram: not done    Imaging  Chest x-ray: N/A     Lab Review   Appointment on 02/06/2019   Component Date Value    Sodium 02/06/2019 142     Potassium 02/06/2019 4 0     Chloride 02/06/2019 108     CO2 02/06/2019 27     ANION GAP 02/06/2019 7     BUN 02/06/2019 12     Creatinine 02/06/2019 0 85     Glucose, Fasting 02/06/2019 90     Calcium 02/06/2019 9 3     AST 02/06/2019 13     ALT 02/06/2019 24     Alkaline Phosphatase 02/06/2019 96     Total Protein 02/06/2019 6 9     Albumin 02/06/2019 3 6     Total Bilirubin 02/06/2019 0 47     eGFR 02/06/2019 74     Cholesterol 02/06/2019 180     Triglycerides 02/06/2019 77     HDL, Direct 02/06/2019 78*    LDL Calculated 02/06/2019 87     Non-HDL-Chol (CHOL-HDL) 02/06/2019 102     Vit D, 25-Hydroxy 02/06/2019 68 6     TSH 3RD GENERATON 02/06/2019 1 710    Office Visit on 01/31/2019   Component Date Value    Case Report 01/31/2019                      Value:Gynecologic Cytology Report                       Case: [id number]                                  Authorizing Provider:  Osito Brower MD           Collected:           01/31/2019 1517              Ordering Location: [facility]    Received:            01/31/2019 7322                                     L[place]                                                                    First Screen:          Hai Franz                                                                  Specimen:    LIQUID-BASED PAP, SCREENING, Cervix                                                        Primary Interpretation 01/31/2019 Negative for intraepithelial lesion or malignancy     Specimen Adequacy 01/31/2019 Satisfactory for evaluation  Endocervical/transformation zone component present   Additional Information 01/31/2019                      Value: This result contains rich text formatting which cannot be displayed here   HPV Other HR 01/31/2019 Negative     HPV16 01/31/2019 Negative     HPV18 01/31/2019 Negative         Assessment:     62 y o  female with planned surgery as above  Known risk factors for perioperative complications: None    Difficulty with intubation is not anticipated  Current medications which may produce withdrawal symptoms if withheld perioperatively: none      Plan:  Medically cleared for L TKR on 4/10/2019 by Dr Sita Kelley  F/U PRN  1  Preoperative workup as follows hemoglobin, hematocrit, electrolytes, creatinine, glucose  2  Change in medication regimen before surgery: none, continue medication regimen including morning of surgery, with sip of water  3  Prophylaxis for cardiac events with perioperative beta-blockers: not indicated  4  Invasive hemodynamic monitoring perioperatively: not indicated    5  Deep vein thrombosis prophylaxis postoperatively:regimen to be chosen by surgical team   6  Surveillance for postoperative MI with ECG immediately postoperatively and on postoperative days 1 and 2 AND troponin levels 24 hours postoperatively and on day 4 or hospital discharge (whichever comes first): at the discretion of anesthesiologist   7  Other measures: none

## 2019-04-08 ENCOUNTER — HOSPITAL ENCOUNTER (OUTPATIENT)
Dept: RADIOLOGY | Facility: HOSPITAL | Age: 64
Discharge: HOME | End: 2019-04-08
Payer: MEDICARE

## 2019-04-08 DIAGNOSIS — N20.0 NEPHROLITHIASIS: ICD-10-CM

## 2019-04-08 PROCEDURE — 74018 RADEX ABDOMEN 1 VIEW: CPT

## 2019-04-11 ENCOUNTER — TELEPHONE (OUTPATIENT)
Dept: UROLOGY | Facility: CLINIC | Age: 64
End: 2019-04-11

## 2019-04-11 DIAGNOSIS — N20.0 RENAL CALCULI: Primary | ICD-10-CM

## 2019-04-12 ENCOUNTER — TRANSITIONAL CARE MANAGEMENT (OUTPATIENT)
Dept: FAMILY MEDICINE CLINIC | Facility: CLINIC | Age: 64
End: 2019-04-12

## 2019-04-12 ENCOUNTER — TELEPHONE (OUTPATIENT)
Dept: FAMILY MEDICINE CLINIC | Facility: CLINIC | Age: 64
End: 2019-04-12

## 2019-04-15 ENCOUNTER — EVALUATION (OUTPATIENT)
Dept: PHYSICAL THERAPY | Facility: CLINIC | Age: 64
End: 2019-04-15
Payer: MEDICARE

## 2019-04-15 DIAGNOSIS — M17.12 PRIMARY OSTEOARTHRITIS OF LEFT KNEE: ICD-10-CM

## 2019-04-15 DIAGNOSIS — Z96.652 PRESENCE OF LEFT ARTIFICIAL KNEE JOINT: Primary | ICD-10-CM

## 2019-04-15 DIAGNOSIS — R26.9 GAIT ABNORMALITY: ICD-10-CM

## 2019-04-15 PROCEDURE — 97110 THERAPEUTIC EXERCISES: CPT | Performed by: PHYSICAL THERAPIST

## 2019-04-15 PROCEDURE — 97161 PT EVAL LOW COMPLEX 20 MIN: CPT | Performed by: PHYSICAL THERAPIST

## 2019-04-15 PROCEDURE — 97535 SELF CARE MNGMENT TRAINING: CPT | Performed by: PHYSICAL THERAPIST

## 2019-04-17 ENCOUNTER — OFFICE VISIT (OUTPATIENT)
Dept: PHYSICAL THERAPY | Facility: CLINIC | Age: 64
End: 2019-04-17
Payer: MEDICARE

## 2019-04-17 DIAGNOSIS — R26.9 GAIT ABNORMALITY: ICD-10-CM

## 2019-04-17 DIAGNOSIS — M17.12 PRIMARY OSTEOARTHRITIS OF LEFT KNEE: ICD-10-CM

## 2019-04-17 DIAGNOSIS — Z96.652 PRESENCE OF LEFT ARTIFICIAL KNEE JOINT: Primary | ICD-10-CM

## 2019-04-17 PROCEDURE — 97140 MANUAL THERAPY 1/> REGIONS: CPT | Performed by: PHYSICAL THERAPIST

## 2019-04-17 PROCEDURE — 97110 THERAPEUTIC EXERCISES: CPT | Performed by: PHYSICAL THERAPIST

## 2019-04-17 PROCEDURE — 97535 SELF CARE MNGMENT TRAINING: CPT | Performed by: PHYSICAL THERAPIST

## 2019-04-19 ENCOUNTER — OFFICE VISIT (OUTPATIENT)
Dept: PHYSICAL THERAPY | Facility: CLINIC | Age: 64
End: 2019-04-19
Payer: MEDICARE

## 2019-04-19 DIAGNOSIS — Z96.652 PRESENCE OF LEFT ARTIFICIAL KNEE JOINT: Primary | ICD-10-CM

## 2019-04-19 DIAGNOSIS — R26.9 GAIT ABNORMALITY: ICD-10-CM

## 2019-04-19 DIAGNOSIS — M17.12 PRIMARY OSTEOARTHRITIS OF LEFT KNEE: ICD-10-CM

## 2019-04-19 PROCEDURE — 97110 THERAPEUTIC EXERCISES: CPT | Performed by: PHYSICAL THERAPIST

## 2019-04-19 PROCEDURE — 97112 NEUROMUSCULAR REEDUCATION: CPT | Performed by: PHYSICAL THERAPIST

## 2019-04-19 PROCEDURE — 97140 MANUAL THERAPY 1/> REGIONS: CPT | Performed by: PHYSICAL THERAPIST

## 2019-04-20 ENCOUNTER — TRANSCRIBE ORDERS (OUTPATIENT)
Dept: PHYSICAL THERAPY | Facility: CLINIC | Age: 64
End: 2019-04-20

## 2019-04-20 DIAGNOSIS — M17.12 PRIMARY OSTEOARTHRITIS OF LEFT KNEE: ICD-10-CM

## 2019-04-20 DIAGNOSIS — Z96.652 PRESENCE OF LEFT ARTIFICIAL KNEE JOINT: Primary | ICD-10-CM

## 2019-04-20 DIAGNOSIS — R26.9 GAIT ABNORMALITY: ICD-10-CM

## 2019-04-22 ENCOUNTER — OFFICE VISIT (OUTPATIENT)
Dept: PHYSICAL THERAPY | Facility: CLINIC | Age: 64
End: 2019-04-22
Payer: MEDICARE

## 2019-04-22 DIAGNOSIS — N20.0 NEPHROLITHIASIS: Primary | ICD-10-CM

## 2019-04-22 DIAGNOSIS — R26.9 GAIT ABNORMALITY: ICD-10-CM

## 2019-04-22 DIAGNOSIS — M17.12 PRIMARY OSTEOARTHRITIS OF LEFT KNEE: ICD-10-CM

## 2019-04-22 DIAGNOSIS — Z96.652 PRESENCE OF LEFT ARTIFICIAL KNEE JOINT: Primary | ICD-10-CM

## 2019-04-22 PROCEDURE — 97110 THERAPEUTIC EXERCISES: CPT | Performed by: PHYSICAL THERAPIST

## 2019-04-22 PROCEDURE — 97150 GROUP THERAPEUTIC PROCEDURES: CPT | Performed by: PHYSICAL THERAPIST

## 2019-04-22 PROCEDURE — 97140 MANUAL THERAPY 1/> REGIONS: CPT | Performed by: PHYSICAL THERAPIST

## 2019-04-22 PROCEDURE — 97112 NEUROMUSCULAR REEDUCATION: CPT | Performed by: PHYSICAL THERAPIST

## 2019-04-24 ENCOUNTER — OFFICE VISIT (OUTPATIENT)
Dept: PHYSICAL THERAPY | Facility: CLINIC | Age: 64
End: 2019-04-24
Payer: MEDICARE

## 2019-04-24 DIAGNOSIS — Z96.652 PRESENCE OF LEFT ARTIFICIAL KNEE JOINT: Primary | ICD-10-CM

## 2019-04-24 DIAGNOSIS — M17.12 PRIMARY OSTEOARTHRITIS OF LEFT KNEE: ICD-10-CM

## 2019-04-24 DIAGNOSIS — R26.9 GAIT ABNORMALITY: ICD-10-CM

## 2019-04-24 PROCEDURE — 97140 MANUAL THERAPY 1/> REGIONS: CPT | Performed by: PHYSICAL THERAPIST

## 2019-04-24 PROCEDURE — 97110 THERAPEUTIC EXERCISES: CPT | Performed by: PHYSICAL THERAPIST

## 2019-04-24 PROCEDURE — 97112 NEUROMUSCULAR REEDUCATION: CPT | Performed by: PHYSICAL THERAPIST

## 2019-04-26 ENCOUNTER — OFFICE VISIT (OUTPATIENT)
Dept: PHYSICAL THERAPY | Facility: CLINIC | Age: 64
End: 2019-04-26
Payer: MEDICARE

## 2019-04-26 DIAGNOSIS — R26.9 GAIT ABNORMALITY: ICD-10-CM

## 2019-04-26 DIAGNOSIS — Z96.652 PRESENCE OF LEFT ARTIFICIAL KNEE JOINT: Primary | ICD-10-CM

## 2019-04-26 DIAGNOSIS — M17.12 PRIMARY OSTEOARTHRITIS OF LEFT KNEE: ICD-10-CM

## 2019-04-26 PROCEDURE — 97112 NEUROMUSCULAR REEDUCATION: CPT | Performed by: PHYSICAL THERAPIST

## 2019-04-26 PROCEDURE — 97140 MANUAL THERAPY 1/> REGIONS: CPT | Performed by: PHYSICAL THERAPIST

## 2019-04-26 PROCEDURE — 97110 THERAPEUTIC EXERCISES: CPT | Performed by: PHYSICAL THERAPIST

## 2019-04-29 ENCOUNTER — OFFICE VISIT (OUTPATIENT)
Dept: PHYSICAL THERAPY | Facility: CLINIC | Age: 64
End: 2019-04-29
Payer: MEDICARE

## 2019-04-29 DIAGNOSIS — M17.12 PRIMARY OSTEOARTHRITIS OF LEFT KNEE: ICD-10-CM

## 2019-04-29 DIAGNOSIS — R26.9 GAIT ABNORMALITY: ICD-10-CM

## 2019-04-29 DIAGNOSIS — Z96.652 PRESENCE OF LEFT ARTIFICIAL KNEE JOINT: Primary | ICD-10-CM

## 2019-04-29 PROCEDURE — 97112 NEUROMUSCULAR REEDUCATION: CPT | Performed by: PHYSICAL THERAPIST

## 2019-04-29 PROCEDURE — 97140 MANUAL THERAPY 1/> REGIONS: CPT | Performed by: PHYSICAL THERAPIST

## 2019-04-29 PROCEDURE — 97110 THERAPEUTIC EXERCISES: CPT | Performed by: PHYSICAL THERAPIST

## 2019-04-30 ENCOUNTER — HOSPITAL ENCOUNTER (OUTPATIENT)
Dept: CT IMAGING | Facility: HOSPITAL | Age: 64
Discharge: HOME | End: 2019-04-30
Attending: UROLOGY
Payer: MEDICARE

## 2019-04-30 DIAGNOSIS — N20.0 NEPHROLITHIASIS: ICD-10-CM

## 2019-04-30 PROCEDURE — 74176 CT ABD & PELVIS W/O CONTRAST: CPT

## 2019-05-01 ENCOUNTER — EVALUATION (OUTPATIENT)
Dept: PHYSICAL THERAPY | Facility: CLINIC | Age: 64
End: 2019-05-01
Payer: MEDICARE

## 2019-05-01 DIAGNOSIS — R26.9 GAIT ABNORMALITY: ICD-10-CM

## 2019-05-01 DIAGNOSIS — Z96.652 PRESENCE OF LEFT ARTIFICIAL KNEE JOINT: Primary | ICD-10-CM

## 2019-05-01 DIAGNOSIS — M17.12 PRIMARY OSTEOARTHRITIS OF LEFT KNEE: ICD-10-CM

## 2019-05-01 PROCEDURE — 97140 MANUAL THERAPY 1/> REGIONS: CPT | Performed by: PHYSICAL THERAPIST

## 2019-05-01 PROCEDURE — 97110 THERAPEUTIC EXERCISES: CPT | Performed by: PHYSICAL THERAPIST

## 2019-05-01 PROCEDURE — 97112 NEUROMUSCULAR REEDUCATION: CPT | Performed by: PHYSICAL THERAPIST

## 2019-05-03 ENCOUNTER — TELEPHONE (OUTPATIENT)
Dept: UROLOGY | Facility: CLINIC | Age: 64
End: 2019-05-03

## 2019-05-03 ENCOUNTER — OFFICE VISIT (OUTPATIENT)
Dept: PHYSICAL THERAPY | Facility: CLINIC | Age: 64
End: 2019-05-03
Payer: MEDICARE

## 2019-05-03 DIAGNOSIS — M17.12 PRIMARY OSTEOARTHRITIS OF LEFT KNEE: ICD-10-CM

## 2019-05-03 DIAGNOSIS — N20.0 NEPHROLITHIASIS: Primary | ICD-10-CM

## 2019-05-03 DIAGNOSIS — Z96.652 PRESENCE OF LEFT ARTIFICIAL KNEE JOINT: Primary | ICD-10-CM

## 2019-05-03 DIAGNOSIS — R26.9 GAIT ABNORMALITY: ICD-10-CM

## 2019-05-03 PROCEDURE — 97112 NEUROMUSCULAR REEDUCATION: CPT | Performed by: PHYSICAL THERAPIST

## 2019-05-03 PROCEDURE — 97110 THERAPEUTIC EXERCISES: CPT | Performed by: PHYSICAL THERAPIST

## 2019-05-03 PROCEDURE — 97140 MANUAL THERAPY 1/> REGIONS: CPT | Performed by: PHYSICAL THERAPIST

## 2019-05-06 ENCOUNTER — APPOINTMENT (OUTPATIENT)
Dept: PHYSICAL THERAPY | Facility: CLINIC | Age: 64
End: 2019-05-06
Payer: MEDICARE

## 2019-05-08 ENCOUNTER — OFFICE VISIT (OUTPATIENT)
Dept: PHYSICAL THERAPY | Facility: CLINIC | Age: 64
End: 2019-05-08
Payer: MEDICARE

## 2019-05-08 ENCOUNTER — TRANSCRIBE ORDERS (OUTPATIENT)
Dept: PHYSICAL THERAPY | Facility: CLINIC | Age: 64
End: 2019-05-08

## 2019-05-08 DIAGNOSIS — Z96.652 PRESENCE OF LEFT ARTIFICIAL KNEE JOINT: Primary | ICD-10-CM

## 2019-05-08 DIAGNOSIS — M17.12 PRIMARY OSTEOARTHRITIS OF LEFT KNEE: ICD-10-CM

## 2019-05-08 DIAGNOSIS — R26.9 GAIT ABNORMALITY: ICD-10-CM

## 2019-05-08 PROCEDURE — 97112 NEUROMUSCULAR REEDUCATION: CPT

## 2019-05-08 PROCEDURE — 97150 GROUP THERAPEUTIC PROCEDURES: CPT

## 2019-05-08 PROCEDURE — 97140 MANUAL THERAPY 1/> REGIONS: CPT

## 2019-05-08 PROCEDURE — 97110 THERAPEUTIC EXERCISES: CPT

## 2019-05-10 ENCOUNTER — OFFICE VISIT (OUTPATIENT)
Dept: PHYSICAL THERAPY | Facility: CLINIC | Age: 64
End: 2019-05-10
Payer: MEDICARE

## 2019-05-10 DIAGNOSIS — Z96.652 PRESENCE OF LEFT ARTIFICIAL KNEE JOINT: Primary | ICD-10-CM

## 2019-05-10 DIAGNOSIS — M17.12 PRIMARY OSTEOARTHRITIS OF LEFT KNEE: ICD-10-CM

## 2019-05-10 DIAGNOSIS — R26.9 GAIT ABNORMALITY: ICD-10-CM

## 2019-05-10 PROCEDURE — 97140 MANUAL THERAPY 1/> REGIONS: CPT | Performed by: PHYSICAL THERAPIST

## 2019-05-10 PROCEDURE — 97112 NEUROMUSCULAR REEDUCATION: CPT | Performed by: PHYSICAL THERAPIST

## 2019-05-10 PROCEDURE — 97110 THERAPEUTIC EXERCISES: CPT | Performed by: PHYSICAL THERAPIST

## 2019-05-13 ENCOUNTER — OFFICE VISIT (OUTPATIENT)
Dept: PHYSICAL THERAPY | Facility: CLINIC | Age: 64
End: 2019-05-13
Payer: MEDICARE

## 2019-05-13 DIAGNOSIS — R26.9 GAIT ABNORMALITY: ICD-10-CM

## 2019-05-13 DIAGNOSIS — M17.12 PRIMARY OSTEOARTHRITIS OF LEFT KNEE: ICD-10-CM

## 2019-05-13 DIAGNOSIS — Z96.652 PRESENCE OF LEFT ARTIFICIAL KNEE JOINT: Primary | ICD-10-CM

## 2019-05-13 PROCEDURE — 97140 MANUAL THERAPY 1/> REGIONS: CPT | Performed by: PHYSICAL THERAPIST

## 2019-05-13 PROCEDURE — 97535 SELF CARE MNGMENT TRAINING: CPT | Performed by: PHYSICAL THERAPIST

## 2019-05-13 PROCEDURE — 97110 THERAPEUTIC EXERCISES: CPT | Performed by: PHYSICAL THERAPIST

## 2019-05-15 ENCOUNTER — HOSPITAL ENCOUNTER (EMERGENCY)
Facility: HOSPITAL | Age: 64
Discharge: HOME | End: 2019-05-15
Attending: EMERGENCY MEDICINE
Payer: MEDICARE

## 2019-05-15 ENCOUNTER — APPOINTMENT (EMERGENCY)
Dept: ULTRASOUND IMAGING | Facility: HOSPITAL | Age: 64
End: 2019-05-15
Payer: MEDICARE

## 2019-05-15 ENCOUNTER — APPOINTMENT (EMERGENCY)
Dept: RADIOLOGY | Facility: HOSPITAL | Age: 64
End: 2019-05-15
Payer: MEDICARE

## 2019-05-15 ENCOUNTER — OFFICE VISIT (OUTPATIENT)
Dept: PHYSICAL THERAPY | Facility: CLINIC | Age: 64
End: 2019-05-15
Payer: MEDICARE

## 2019-05-15 VITALS
RESPIRATION RATE: 18 BRPM | OXYGEN SATURATION: 97 % | WEIGHT: 198.19 LBS | DIASTOLIC BLOOD PRESSURE: 69 MMHG | SYSTOLIC BLOOD PRESSURE: 127 MMHG | BODY MASS INDEX: 33.84 KG/M2 | HEART RATE: 92 BPM | HEIGHT: 64 IN | TEMPERATURE: 98.7 F

## 2019-05-15 DIAGNOSIS — Z96.652 PRESENCE OF LEFT ARTIFICIAL KNEE JOINT: Primary | ICD-10-CM

## 2019-05-15 DIAGNOSIS — M17.12 PRIMARY OSTEOARTHRITIS OF LEFT KNEE: ICD-10-CM

## 2019-05-15 DIAGNOSIS — R26.9 GAIT ABNORMALITY: ICD-10-CM

## 2019-05-15 DIAGNOSIS — N20.1 URETEROLITHIASIS: Primary | ICD-10-CM

## 2019-05-15 LAB
ANION GAP SERPL CALCULATED.3IONS-SCNC: 8 MMOL/L (ref 4–13)
BACTERIA UR QL AUTO: ABNORMAL /HPF
BASOPHILS # BLD AUTO: 0.03 THOUSANDS/ΜL (ref 0–0.1)
BASOPHILS NFR BLD AUTO: 0 % (ref 0–1)
BILIRUB UR QL STRIP: NEGATIVE
BUN SERPL-MCNC: 13 MG/DL (ref 5–25)
CALCIUM SERPL-MCNC: 9.8 MG/DL (ref 8.3–10.1)
CHLORIDE SERPL-SCNC: 102 MMOL/L (ref 100–108)
CLARITY UR: CLEAR
CO2 SERPL-SCNC: 29 MMOL/L (ref 21–32)
COLOR UR: YELLOW
CREAT SERPL-MCNC: 1.26 MG/DL (ref 0.6–1.3)
EOSINOPHIL # BLD AUTO: 0.08 THOUSAND/ΜL (ref 0–0.61)
EOSINOPHIL NFR BLD AUTO: 1 % (ref 0–6)
ERYTHROCYTE [DISTWIDTH] IN BLOOD BY AUTOMATED COUNT: 12.7 % (ref 11.6–15.1)
GFR SERPL CREATININE-BSD FRML MDRD: 45 ML/MIN/1.73SQ M
GLUCOSE SERPL-MCNC: 104 MG/DL (ref 65–140)
GLUCOSE UR STRIP-MCNC: NEGATIVE MG/DL
HCT VFR BLD AUTO: 44.4 % (ref 34.8–46.1)
HGB BLD-MCNC: 14.3 G/DL (ref 11.5–15.4)
HGB UR QL STRIP.AUTO: ABNORMAL
IMM GRANULOCYTES # BLD AUTO: 0.02 THOUSAND/UL (ref 0–0.2)
IMM GRANULOCYTES NFR BLD AUTO: 0 % (ref 0–2)
KETONES UR STRIP-MCNC: NEGATIVE MG/DL
LEUKOCYTE ESTERASE UR QL STRIP: NEGATIVE
LYMPHOCYTES # BLD AUTO: 2.33 THOUSANDS/ΜL (ref 0.6–4.47)
LYMPHOCYTES NFR BLD AUTO: 25 % (ref 14–44)
MAGNESIUM SERPL-MCNC: 1.7 MG/DL (ref 1.6–2.6)
MCH RBC QN AUTO: 32.7 PG (ref 26.8–34.3)
MCHC RBC AUTO-ENTMCNC: 32.2 G/DL (ref 31.4–37.4)
MCV RBC AUTO: 102 FL (ref 82–98)
MONOCYTES # BLD AUTO: 0.67 THOUSAND/ΜL (ref 0.17–1.22)
MONOCYTES NFR BLD AUTO: 7 % (ref 4–12)
NEUTROPHILS # BLD AUTO: 6.34 THOUSANDS/ΜL (ref 1.85–7.62)
NEUTS SEG NFR BLD AUTO: 67 % (ref 43–75)
NITRITE UR QL STRIP: NEGATIVE
NON-SQ EPI CELLS URNS QL MICRO: ABNORMAL /HPF
NRBC BLD AUTO-RTO: 0 /100 WBCS
PH UR STRIP.AUTO: 5 [PH]
PLATELET # BLD AUTO: 223 THOUSANDS/UL (ref 149–390)
PMV BLD AUTO: 11.1 FL (ref 8.9–12.7)
POTASSIUM SERPL-SCNC: 3.3 MMOL/L (ref 3.5–5.3)
PROT UR STRIP-MCNC: NEGATIVE MG/DL
RBC # BLD AUTO: 4.37 MILLION/UL (ref 3.81–5.12)
RBC #/AREA URNS AUTO: ABNORMAL /HPF
SODIUM SERPL-SCNC: 139 MMOL/L (ref 136–145)
SP GR UR STRIP.AUTO: <=1.005 (ref 1–1.03)
UROBILINOGEN UR QL STRIP.AUTO: 0.2 E.U./DL
WBC # BLD AUTO: 9.47 THOUSAND/UL (ref 4.31–10.16)
WBC #/AREA URNS AUTO: ABNORMAL /HPF

## 2019-05-15 PROCEDURE — 99285 EMERGENCY DEPT VISIT HI MDM: CPT | Performed by: EMERGENCY MEDICINE

## 2019-05-15 PROCEDURE — 81001 URINALYSIS AUTO W/SCOPE: CPT | Performed by: EMERGENCY MEDICINE

## 2019-05-15 PROCEDURE — 85025 COMPLETE CBC W/AUTO DIFF WBC: CPT | Performed by: EMERGENCY MEDICINE

## 2019-05-15 PROCEDURE — 80048 BASIC METABOLIC PNL TOTAL CA: CPT | Performed by: EMERGENCY MEDICINE

## 2019-05-15 PROCEDURE — 97110 THERAPEUTIC EXERCISES: CPT | Performed by: PHYSICAL THERAPIST

## 2019-05-15 PROCEDURE — 36415 COLL VENOUS BLD VENIPUNCTURE: CPT | Performed by: EMERGENCY MEDICINE

## 2019-05-15 PROCEDURE — 74018 RADEX ABDOMEN 1 VIEW: CPT

## 2019-05-15 PROCEDURE — 96374 THER/PROPH/DIAG INJ IV PUSH: CPT

## 2019-05-15 PROCEDURE — 99284 EMERGENCY DEPT VISIT MOD MDM: CPT

## 2019-05-15 PROCEDURE — 76770 US EXAM ABDO BACK WALL COMP: CPT

## 2019-05-15 PROCEDURE — 96361 HYDRATE IV INFUSION ADD-ON: CPT

## 2019-05-15 PROCEDURE — 97140 MANUAL THERAPY 1/> REGIONS: CPT | Performed by: PHYSICAL THERAPIST

## 2019-05-15 PROCEDURE — 96375 TX/PRO/DX INJ NEW DRUG ADDON: CPT

## 2019-05-15 PROCEDURE — 83735 ASSAY OF MAGNESIUM: CPT | Performed by: EMERGENCY MEDICINE

## 2019-05-15 RX ORDER — NAPROXEN 375 MG/1
375 TABLET ORAL 2 TIMES DAILY PRN
Qty: 20 TABLET | Refills: 0 | Status: SHIPPED | OUTPATIENT
Start: 2019-05-15

## 2019-05-15 RX ORDER — OXYCODONE HYDROCHLORIDE 5 MG/1
5 TABLET ORAL EVERY 8 HOURS PRN
Qty: 11 TABLET | Refills: 0 | Status: SHIPPED | OUTPATIENT
Start: 2019-05-15

## 2019-05-15 RX ORDER — FENTANYL CITRATE 50 UG/ML
50 INJECTION, SOLUTION INTRAMUSCULAR; INTRAVENOUS ONCE
Status: COMPLETED | OUTPATIENT
Start: 2019-05-15 | End: 2019-05-15

## 2019-05-15 RX ORDER — ONDANSETRON 8 MG/1
8 TABLET, ORALLY DISINTEGRATING ORAL EVERY 8 HOURS PRN
Qty: 20 TABLET | Refills: 0 | Status: SHIPPED | OUTPATIENT
Start: 2019-05-15

## 2019-05-15 RX ORDER — OXYCODONE AND ACETAMINOPHEN 10; 325 MG/1; MG/1
1 TABLET ORAL
COMMUNITY

## 2019-05-15 RX ORDER — ONDANSETRON 2 MG/ML
4 INJECTION INTRAMUSCULAR; INTRAVENOUS ONCE
Status: COMPLETED | OUTPATIENT
Start: 2019-05-15 | End: 2019-05-15

## 2019-05-15 RX ORDER — KETOROLAC TROMETHAMINE 30 MG/ML
10 INJECTION, SOLUTION INTRAMUSCULAR; INTRAVENOUS ONCE
Status: COMPLETED | OUTPATIENT
Start: 2019-05-15 | End: 2019-05-15

## 2019-05-15 RX ADMIN — ONDANSETRON 4 MG: 2 INJECTION INTRAMUSCULAR; INTRAVENOUS at 16:01

## 2019-05-15 RX ADMIN — FENTANYL CITRATE 50 MCG: 50 INJECTION, SOLUTION INTRAMUSCULAR; INTRAVENOUS at 16:01

## 2019-05-15 RX ADMIN — SODIUM CHLORIDE 1000 ML: 0.9 INJECTION, SOLUTION INTRAVENOUS at 16:00

## 2019-05-15 RX ADMIN — KETOROLAC TROMETHAMINE 9.9 MG: 30 INJECTION, SOLUTION INTRAMUSCULAR at 16:00

## 2019-05-16 ENCOUNTER — TELEPHONE (OUTPATIENT)
Dept: UROLOGY | Facility: AMBULATORY SURGERY CENTER | Age: 64
End: 2019-05-16

## 2019-05-17 ENCOUNTER — OFFICE VISIT (OUTPATIENT)
Dept: PHYSICAL THERAPY | Facility: CLINIC | Age: 64
End: 2019-05-17
Payer: MEDICARE

## 2019-05-17 DIAGNOSIS — M17.12 PRIMARY OSTEOARTHRITIS OF LEFT KNEE: ICD-10-CM

## 2019-05-17 DIAGNOSIS — Z96.652 PRESENCE OF LEFT ARTIFICIAL KNEE JOINT: Primary | ICD-10-CM

## 2019-05-17 DIAGNOSIS — R26.9 GAIT ABNORMALITY: ICD-10-CM

## 2019-05-17 PROCEDURE — 97140 MANUAL THERAPY 1/> REGIONS: CPT | Performed by: PHYSICAL THERAPIST

## 2019-05-17 PROCEDURE — 97110 THERAPEUTIC EXERCISES: CPT | Performed by: PHYSICAL THERAPIST

## 2019-05-20 ENCOUNTER — OFFICE VISIT (OUTPATIENT)
Dept: PHYSICAL THERAPY | Facility: CLINIC | Age: 64
End: 2019-05-20
Payer: MEDICARE

## 2019-05-20 DIAGNOSIS — M17.12 PRIMARY OSTEOARTHRITIS OF LEFT KNEE: ICD-10-CM

## 2019-05-20 DIAGNOSIS — R26.9 GAIT ABNORMALITY: ICD-10-CM

## 2019-05-20 DIAGNOSIS — Z96.652 PRESENCE OF LEFT ARTIFICIAL KNEE JOINT: Primary | ICD-10-CM

## 2019-05-20 PROCEDURE — 97150 GROUP THERAPEUTIC PROCEDURES: CPT

## 2019-05-20 PROCEDURE — 97140 MANUAL THERAPY 1/> REGIONS: CPT

## 2019-05-21 ENCOUNTER — OFFICE VISIT (OUTPATIENT)
Dept: UROLOGY | Facility: CLINIC | Age: 64
End: 2019-05-21
Payer: MEDICARE

## 2019-05-21 VITALS
SYSTOLIC BLOOD PRESSURE: 120 MMHG | DIASTOLIC BLOOD PRESSURE: 76 MMHG | WEIGHT: 189 LBS | HEART RATE: 68 BPM | BODY MASS INDEX: 32.44 KG/M2

## 2019-05-21 DIAGNOSIS — N20.0 KIDNEY STONE: Primary | ICD-10-CM

## 2019-05-21 PROCEDURE — 99214 OFFICE O/P EST MOD 30 MIN: CPT | Performed by: PHYSICIAN ASSISTANT

## 2019-05-21 RX ORDER — TAMSULOSIN HYDROCHLORIDE 0.4 MG/1
0.4 CAPSULE ORAL
Qty: 14 CAPSULE | Refills: 0 | Status: SHIPPED | OUTPATIENT
Start: 2019-05-21 | End: 2019-06-04

## 2019-05-22 ENCOUNTER — OFFICE VISIT (OUTPATIENT)
Dept: PHYSICAL THERAPY | Facility: CLINIC | Age: 64
End: 2019-05-22
Payer: MEDICARE

## 2019-05-22 DIAGNOSIS — Z96.652 PRESENCE OF LEFT ARTIFICIAL KNEE JOINT: Primary | ICD-10-CM

## 2019-05-22 DIAGNOSIS — R26.9 GAIT ABNORMALITY: ICD-10-CM

## 2019-05-22 DIAGNOSIS — M17.12 PRIMARY OSTEOARTHRITIS OF LEFT KNEE: ICD-10-CM

## 2019-05-22 PROCEDURE — 97112 NEUROMUSCULAR REEDUCATION: CPT

## 2019-05-22 PROCEDURE — 97110 THERAPEUTIC EXERCISES: CPT

## 2019-05-22 PROCEDURE — 97150 GROUP THERAPEUTIC PROCEDURES: CPT

## 2019-05-22 PROCEDURE — 97140 MANUAL THERAPY 1/> REGIONS: CPT

## 2019-05-24 ENCOUNTER — EVALUATION (OUTPATIENT)
Dept: PHYSICAL THERAPY | Facility: CLINIC | Age: 64
End: 2019-05-24
Payer: MEDICARE

## 2019-05-24 DIAGNOSIS — Z96.652 PRESENCE OF LEFT ARTIFICIAL KNEE JOINT: Primary | ICD-10-CM

## 2019-05-24 DIAGNOSIS — M17.12 PRIMARY OSTEOARTHRITIS OF LEFT KNEE: ICD-10-CM

## 2019-05-24 DIAGNOSIS — R26.9 GAIT ABNORMALITY: ICD-10-CM

## 2019-05-24 PROCEDURE — 97140 MANUAL THERAPY 1/> REGIONS: CPT | Performed by: PHYSICAL THERAPIST

## 2019-05-24 PROCEDURE — 97112 NEUROMUSCULAR REEDUCATION: CPT | Performed by: PHYSICAL THERAPIST

## 2019-05-28 ENCOUNTER — TELEPHONE (OUTPATIENT)
Dept: NEPHROLOGY | Facility: CLINIC | Age: 64
End: 2019-05-28

## 2019-05-29 ENCOUNTER — CONSULT (OUTPATIENT)
Dept: NEPHROLOGY | Facility: CLINIC | Age: 64
End: 2019-05-29
Payer: MEDICARE

## 2019-05-29 ENCOUNTER — OFFICE VISIT (OUTPATIENT)
Dept: PHYSICAL THERAPY | Facility: CLINIC | Age: 64
End: 2019-05-29
Payer: MEDICARE

## 2019-05-29 VITALS
WEIGHT: 190 LBS | DIASTOLIC BLOOD PRESSURE: 80 MMHG | BODY MASS INDEX: 32.44 KG/M2 | SYSTOLIC BLOOD PRESSURE: 122 MMHG | HEIGHT: 64 IN | HEART RATE: 70 BPM

## 2019-05-29 DIAGNOSIS — M17.12 PRIMARY OSTEOARTHRITIS OF LEFT KNEE: ICD-10-CM

## 2019-05-29 DIAGNOSIS — Z96.652 PRESENCE OF LEFT ARTIFICIAL KNEE JOINT: Primary | ICD-10-CM

## 2019-05-29 DIAGNOSIS — N20.0 NEPHROLITHIASIS: ICD-10-CM

## 2019-05-29 DIAGNOSIS — R26.9 GAIT ABNORMALITY: ICD-10-CM

## 2019-05-29 PROCEDURE — 97110 THERAPEUTIC EXERCISES: CPT | Performed by: PHYSICAL THERAPIST

## 2019-05-29 PROCEDURE — 97535 SELF CARE MNGMENT TRAINING: CPT | Performed by: PHYSICAL THERAPIST

## 2019-05-29 PROCEDURE — 97140 MANUAL THERAPY 1/> REGIONS: CPT | Performed by: PHYSICAL THERAPIST

## 2019-05-29 PROCEDURE — 99204 OFFICE O/P NEW MOD 45 MIN: CPT | Performed by: INTERNAL MEDICINE

## 2019-05-29 RX ORDER — TRAMADOL HYDROCHLORIDE 50 MG/1
50 TABLET ORAL EVERY 6 HOURS PRN
COMMUNITY

## 2019-05-31 ENCOUNTER — HOSPITAL ENCOUNTER (OUTPATIENT)
Dept: ULTRASOUND IMAGING | Facility: HOSPITAL | Age: 64
Discharge: HOME | End: 2019-05-31
Payer: MEDICARE

## 2019-05-31 ENCOUNTER — APPOINTMENT (OUTPATIENT)
Dept: LAB | Facility: HOSPITAL | Age: 64
End: 2019-05-31
Attending: INTERNAL MEDICINE
Payer: MEDICARE

## 2019-05-31 DIAGNOSIS — N20.0 KIDNEY STONE: ICD-10-CM

## 2019-05-31 DIAGNOSIS — N20.0 NEPHROLITHIASIS: ICD-10-CM

## 2019-05-31 PROCEDURE — 82131 AMINO ACIDS SINGLE QUANT: CPT

## 2019-05-31 PROCEDURE — 82436 ASSAY OF URINE CHLORIDE: CPT

## 2019-05-31 PROCEDURE — 84392 ASSAY OF URINE SULFATE: CPT

## 2019-05-31 PROCEDURE — 83735 ASSAY OF MAGNESIUM: CPT

## 2019-05-31 PROCEDURE — 82507 ASSAY OF CITRATE: CPT

## 2019-05-31 PROCEDURE — 82340 ASSAY OF CALCIUM IN URINE: CPT

## 2019-05-31 PROCEDURE — 76770 US EXAM ABDO BACK WALL COMP: CPT

## 2019-05-31 PROCEDURE — 81003 URINALYSIS AUTO W/O SCOPE: CPT

## 2019-05-31 PROCEDURE — 83945 ASSAY OF OXALATE: CPT

## 2019-05-31 PROCEDURE — 84105 ASSAY OF URINE PHOSPHORUS: CPT

## 2019-05-31 PROCEDURE — 84300 ASSAY OF URINE SODIUM: CPT

## 2019-05-31 PROCEDURE — 84560 ASSAY OF URINE/URIC ACID: CPT

## 2019-05-31 PROCEDURE — 83935 ASSAY OF URINE OSMOLALITY: CPT

## 2019-05-31 PROCEDURE — 82140 ASSAY OF AMMONIA: CPT

## 2019-05-31 PROCEDURE — 84133 ASSAY OF URINE POTASSIUM: CPT

## 2019-05-31 PROCEDURE — 82570 ASSAY OF URINE CREATININE: CPT

## 2019-06-07 ENCOUNTER — TELEPHONE (OUTPATIENT)
Dept: NEPHROLOGY | Facility: CLINIC | Age: 64
End: 2019-06-07

## 2019-06-07 LAB
AMMONIA 24H UR-MRATE: 20 MEQ/24 HR
AMMONIA UR-SCNC: ABNORMAL UG/DL
CA H2 PHOS DIHYD CRY URNS QL MICRO: 0.67 RATIO (ref 0–3)
CALCIUM 24H UR-MCNC: 6.6 MG/DL
CALCIUM 24H UR-MRATE: 132 MG/24 HR (ref 100–300)
CHLORIDE 24H UR-SCNC: 43 MMOL/L
CHLORIDE 24H UR-SRATE: 86 MMOL/24 HR (ref 110–250)
CITRATE 24H UR-MCNC: 459 MG/L
CITRATE 24H UR-MRATE: 918 MG/24 HR (ref 320–1240)
COM CRY STONE QL IR: 1.89 RATIO (ref 0–6)
CREAT 24H UR-MCNC: 51.4 MG/DL
CREAT 24H UR-MRATE: 1028 MG/24 HR (ref 800–1800)
CYSTINE 24H UR-MCNC: 3.97 MG/L
CYSTINE 24H UR-MRATE: 7.94 MG/24 HR (ref 10–100)
MAGNESIUM 24H UR-MRATE: 78 MG/24 HR (ref 12–293)
MAGNESIUM UR-MCNC: 3.9 MG/DL
NA URATE CRY STONE QL IR: 1.26 RATIO (ref 0–4)
OSMOLALITY UR: 346 MOSMOL/KG (ref 300–900)
OXALATE 24H UR-MRATE: 20 MG/24 HR (ref 4–31)
OXALATE UR-MCNC: 10 MG/L
PH 24H UR: 6.4 [PH]
PHOSPHATE 24H UR-MCNC: 29.9 MG/DL
PHOSPHATE 24H UR-MRATE: 598 MG/24 HR (ref 400–1300)
PLEASE NOTE (STONE RISK): ABNORMAL
POTASSIUM 24H UR-SCNC: 64.2 MMOL/24 HR (ref 25–125)
POTASSIUM UR-SCNC: 32.1 MMOL/L
PRESERVED URINE: 2000 ML/24 HR (ref 600–1600)
SODIUM 24H UR-SCNC: 48 MMOL/L
SODIUM 24H UR-SRATE: 96 MMOL/24 HR (ref 39–258)
SPECIMEN VOL 24H UR: 2000 ML/24 HR (ref 600–1600)
SULFATE 24H UR-MCNC: 20 MEQ/24 HR (ref 0–30)
SULFATE UR-MCNC: 10 MEQ/L
TRI-PHOS CRY STONE MICRO: 0.01 RATIO (ref 0–1)
URATE 24H UR-MCNC: 22.7 MG/DL
URATE 24H UR-MRATE: 454 MG/24 HR (ref 250–750)
URATE DIHYD CRY STONE QL IR: 0.41 RATIO (ref 0–1.2)

## 2019-06-13 ENCOUNTER — TELEPHONE (OUTPATIENT)
Dept: NEPHROLOGY | Facility: CLINIC | Age: 64
End: 2019-06-13

## 2019-06-17 ENCOUNTER — TELEPHONE (OUTPATIENT)
Dept: NEPHROLOGY | Facility: CLINIC | Age: 64
End: 2019-06-17

## 2019-06-17 DIAGNOSIS — N20.0 NEPHROLITHIASIS: Primary | ICD-10-CM

## 2019-06-24 ENCOUNTER — TELEPHONE (OUTPATIENT)
Dept: UROLOGY | Facility: AMBULATORY SURGERY CENTER | Age: 64
End: 2019-06-24

## 2019-06-26 ENCOUNTER — TRANSCRIBE ORDERS (OUTPATIENT)
Dept: PHYSICAL THERAPY | Facility: CLINIC | Age: 64
End: 2019-06-26

## 2019-06-26 DIAGNOSIS — Z96.652 PRESENCE OF LEFT ARTIFICIAL KNEE JOINT: Primary | ICD-10-CM

## 2019-06-26 DIAGNOSIS — M17.12 PRIMARY OSTEOARTHRITIS OF LEFT KNEE: ICD-10-CM

## 2019-06-26 DIAGNOSIS — R26.9 GAIT ABNORMALITY: ICD-10-CM

## 2019-10-03 NOTE — PROGRESS NOTES
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 and she does not want to be sick and or hospitalized during her craft show season  Patient stated she does not know how this medication is going to react to her and she would like to wait til February to start this medication and would like to know Dr Sahara Oconnor view on this  I informed patient he is not in our office until 10/9/19 and I will talk to him then and call her  Patient was ok with that    Printing and placing in Derm problem pile  Daily Note     Today's date: 2018  Patient name: Huey Bal  : 1955  MRN: [id number]  Referring provider: Morteza Castillo MD  Dx:   Encounter Diagnosis     ICD-10-CM    1  Balance problem R26 89    2  Presence of right artificial knee joint Z96 651    3  Acute pain of right knee M25 561                   Subjective:  Patient reports she was in the hospital over the weekend while suffering from kidney stones over the weekend  Patient reports she has been properly medicated to help with the pain but states she feels exhausted  Patient reports 2/10 pain levels in the right knee at the start of the session  Patient reports she does not think she will be able to tolerate a full session today because of recovering from the kidney stones but overall states she wanted to come in for therapy and try a few things to keep the knee moving  Post session the patient reports 1/10 soreness in the right knee         Objective: See treatment diary below    Precautions:  WBAT Right LE     Specialty Daily Treatment Diary     Manual        Bilat knee and LE stretching and mobs  15 min  15 min  15 min                                          Exercise Diary  12/7 12/12 12/17  12/3 12/5   Nu-step  10 min L5 10 min L5 10 min L5  10 min L5 10 min L5   Stand SLR all directions 2x10 Bilat  1 5#  2x10 Bilat   1 5#    NT 2x10 2x10  1 5#   Stand HR/TR On 4" step   2x10 Bilat  2x10 Bilat   4" step  2x10 Bilat  4" step  2x10 bilat  4" step 2x10 bilat  4" step     S/L Gastroc Stretch on step  10x10" Hold   Bilat  4" step  10x10" Hold  Bilat  4" step  10x10" Hold   Bilat  4" step  10x 10" hold  bilat   4" step 10x 10" hold  bilat  4" step   Front and lateral step-up  2x10 Bilat   6" step  2x10 Bilat   6" step  10x Bilat   4" step  10x  6" step x10  6" step   Step over  2x10 Bilat   6" step  2x10 Bilat  6" step  NT  10x bilat  6" step 2x10 bilat  6" step   SLS and tandem on foam  4x15" Hold   Bilat   Tandem only SLS 3x15" Hold  NT  Tandem  3x15" hold   bilat Tandem   5x 15" hold   bilat    Side step and squat   4x10 Feet   Light Blue  NT  4x 10 feet  Light blue     Monster walk 4x 10 feet  Light blue  4x10 Feet  Light Blue  NT  4x10 feet  Light blue  4x 10 feet  Light blue    Rocker board         High knee walk   4x10 Feet   2 0#  NT      Quad set         Heel slides with strap         Bridge with Tball  10X With Orange Tball  2x10 with Orange Tball NT   2x10 with   Orange Tball 2x10 with   [employer]         DKTC with Tball  2x10 5" Hold Orange Tball  2x10 5" hold  Orange Tball 2x10 5" Hold   Orange Tball  2x10 5" hold  Orange Tball 2x10 5" hold  Orange Tball   LTR with Tball   10x5" Hold   Orange Tball  2x10 5" Hold   Orange Tball                                  Modalities 12/7 12/12 12/17      MHP to Low back and bilateral knees  15 min   Used CP to R knee today 15 min with   MHP to the LB and left knee in seated  15 min MHP to the left knee and CP to the right knee                              Assessment: Tolerated treatment fair  PT notes modified treatment secondary to recovering from kidney stones but PT will continue to progress toward therapy goals and full TE session as tolerated by patient  PT notes the patient with c/o fatigue in the LE post minimal standing TE with need for continuation of skilled therapy  Plan: Continue per plan of care

## 2019-12-10 ENCOUNTER — TELEPHONE (OUTPATIENT)
Dept: UROLOGY | Facility: MEDICAL CENTER | Age: 64
End: 2019-12-10

## 2019-12-10 ENCOUNTER — TELEPHONE (OUTPATIENT)
Dept: UROLOGY | Facility: CLINIC | Age: 64
End: 2019-12-10

## 2019-12-10 DIAGNOSIS — N20.0 NEPHROLITHIASIS: Primary | ICD-10-CM

## 2019-12-10 NOTE — TELEPHONE ENCOUNTER
Patient seen by Ar De La Vega at [facility]  Patient reported to have passed a large stone 6 mm  Urinary issue include: flow is intermittent, pain in both sides  Pain rated 7/10  Requesting a lab slip to be entered Epic in order for the stone to be analyzed    Please call to discuss at [phone]  Thank you

## 2019-12-10 NOTE — TELEPHONE ENCOUNTER
Tried to call patient to inform her the stone order is in but the line was busy   Will try to call back later

## 2019-12-11 ENCOUNTER — APPOINTMENT (OUTPATIENT)
Dept: LAB | Facility: HOSPITAL | Age: 64
End: 2019-12-11
Payer: MEDICARE

## 2019-12-11 DIAGNOSIS — N20.0 NEPHROLITHIASIS: ICD-10-CM

## 2019-12-11 PROCEDURE — 82360 CALCULUS ASSAY QUANT: CPT

## 2019-12-11 NOTE — TELEPHONE ENCOUNTER
Called and spoke with patient  Informed patient that the order for stone analysis has been placed  Patient is at the hospital right now dropping off the stone

## 2019-12-12 ENCOUNTER — HOSPITAL ENCOUNTER (OUTPATIENT)
Dept: ULTRASOUND IMAGING | Facility: HOSPITAL | Age: 64
Discharge: HOME | End: 2019-12-12
Attending: INTERNAL MEDICINE
Payer: MEDICARE

## 2019-12-12 DIAGNOSIS — N20.0 NEPHROLITHIASIS: ICD-10-CM

## 2019-12-12 PROCEDURE — 76770 US EXAM ABDO BACK WALL COMP: CPT
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
## 2019-12-18 ENCOUNTER — TELEPHONE (OUTPATIENT)
Dept: NEPHROLOGY | Facility: CLINIC | Age: 64
End: 2019-12-18

## 2019-12-18 NOTE — TELEPHONE ENCOUNTER
Left message for the patient to call back in regards to her recent Kidney US results from Dr Ludmila Baker MA

## 2019-12-18 NOTE — TELEPHONE ENCOUNTER
----- Message from Mary Lowry MD sent at 12/18/2019  8:15 AM EST -----  Call and let her know no stones seen on ultrasound   ----- Message -----  From: Interface, Radiology Results In  Sent: 12/17/2019   3:06 PM EST  To: Mary Lowry MD

## 2019-12-18 NOTE — TELEPHONE ENCOUNTER
Spoke with the patient and she is aware of her US results and has no further questions at this time        Jeanette Issa MA

## 2019-12-20 LAB
CA PHOS MFR STONE: 5 %
CALCIUM OXALATE DIHYDRATE MFR STONE IR: 30 %
COLOR STONE: NORMAL
COM MFR STONE: 65 %
COMMENT-STONE3: NORMAL
COMPOSITION: NORMAL
LABORATORY COMMENT REPORT: NORMAL
NIDUS STONE QL: NORMAL
PHOTO: NORMAL
SIZE STONE: NORMAL MM
STONE ANALYSIS-IMP: NORMAL
SURFACE CRYSTALS: NORMAL
WT STONE: 87.5 MG

## 2019-12-23 ENCOUNTER — TELEPHONE (OUTPATIENT)
Dept: UROLOGY | Facility: CLINIC | Age: 64
End: 2019-12-23

## 2019-12-23 DIAGNOSIS — N20.0 NEPHROLITHIASIS: Primary | ICD-10-CM

## 2019-12-23 NOTE — TELEPHONE ENCOUNTER
Stone analysis resulted- calcium oxalate   Diet recommendations- low salt, low oxalate, add citrate like lemon to water, and hydrate 64oz+ water per day

## 2019-12-23 NOTE — TELEPHONE ENCOUNTER
Called and spoke to patient and informed her that the sone results are that the stone is calcium oxalate Diet recommendations- low salt, low oxalate, add citrate like lemon to water, and hydrate 64oz+ water per day    Patient stated she is still having pain (bilateral flank pain)  Patient is concerned for infection      Per Diana Jansen ordered a micro culture to test for infection

## 2020-02-10 ENCOUNTER — OFFICE VISIT (OUTPATIENT)
Dept: NEPHROLOGY | Facility: CLINIC | Age: 65
End: 2020-02-10
Payer: COMMERCIAL

## 2020-02-10 VITALS
BODY MASS INDEX: 34.15 KG/M2 | DIASTOLIC BLOOD PRESSURE: 70 MMHG | HEART RATE: 80 BPM | WEIGHT: 200 LBS | SYSTOLIC BLOOD PRESSURE: 120 MMHG | HEIGHT: 64 IN

## 2020-02-10 DIAGNOSIS — N20.0 NEPHROLITHIASIS: Primary | ICD-10-CM

## 2020-02-10 PROCEDURE — 99214 OFFICE O/P EST MOD 30 MIN: CPT | Performed by: INTERNAL MEDICINE

## 2020-02-10 PROCEDURE — 1036F TOBACCO NON-USER: CPT | Performed by: INTERNAL MEDICINE

## 2020-02-10 PROCEDURE — 3008F BODY MASS INDEX DOCD: CPT | Performed by: INTERNAL MEDICINE

## 2020-02-10 NOTE — LETTER
February 10, 2020     Charito Price DO  [facility]    Patient: Rex Race   YOB: 1955   Date of Visit: 2/10/2020       Dear Dr Jaison Clayton: Thank you for referring Julieta Knott to me for evaluation  Below are my notes for this consultation  If you have questions, please do not hesitate to call me  I look forward to following your patient along with you  Sincerely,        Sanju Burleson MD        CC: No Recipients  Sanju Burleson MD  2/10/2020  2:09 PM  Sign at close encounter  [address] 62 y o  female MRN: [id number]  Unit/Bed#:  Encounter: [id number]  Reason for Consult:  Nephrolithiasis    The patient is here for follow-up  Since I have last seen her she did pass a kidney stone that was her last 1 that was present in the kidney  Otherwise she has been doing well except for her fibromyalgia but no other complaints  ASSESSMENT/PLAN:  1  Nephrolithiasis    The patient's history of calcium oxalate nephrolithiasis that started in 2018 with a clinical event after her 1st knee surgery  She then passed a couple others after that  When I initially saw her she had one 6 mm stone that was present and she passed that recently and her most recent renal ultrasound showed no stones in either kidney  She did do a 24 hour urine collection and there was no specific abnormalities with respect urinary calcium, oxalate or citrate which were all in the normal range  The patient had told me that she had a lifestyle  and prior to her developing kidney stones had changed her diet significantly and she had a lot of high oxalate containing foods that she was eating  Since her kidney stone development she has cut out all those foods as well and that likely is the reason while there is no abnormalities as at least from her history of 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    Since there is no specific medical treatment indicated given the normality of the you above factors were just going to observe on low oxalate diet  I will have her do another ultrasound in 1 year to see if any stones developed  I did tell her that if she wants to introduce a very small amount of these foods she should take a magnesium tablet with the meal to bind the oxalate the intestine  She understands not to go over board and to do this in a limited fashion  Renal function electrolytes were normal     Renal ultrasound 1 year to evaluate for any stone development  Continue low oxalate diet as above  Continue to keep hydrated so that you make at least 2 L of urine per day  She was instructed to call me if she has any questions or kidney stone event before her ultrasound  SUBJECTIVE:  Review of Systems   Constitution: Negative for chills, decreased appetite, fever and night sweats  HENT: Negative  Eyes: Negative  Cardiovascular: Negative  Negative for chest pain, dyspnea on exertion, leg swelling, orthopnea and palpitations  Respiratory: Negative  Negative for cough, shortness of breath, sputum production and wheezing  Gastrointestinal: Negative  Negative for bloating, abdominal pain, diarrhea, nausea and vomiting  Genitourinary: Negative for bladder incontinence, dysuria, hematuria and incomplete emptying  Neurological: Positive for paresthesias  Negative for dizziness, focal weakness and seizures  Has fibromyalgia  Psychiatric/Behavioral: Negative  OBJECTIVE:  Current Weight: Weight - Scale: 90 7 kg (200 lb)  [email or web address]:     Blood pressure 120/70, pulse 80, height 5' 4" (1 626 m), weight 90 7 kg (200 lb)  , Body mass index is 34 33 kg/m²      [unfilled]    Physical Exam: /70 (BP Location: Right arm, Patient Position: Sitting, Cuff Size: Standard)   Pulse 80   Ht 5' 4" (1 626 m)   Wt 90 7 kg (200 lb)   BMI 34 33 kg/m²    Physical Exam   Constitutional: She is oriented to person, place, and time  She appears well-nourished  No distress  HENT:   Head: Atraumatic  Mouth/Throat: Oropharynx is clear and moist    Eyes: EOM are normal  No scleral icterus  Neck: Neck supple  No JVD present  Cardiovascular: Normal rate and regular rhythm  Exam reveals no gallop and no friction rub  Pulmonary/Chest: Effort normal and breath sounds normal  No respiratory distress  She has no wheezes  She has no rales  Abdominal: Soft  Bowel sounds are normal  She exhibits no distension  There is no tenderness  There is no rebound  Neurological: She is alert and oriented to person, place, and time  Skin: She is not diaphoretic  Psychiatric: She has a normal mood and affect         Medications:    Current Outpatient Medications:     DULoxetine (CYMBALTA) 60 mg delayed release capsule, Take 1 capsule by mouth daily, Disp: , Rfl:     ergocalciferol (VITAMIN D2) 50,000 units, Take 1 capsule by mouth once a week, Disp: , Rfl:     Lido-Capsaicin-Men-Methyl Sal (MEDI-PATCH-LIDOCAINE EX), Apply topically, Disp: , Rfl:     MISC NATURAL PRODUCT OP, , Disp: , Rfl:     traZODone (DESYREL) 150 mg tablet, Take 100 mg by mouth daily at bedtime , Disp: , Rfl:     bupivacaine 0 5% and lidocaine 2% (1:1)10 ml with dexamethasone 4mg, 2 mL, Disp: , Rfl:     naproxen (NAPROSYN) 375 mg tablet, Take 1 tablet (375 mg total) by mouth 2 (two) times a day as needed for mild pain (Patient not taking: Reported on 2/10/2020), Disp: 20 tablet, Rfl: 0    ondansetron (ZOFRAN-ODT) 8 mg disintegrating tablet, Take 1 tablet (8 mg total) by mouth every 8 (eight) hours as needed for nausea or vomiting (Patient not taking: Reported on 2/10/2020), Disp: 20 tablet, Rfl: 0    oxyCODONE (ROXICODONE) 5 mg immediate release tablet, Take 1 tablet (5 mg total) by mouth every 8 (eight) hours as needed for moderate pain (Do not drive or drink alcohol while using)Max Daily Amount: 15 mg (Patient not taking: Reported on 2/10/2020), Disp: 11 tablet, Rfl: 0    oxyCODONE-acetaminophen (PERCOCET)  mg per tablet, Take 1 tablet by mouth, Disp: , Rfl:     tamsulosin (FLOMAX) 0 4 mg, Take 1 capsule (0 4 mg total) by mouth daily with dinner for 14 days (Patient not taking: Reported on 2/10/2020), Disp: 14 capsule, Rfl: 0    traMADol (ULTRAM) 50 mg tablet, Take 50 mg by mouth every 6 (six) hours as needed for moderate pain, Disp: , Rfl:     Laboratory Results:  Lab Results   Component Value Date    WBC 9 47 05/15/2019    HGB 14 3 05/15/2019    HCT 44 4 05/15/2019     (H) 05/15/2019     05/15/2019     Lab Results   Component Value Date    SODIUM 139 05/15/2019    K 3 3 (L) 05/15/2019     05/15/2019    CO2 29 05/15/2019    BUN 13 05/15/2019    CREATININE 1 26 05/15/2019    GLUC 104 05/15/2019    CALCIUM 9 8 05/15/2019     Lab Results   Component Value Date    CALCIUM 9 8 05/15/2019    PHOS 3 9 10/21/2015     No results found for: LABPROT

## 2020-02-10 NOTE — PATIENT INSTRUCTIONS
You are here for follow-up in you did tell me that you recently passed 1 stone that was remaining in your kidney  Your latest ultrasound showed no kidney stones so we are starting with a clean slate  We performed her 24 hour urine collection and there was no identifiable abnormality with respect oxalate, citrate or Calcium  I say those 3 things because those at the [employer] mild heels that are involved in calcium oxalate stone formation which you had  Re-investigating her dietary changes in our discussion makes me believe that it may have been due to high oxalate foods prior to developing her for stones  At this point just continue to keep herself hydrated  If you want to have some of those foods that contain oxalate I would take a magnesium tablet with the meal and that will help by the oxalate in your intestines  You do understand that your going to do this a little bit at a time and not overwhelm the oxalate and your system  Your kidney function was normal on the blood work    In 1 year obtain kidney ultrasound and will see if any have formed and if none do that likely was related to the dietary oxalate  No medical treatment is necessary at this time

## 2020-02-10 NOTE — PROGRESS NOTES
NEPHROLOGY PROGRESS NOTE    Tab Isaac 62 y o  female MRN: [id number]  Unit/Bed#:  Encounter: [id number]  Reason for Consult:  Nephrolithiasis    The patient is here for follow-up  Since I have last seen her she did pass a kidney stone that was her last 1 that was present in the kidney  Otherwise she has been doing well except for her fibromyalgia but no other complaints  ASSESSMENT/PLAN:  1  Nephrolithiasis    The patient's history of calcium oxalate nephrolithiasis that started in 2018 with a clinical event after her 1st knee surgery  She then passed a couple others after that  When I initially saw her she had one 6 mm stone that was present and she passed that recently and her most recent renal ultrasound showed no stones in either kidney  She did do a 24 hour urine collection and there was no specific abnormalities with respect urinary calcium, oxalate or citrate which were all in the normal range  The patient had told me that she had a lifestyle  and prior to her developing kidney stones had changed her diet significantly and she had a lot of high oxalate containing foods that she was eating  Since her kidney stone development she has cut out all those foods as well and that likely is the reason while there is no abnormalities as at least from her history of 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  Since there is no specific medical treatment indicated given the normality of the you above factors were just going to observe on low oxalate diet  I will have her do another ultrasound in 1 year to see if any stones developed  I did tell her that if she wants to introduce a very small amount of these foods she should take a magnesium tablet with the meal to bind the oxalate the intestine  She understands not to go over board and to do this in a limited fashion    Renal function electrolytes were normal     Renal ultrasound 1 year to evaluate for any stone development  Continue low oxalate diet as above  Continue to keep hydrated so that you make at least 2 L of urine per day  She was instructed to call me if she has any questions or kidney stone event before her ultrasound  SUBJECTIVE:  Review of Systems   Constitution: Negative for chills, decreased appetite, fever and night sweats  HENT: Negative  Eyes: Negative  Cardiovascular: Negative  Negative for chest pain, dyspnea on exertion, leg swelling, orthopnea and palpitations  Respiratory: Negative  Negative for cough, shortness of breath, sputum production and wheezing  Gastrointestinal: Negative  Negative for bloating, abdominal pain, diarrhea, nausea and vomiting  Genitourinary: Negative for bladder incontinence, dysuria, hematuria and incomplete emptying  Neurological: Positive for paresthesias  Negative for dizziness, focal weakness and seizures  Has fibromyalgia  Psychiatric/Behavioral: Negative  OBJECTIVE:  Current Weight: Weight - Scale: 90 7 kg (200 lb)  [email or web address]     Blood pressure 120/70, pulse 80, height 5' 4" (1 626 m), weight 90 7 kg (200 lb)  , Body mass index is 34 33 kg/m²  [unfilled]    Physical Exam: /70 (BP Location: Right arm, Patient Position: Sitting, Cuff Size: Standard)   Pulse 80   Ht 5' 4" (1 626 m)   Wt 90 7 kg (200 lb)   BMI 34 33 kg/m²   Physical Exam   Constitutional: She is oriented to person, place, and time  She appears well-nourished  No distress  HENT:   Head: Atraumatic  Mouth/Throat: Oropharynx is clear and moist    Eyes: EOM are normal  No scleral icterus  Neck: Neck supple  No JVD present  Cardiovascular: Normal rate and regular rhythm  Exam reveals no gallop and no friction rub  Pulmonary/Chest: Effort normal and breath sounds normal  No respiratory distress  She has no wheezes  She has no rales  Abdominal: Soft  Bowel sounds are normal  She exhibits no distension  There is no tenderness  There is no rebound  Neurological: She is alert and oriented to person, place, and time  Skin: She is not diaphoretic  Psychiatric: She has a normal mood and affect         Medications:    Current Outpatient Medications:     DULoxetine (CYMBALTA) 60 mg delayed release capsule, Take 1 capsule by mouth daily, Disp: , Rfl:     ergocalciferol (VITAMIN D2) 50,000 units, Take 1 capsule by mouth once a week, Disp: , Rfl:     Lido-Capsaicin-Men-Methyl Sal (MEDI-PATCH-LIDOCAINE EX), Apply topically, Disp: , Rfl:     MISC NATURAL PRODUCT OP, , Disp: , Rfl:     traZODone (DESYREL) 150 mg tablet, Take 100 mg by mouth daily at bedtime , Disp: , Rfl:     bupivacaine 0 5% and lidocaine 2% (1:1)10 ml with dexamethasone 4mg, 2 mL, Disp: , Rfl:     naproxen (NAPROSYN) 375 mg tablet, Take 1 tablet (375 mg total) by mouth 2 (two) times a day as needed for mild pain (Patient not taking: Reported on 2/10/2020), Disp: 20 tablet, Rfl: 0    ondansetron (ZOFRAN-ODT) 8 mg disintegrating tablet, Take 1 tablet (8 mg total) by mouth every 8 (eight) hours as needed for nausea or vomiting (Patient not taking: Reported on 2/10/2020), Disp: 20 tablet, Rfl: 0    oxyCODONE (ROXICODONE) 5 mg immediate release tablet, Take 1 tablet (5 mg total) by mouth every 8 (eight) hours as needed for moderate pain (Do not drive or drink alcohol while using)Max Daily Amount: 15 mg (Patient not taking: Reported on 2/10/2020), Disp: 11 tablet, Rfl: 0    oxyCODONE-acetaminophen (PERCOCET)  mg per tablet, Take 1 tablet by mouth, Disp: , Rfl:     tamsulosin (FLOMAX) 0 4 mg, Take 1 capsule (0 4 mg total) by mouth daily with dinner for 14 days (Patient not taking: Reported on 2/10/2020), Disp: 14 capsule, Rfl: 0    traMADol (ULTRAM) 50 mg tablet, Take 50 mg by mouth every 6 (six) hours as needed for moderate pain, Disp: , Rfl:     Laboratory Results:  Lab Results   Component Value Date    WBC 9 47 05/15/2019    HGB 14 3 05/15/2019    HCT 44 4 05/15/2019     (H) 05/15/2019     05/15/2019     Lab Results   Component Value Date    SODIUM 139 05/15/2019    K 3 3 (L) 05/15/2019     05/15/2019    CO2 29 05/15/2019    BUN 13 05/15/2019    CREATININE 1 26 05/15/2019    GLUC 104 05/15/2019    CALCIUM 9 8 05/15/2019     Lab Results   Component Value Date    CALCIUM 9 8 05/15/2019    PHOS 3 9 10/21/2015     No results found for: LABPROT

## 2021-02-24 ENCOUNTER — TELEPHONE (OUTPATIENT)
Dept: OBGYN CLINIC | Facility: CLINIC | Age: 66
End: 2021-02-24

## 2021-02-24 NOTE — TELEPHONE ENCOUNTER
2/24/2021 11:16 AM  FAXED COPY OF 1/31/2019 PAP&HPV REPORT TO [facility] Cleotis Husbands #[phone]   (Note-this report is in [facility])

## 2022-01-04 NOTE — PROGRESS NOTES
PT Evaluation     Today's date: 2022  Patient name: Joe Santiago  : 1955  MRN: [id number]  Referring provider: Leo Fay DO  Dx:   Encounter Diagnosis     ICD-10-CM    1  Balance problem  R26 89                   Assessment  Assessment details: PT notes the patient with WNL ROM but decrease strength t/o the bilateral hip and LE with demonstration of impaired gait pattern and balance leading to increase pain levels and functional limitations with need for course of skilled therapy for 8 weeks with focus on gait/balance, strengthening, manual therapy, analgesic modalities, and HEP  Impairments: abnormal gait, abnormal or restricted ROM, activity intolerance, impaired balance, impaired physical strength, lacks appropriate home exercise program and pain with function    Goals  ST  Initiate HEP  2  Decrease pain levels by 25-50%   3  Increase strength by 1-2 mm grades  4  Increase ROM by 25-50%   LT  Decrease limitations with standing, walking and stairs  2  Decrease limitations with transfers and ADL  3  Decrease limitations with squatting and lifting  4  DC with HEP    Plan  Plan details: PT notes review of POC and findings with patient who is in agreement with PT recommendations of course of skilled therapy  Patient would benefit from: PT eval and skilled physical therapy  Planned modality interventions: thermotherapy: hydrocollator packs  Planned therapy interventions: manual therapy, neuromuscular re-education, patient education, self care, strengthening, stretching, therapeutic exercise, home exercise program, graded exercise, gait training and balance  Frequency: 2x week  Duration in weeks: 8  Treatment plan discussed with: patient        Subjective Evaluation    History of Present Illness  Mechanism of injury: Patient reports a decline in walking about 6 months ago from insidious onset with balance deficits and decrease tolerance    Patient reports feeling her activity level has declined with COVID as well  Patient denies any dizziness or spinning but just of balance  Patient reports difficulty with standing, walking, transfers, stairs, ADL, balance, lifting and carrying  Patient went to PCP for f/u and spoke with MD about walking and was referred to OPPT to address gait/balance deficits  Patient reports she is retired with significant PMH of GERD, fibromyalgia, IBS, bilateral TKA, and chronic pain syndrome with medical marijuana use  Pain  Current pain ratin  At best pain ratin  At worst pain ratin  Location: Lumbar spine and bilateral hip   Quality: dull ache and discomfort  Relieving factors: rest  Aggravating factors: stair climbing, walking, standing and lifting    Treatments  Current treatment: physical therapy  Patient Goals  Patient goals for therapy: decreased pain, improved balance, increased motion, increased strength and independence with ADLs/IADLs          Objective     Tenderness   Left Knee   Tenderness in the lateral joint line and medial joint line  Right Knee   Tenderness in the lateral joint line and medial joint line       Neurological Testing     Reflexes   Left   Patellar (L4): normal (2+)  Achilles (S1): normal (2+)    Right   Patellar (L4): normal (2+)  Achilles (S1): normal (2+)    Active Range of Motion   Left Hip   Normal active range of motion    Right Hip   Normal active range of motion  Left Knee   Normal active range of motion    Right Knee   Normal active range of motion  Left Ankle/Foot   Normal active range of motion    Right Ankle/Foot   Normal active range of motion    Strength/Myotome Testing     Left Hip   Planes of Motion   Flexion: 4+  Extension: 5  Abduction: 4+  Adduction: 5  External rotation: 5  Internal rotation: 5    Right Hip   Planes of Motion   Flexion: 3+  Extension: 4  Abduction: 4-  Adduction: 4+  External rotation: 3+  Internal rotation: 3+    Left Knee   Flexion: 4+  Extension: 4+  Quadriceps contraction: good    Right Knee   Flexion: 4+  Extension: 4-  Quadriceps contraction: fair    Left Ankle/Foot   Normal strength    Right Ankle/Foot   Normal strength    Ambulation     Observational Gait   Gait: asymmetric   Decreased walking speed, stride length and right stance time  Additional Observational Gait Details  PT notes decrease hip and knee flex, decrease stance on the right, decrease step length and decrease shandra with rating of fair with static and dynamic activities               Precautions:  PMH Fibromyalgia, GERD, IBS, bilateral TKA, chronic pain       Manuals 1/5       Bilateral Quad, piriformis and gastroc with manual hip traction  5 min                                Neuro Re-Ed        Front lunge         Monster walk         Side step and squat         Backwards walk         Tandem and side step walk on foam         BOSU March         Front and retro step over        [place] and with ABD         Supine Tball ABD crunch                 Ther Ex        Nu-step 10 min L3        Stair HR/TR         Front and lateral step up                                         HEP update/review  15 min        Ther Activity                        Gait Training                        Modalities        MHP to the bilateral hip and/or knees PRN

## 2022-01-05 ENCOUNTER — EVALUATION (OUTPATIENT)
Dept: PHYSICAL THERAPY | Facility: CLINIC | Age: 67
End: 2022-01-05
Payer: COMMERCIAL

## 2022-01-05 DIAGNOSIS — R26.89 BALANCE PROBLEM: Primary | ICD-10-CM

## 2022-01-05 PROCEDURE — 97162 PT EVAL MOD COMPLEX 30 MIN: CPT | Performed by: PHYSICAL THERAPIST

## 2022-01-05 PROCEDURE — 97535 SELF CARE MNGMENT TRAINING: CPT | Performed by: PHYSICAL THERAPIST

## 2022-01-05 PROCEDURE — 97110 THERAPEUTIC EXERCISES: CPT | Performed by: PHYSICAL THERAPIST

## 2022-01-05 NOTE — LETTER
2022    Sandra Gamez DO  [address] [facility] [address]    Patient: Sharla Mckeon   YOB: 1955   Date of Visit: 2022     Encounter Diagnosis     ICD-10-CM    1  Balance problem  R26 89        Dear Dr Zan Ramirez:    Thank you for your recent referral of Sharla Mckeon  Please review the attached evaluation summary from Eve's recent visit  Please verify that you agree with the plan of care by signing the attached order  If you have any questions or concerns, please do not hesitate to call  I sincerely appreciate the opportunity to share in the care of one of your patients and hope to have another opportunity to work with you in the near future  Sincerely,    Twin Aldrich, PT      Referring Provider:      I certify that I have read the below Plan of Care and certify the need for these services furnished under this plan of treatment while under my care  Sandra Gamez DO  [address] [facility] [place] [address]  Via Fax: [phone]          PT Evaluation     Today's date: 2022  Patient name: Sharla Mckeon  : 1955  MRN: [id number]  Referring provider: Harriett Mcduffie DO  Dx:   Encounter Diagnosis     ICD-10-CM    1  Balance problem  R26 89                   Assessment  Assessment details: PT notes the patient with WNL ROM but decrease strength t/o the bilateral hip and LE with demonstration of impaired gait pattern and balance leading to increase pain levels and functional limitations with need for course of skilled therapy for 8 weeks with focus on gait/balance, strengthening, manual therapy, analgesic modalities, and HEP  Impairments: abnormal gait, abnormal or restricted ROM, activity intolerance, impaired balance, impaired physical strength, lacks appropriate home exercise program and pain with function    Goals  ST  Initiate HEP  2  Decrease pain levels by 25-50%   3    Increase strength by 1-2 mm grades  4  Increase ROM by 25-50%   LT  Decrease limitations with standing, walking and stairs  2  Decrease limitations with transfers and ADL  3  Decrease limitations with squatting and lifting  4  DC with HEP    Plan  Plan details: PT notes review of POC and findings with patient who is in agreement with PT recommendations of course of skilled therapy  Patient would benefit from: PT eval and skilled physical therapy  Planned modality interventions: thermotherapy: hydrocollator packs  Planned therapy interventions: manual therapy, neuromuscular re-education, patient education, self care, strengthening, stretching, therapeutic exercise, home exercise program, graded exercise, gait training and balance  Frequency: 2x week  Duration in weeks: 8  Treatment plan discussed with: patient        Subjective Evaluation    History of Present Illness  Mechanism of injury: Patient reports a decline in walking about 6 months ago from insidious onset with balance deficits and decrease tolerance  Patient reports feeling her activity level has declined with COVID as well  Patient denies any dizziness or spinning but just of balance  Patient reports difficulty with standing, walking, transfers, stairs, ADL, balance, lifting and carrying  Patient went to PCP for f/u and spoke with MD about walking and was referred to OPPT to address gait/balance deficits  Patient reports she is retired with significant PMH of GERD, fibromyalgia, IBS, bilateral TKA, and chronic pain syndrome with medical marijuana use     Pain  Current pain ratin  At best pain ratin  At worst pain ratin  Location: Lumbar spine and bilateral hip   Quality: dull ache and discomfort  Relieving factors: rest  Aggravating factors: stair climbing, walking, standing and lifting    Treatments  Current treatment: physical therapy  Patient Goals  Patient goals for therapy: decreased pain, improved balance, increased motion, increased strength and independence with ADLs/IADLs          Objective     Tenderness   Left Knee   Tenderness in the lateral joint line and medial joint line  Right Knee   Tenderness in the lateral joint line and medial joint line  Neurological Testing     Reflexes   Left   Patellar (L4): normal (2+)  Achilles (S1): normal (2+)    Right   Patellar (L4): normal (2+)  Achilles (S1): normal (2+)    Active Range of Motion   Left Hip   Normal active range of motion    Right Hip   Normal active range of motion  Left Knee   Normal active range of motion    Right Knee   Normal active range of motion  Left Ankle/Foot   Normal active range of motion    Right Ankle/Foot   Normal active range of motion    Strength/Myotome Testing     Left Hip   Planes of Motion   Flexion: 4+  Extension: 5  Abduction: 4+  Adduction: 5  External rotation: 5  Internal rotation: 5    Right Hip   Planes of Motion   Flexion: 3+  Extension: 4  Abduction: 4-  Adduction: 4+  External rotation: 3+  Internal rotation: 3+    Left Knee   Flexion: 4+  Extension: 4+  Quadriceps contraction: good    Right Knee   Flexion: 4+  Extension: 4-  Quadriceps contraction: fair    Left Ankle/Foot   Normal strength    Right Ankle/Foot   Normal strength    Ambulation     Observational Gait   Gait: asymmetric   Decreased walking speed, stride length and right stance time  Additional Observational Gait Details  PT notes decrease hip and knee flex, decrease stance on the right, decrease step length and decrease shandra with rating of fair with static and dynamic activities               Precautions:  PMH Fibromyalgia, GERD, IBS, bilateral TKA, chronic pain       Manuals 1/5       Bilateral Quad, piriformis and gastroc with manual hip traction  5 min                                Neuro Re-Ed        Front lunge         Monster walk         Side step and squat         Backwards walk         Tandem and side step walk on foam         BOSU March         Front and retro step over Bridge and with ABD         Supine Tball ABD crunch                 Ther Ex        Nu-step 10 min L3        Stair HR/TR         Front and lateral step up                                         HEP update/review  15 min        Ther Activity                        Gait Training                        Modalities        MHP to the bilateral hip and/or knees PRN

## 2022-01-10 ENCOUNTER — APPOINTMENT (OUTPATIENT)
Dept: PHYSICAL THERAPY | Facility: CLINIC | Age: 67
End: 2022-01-10
Payer: COMMERCIAL

## 2022-01-12 ENCOUNTER — APPOINTMENT (OUTPATIENT)
Dept: PHYSICAL THERAPY | Facility: CLINIC | Age: 67
End: 2022-01-12
Payer: COMMERCIAL

## 2022-01-12 NOTE — PROGRESS NOTES
Daily Note     Today's date: 2022  Patient name: Ember Umana  : 1955  MRN: [id number]  Referring provider: Cruz Todd DO  Dx:   Encounter Diagnosis     ICD-10-CM    1  Balance problem  R26 89                   Subjective: ***      Objective: See treatment diary below      Assessment: Tolerated treatment {Tolerated treatment :[id number]}  PT notes start of POC with focus on gait/balance and manual therapy to decrease pain levels and improve functional limitations to meet therapy goals  PT notes continuation of decrease ROM and strength t/o the bilateral hip and LE with need for continuation of skilled therapy  Plan: Continue per plan of care        Precautions:  PMH Fibromyalgia, GERD, IBS, bilateral TKA, chronic pain       Manuals       Bilateral Quad, piriformis and gastroc with manual hip traction  5 min                                Neuro Re-Ed        Front lunge         Monster walk         Side step and squat         Backwards walk         Tandem and side step walk on foam         BOSU March         Front and retro step over        [place] and with ABD         Supine Tball ABD crunch                 Ther Ex        Nu-step 10 min L3        Stair HR/TR         Front and lateral step up                                         HEP update/review  15 min        Ther Activity                        Gait Training                        Modalities        MHP to the bilateral hip and/or knees PRN

## 2022-01-17 ENCOUNTER — APPOINTMENT (OUTPATIENT)
Dept: PHYSICAL THERAPY | Facility: CLINIC | Age: 67
End: 2022-01-17
Payer: COMMERCIAL

## 2022-01-19 ENCOUNTER — OFFICE VISIT (OUTPATIENT)
Dept: PHYSICAL THERAPY | Facility: CLINIC | Age: 67
End: 2022-01-19
Payer: COMMERCIAL

## 2022-01-19 DIAGNOSIS — R26.89 BALANCE PROBLEM: Primary | ICD-10-CM

## 2022-01-19 DIAGNOSIS — R26.9 GAIT ABNORMALITY: ICD-10-CM

## 2022-01-19 PROCEDURE — 97110 THERAPEUTIC EXERCISES: CPT | Performed by: PHYSICAL THERAPIST

## 2022-01-19 PROCEDURE — 97112 NEUROMUSCULAR REEDUCATION: CPT | Performed by: PHYSICAL THERAPIST

## 2022-01-19 NOTE — PROGRESS NOTES
Daily Note     Today's date: 2022  Patient name: Zoe Cuenca  : 1955  MRN: [id number]  Referring provider: Lesley May DO  Dx:   Encounter Diagnosis     ICD-10-CM    1  Balance problem  R26 89    2  Gait abnormality  R26 9                   Subjective:  Patient reports she continues to recover from illness over the past 2 weeks  Patient reports she feels constant fatigue t/o the entire body with the recovery from illness but denies any pain in the LE  Objective: See treatment diary below      Assessment: Tolerated treatment fair  PT notes start of POC with focus on gait/balance and manual therapy to decrease pain levels and improve functional limitations to meet therapy goals  PT notes continuation of weakness in the bilateral hip and LE with need for continuation of skilled therapy  Plan: Continue per plan of care        Precautions:  PMH Fibromyalgia, GERD, IBS, bilateral TKA, chronic pain       Manuals       Bilateral Quad, piriformis and gastroc with manual hip traction  5 min  5 min                               Neuro Re-Ed        Front lunge   10x Bilat       Monster walk         Side step and squat         Backwards walk         Tandem and side step walk on foam   4x10 Feet Each       BOSU March         Front and retro step over        [place] and with ABD   10x Green       Supine Tball ABD crunch   10x3" Hold               Ther Ex        Nu-step 10 min L3  10 min L3       Stair HR/TR   2x10 Bilat  4" step       Front and lateral step up   10x Bilat  6" step                                       HEP update/review  15 min        Ther Activity                        Gait Training                        Modalities        MHP to the bilateral hip and/or knees PRN  Declined

## 2022-01-24 ENCOUNTER — OFFICE VISIT (OUTPATIENT)
Dept: PHYSICAL THERAPY | Facility: CLINIC | Age: 67
End: 2022-01-24
Payer: COMMERCIAL

## 2022-01-24 DIAGNOSIS — R26.89 BALANCE PROBLEM: Primary | ICD-10-CM

## 2022-01-24 DIAGNOSIS — R26.9 GAIT ABNORMALITY: ICD-10-CM

## 2022-01-24 PROCEDURE — 97110 THERAPEUTIC EXERCISES: CPT | Performed by: PHYSICAL THERAPIST

## 2022-01-24 PROCEDURE — 97112 NEUROMUSCULAR REEDUCATION: CPT | Performed by: PHYSICAL THERAPIST

## 2022-01-24 PROCEDURE — 97140 MANUAL THERAPY 1/> REGIONS: CPT | Performed by: PHYSICAL THERAPIST

## 2022-01-24 NOTE — PROGRESS NOTES
Daily Note     Today's date: 2022  Patient name: Tri Flower  : 1955  MRN: [id number]  Referring provider: Courtney William DO  Dx:   Encounter Diagnosis     ICD-10-CM    1  Balance problem  R26 89    2  Gait abnormality  R26 9                   Subjective:  Patient reports her walking is feeling better since the start of therapy but overall balance and stairs are still difficult  Objective: See treatment diary below      Assessment: Tolerated treatment well  PT notes continuation of progression of POC with focus on strengthening and gait/balance to decrease pain levels and improve functional limitations to meet therapy goals  PT notes continuation of bilateral hip and LE weakness with need for continuation of skilled therapy  Plan: Continue per plan of care        Precautions:  PMH Fibromyalgia, GERD, IBS, bilateral TKA, chronic pain       Manuals      Bilateral Quad, piriformis and gastroc with manual hip traction  5 min  5 min  10 min                              Neuro Re-Ed        Front lunge   10x Bilat  10x Bilat      Monster walk         Side step and squat         Backwards walk         Tandem and side step walk on foam   4x10 Feet Each  4x10 Feet   Each      BOSU March         Front and retro step over        [place] and with ABD   10x Green  15x Green      Supine Tball ABD crunch   10x3" Hold  15x3" Hold      Front and retro step over    10x Bilat  6" step                              Ther Ex        Nu-step 10 min L3  10 min L3  10 min L3      Stair HR/TR   2x10 Bilat  4" step  2x10 Bilat  4" step      Front and lateral step up   10x Bilat  6" step  15x Bilat  6" step                                      HEP update/review  15 min        Ther Activity                        Gait Training                        Modalities        MHP to the bilateral hip and/or knees PRN  Declined  Declined

## 2022-01-26 ENCOUNTER — OFFICE VISIT (OUTPATIENT)
Dept: PHYSICAL THERAPY | Facility: CLINIC | Age: 67
End: 2022-01-26
Payer: COMMERCIAL

## 2022-01-26 DIAGNOSIS — R26.9 GAIT ABNORMALITY: Primary | ICD-10-CM

## 2022-01-26 DIAGNOSIS — R26.89 BALANCE PROBLEM: ICD-10-CM

## 2022-01-26 PROCEDURE — 97110 THERAPEUTIC EXERCISES: CPT | Performed by: PHYSICAL THERAPIST

## 2022-01-26 PROCEDURE — 97112 NEUROMUSCULAR REEDUCATION: CPT | Performed by: PHYSICAL THERAPIST

## 2022-01-26 PROCEDURE — 97140 MANUAL THERAPY 1/> REGIONS: CPT | Performed by: PHYSICAL THERAPIST

## 2022-01-26 NOTE — PROGRESS NOTES
Daily Note     Today's date: 2022  Patient name: Jacki Liang  : 1955  MRN: [id number]  Referring provider: Juan Jose Spivey DO  Dx:   Encounter Diagnosis     ICD-10-CM    1  Gait abnormality  R26 9    2  Balance problem  R26 89                   Subjective:  Patient feels her walking and stairs have improved but continuation of difficulty with uneven surfaces and carrying items while walking and A/D stairs  Objective: See treatment diary below      Assessment: Tolerated treatment well  PT notes continuation of progression of POC with focus on gait/balance and manual therapy to decrease pain levels and improve functional limitations to meet therapy goals  PT notes continuation of decrease ROM and strength t/o the bilateral knee and LE with need for continuation of skilled therapy  Plan: Continue per plan of care        Precautions:  PMH Fibromyalgia, GERD, IBS, bilateral TKA, chronic pain       Manuals     Bilateral Quad, piriformis and gastroc with manual hip traction  5 min  5 min  10 min  10 min                             Neuro Re-Ed        Front lunge   10x Bilat  10x Bilat  10x Bilat     Monster walk     4x10 Feet Red     Side step and squat         Backwards walk         Tandem and side step walk on foam   4x10 Feet Each  4x10 Feet   Each  4x10 Feet Each     BOSU March         Front and retro step over   10x Bilat  6" step  10x Bilat  6" step     Bridge and with ABD   10x Green  15x Green  15x Green     Supine Tball ABD crunch   10x3" Hold  15x3" Hold  NT                                    Ther Ex        Nu-step 10 min L3  10 min L3  10 min L3  10 min L3     Stair HR/TR   2x10 Bilat  4" step  2x10 Bilat  4" step  2x15 Bilat  6" step     Front and lateral step up   10x Bilat  6" step  15x Bilat  6" step  2x10 Bilat  6" step                                     HEP update/review  15 min        Ther Activity                        Gait Training Modalities        MHP to the bilateral hip and/or knees PRN  Declined  Declined  Declined

## 2022-01-27 NOTE — PROGRESS NOTES
PT Re-Evaluation     Today's date: 2022  Patient name: Zoe Cuenca  : 1955  MRN: [id number]  Referring provider: Lesley May DO  Dx:   Encounter Diagnosis     ICD-10-CM    1  Gait abnormality  R26 9    2  Balance problem  R26 89        Start Time: 1300  Stop Time: 1400  Total time in clinic (min): 60 minutes    Assessment  Assessment details: PT notes the patient with WNL ROM but continuation of decrease strength t/o the bilateral hip and LE with demonstration of impaired gait pattern and balance leading to increase pain levels and functional limitations with need for continuation of skilled therapy for 6 weeks with focus on gait/balance, strengthening, manual therapy, analgesic modalities, and update/review of HEP  Impairments: abnormal gait, abnormal or restricted ROM, activity intolerance, impaired balance, impaired physical strength, lacks appropriate home exercise program and pain with function  Understanding of Dx/Px/POC: good   Prognosis: fair    Goals  ST  Initiate HEP-MET  2  Decrease pain levels by 25-50%-Partial MET   3  Increase strength by 1-2 mm grades-Partial MET  4  Increase ROM by 25-50%-Partial MET   LT  Decrease limitations with standing, walking and stairs-Partial MET  2  Decrease limitations with transfers and ADL-Partial MET  3  Decrease limitations with squatting and lifting-Partial MET  4   DC with HEP-Progressing     Plan  Plan details: PT notes review of POC and findings with patient who is in agreement with PT recommendations of continuation of skilled therapy     Patient would benefit from: skilled physical therapy  Planned modality interventions: thermotherapy: hydrocollator packs  Planned therapy interventions: manual therapy, neuromuscular re-education, patient education, self care, strengthening, stretching, therapeutic exercise, home exercise program, graded exercise, gait training and balance  Frequency: 2x week  Duration in weeks: 6  Treatment plan discussed with: patient        Subjective Evaluation    History of Present Illness  Mechanism of injury: Patient reports a decline in walking about 6 months ago from insidious onset with balance deficits and decrease tolerance  Patient reports feeling her activity level has declined with COVID as well  Patient denies any dizziness or spinning but just of balance  Patient reports difficulty with standing, walking, transfers, stairs, ADL, balance, lifting and carrying  Patient went to PCP for f/u and spoke with MD about walking and was referred to OPPT to address gait/balance deficits  Patient reports she is retired with significant PMH of GERD, fibromyalgia, IBS, bilateral TKA, and chronic pain syndrome with medical marijuana use  Presently the patient has attended 4 sessions of skilled therapy and feels improvement but is unable to quantify improvement  Patient reports her walking and stairs are improved but continuation of difficulty with balance, lifting and carrying activities with need for continuation of skilled therapy  Pain  Current pain ratin  At best pain ratin  At worst pain ratin  Location: Lumbar spine and bilateral hip   Quality: dull ache and discomfort  Relieving factors: rest  Aggravating factors: stair climbing, walking, standing and lifting  Progression: improved    Treatments  Current treatment: physical therapy  Patient Goals  Patient goals for therapy: decreased pain, improved balance, increased motion, increased strength and independence with ADLs/IADLs          Objective     Tenderness   Left Knee   Tenderness in the lateral joint line and medial joint line  Right Knee   Tenderness in the lateral joint line and medial joint line       Neurological Testing     Reflexes   Left   Patellar (L4): normal (2+)  Achilles (S1): normal (2+)    Right   Patellar (L4): normal (2+)  Achilles (S1): normal (2+)    Active Range of Motion   Left Hip   Normal active range of motion    Right Hip   Normal active range of motion  Left Knee   Normal active range of motion    Right Knee   Normal active range of motion  Left Ankle/Foot   Normal active range of motion    Right Ankle/Foot   Normal active range of motion    Strength/Myotome Testing     Left Hip   Planes of Motion   Flexion: 4+  Extension: 5  Abduction: 4+  Adduction: 5  External rotation: 5  Internal rotation: 5    Right Hip   Planes of Motion   Flexion: 4-  Extension: 4+  Abduction: 4  Adduction: 4+  External rotation: 4-  Internal rotation: 4    Left Knee   Flexion: 4+  Extension: 4+  Quadriceps contraction: good    Right Knee   Flexion: 4+  Extension: 4  Quadriceps contraction: fair    Left Ankle/Foot   Normal strength    Right Ankle/Foot   Normal strength    Ambulation     Observational Gait   Gait: asymmetric   Decreased walking speed, stride length and right stance time  Additional Observational Gait Details  PT notes decrease hip and knee flex, decrease stance on the right, decrease step length and decrease shandra with rating of fair+ with static and dynamic activities               Precautions:  PMH Fibromyalgia, GERD, IBS, bilateral TKA, chronic pain

## 2022-01-31 ENCOUNTER — OFFICE VISIT (OUTPATIENT)
Dept: PHYSICAL THERAPY | Facility: CLINIC | Age: 67
End: 2022-01-31
Payer: COMMERCIAL

## 2022-01-31 DIAGNOSIS — R26.89 BALANCE PROBLEM: ICD-10-CM

## 2022-01-31 DIAGNOSIS — R26.9 GAIT ABNORMALITY: Primary | ICD-10-CM

## 2022-01-31 PROCEDURE — 97112 NEUROMUSCULAR REEDUCATION: CPT | Performed by: PHYSICAL THERAPIST

## 2022-01-31 PROCEDURE — 97140 MANUAL THERAPY 1/> REGIONS: CPT | Performed by: PHYSICAL THERAPIST

## 2022-01-31 PROCEDURE — 97110 THERAPEUTIC EXERCISES: CPT | Performed by: PHYSICAL THERAPIST

## 2022-01-31 NOTE — PROGRESS NOTES
Daily Note     Today's date: 2022  Patient name: Mk Ashley  : 1955  MRN: [id number]  Referring provider: Taylor Fothergill, DO  Dx:   Encounter Diagnosis     ICD-10-CM    1  Gait abnormality  R26 9    2  Balance problem  R26 89                   Subjective:  Patient reports having a rough weekend with  of grandchildren leading to increase fatigue and soreness of the bilateral LE and knees  Patient reports she is not going to be able to perform full POC  Objective: See treatment diary below      Assessment: Tolerated treatment fair  PT notes modified treatment secondary to patient subjective comments but PT will continue to progress toward therapy goals  PT notes continuation of decrease strength t/o the bilateral knee and LE with demonstration of impaired gait pattern and balance with need for continuation of skilled therapy  Plan: Continue per plan of care        Precautions:  PMH Fibromyalgia, GERD, IBS, bilateral TKA, chronic pain     Manuals    Bilateral Quad, piriformis and gastroc with manual hip traction  5 min  5 min  10 min  10 min  10 min with knee PA mobs                            Neuro Re-Ed        Front lunge   10x Bilat  10x Bilat  10x Bilat  NT   Monster walk     4x10 Feet Red  NT   Side step and squat         Backwards walk         Tandem and side step walk on foam   4x10 Feet Each  4x10 Feet   Each  4x10 Feet Each  NT   BOSU March         Front and retro step over   10x Bilat  6" step  10x Bilat  6" step  NT   Bridge and with ABD   10x Green  15x Green  15x Green  Bridge Only   2x10    Supine Tball ABD crunch   10x3" Hold  15x3" Hold  NT 10x3" Hold                                    Ther Ex        Nu-step 10 min L3  10 min L3  10 min L3  10 min L3  10 min L1    Stair HR/TR   2x10 Bilat  4" step  2x10 Bilat  4" step  2x15 Bilat  6" step  NT   Front and lateral step up   10x Bilat  6" step  15x Bilat  6" step  2x10 Bilat  6" step  NT Supine LTR      10x5" Hold Bilat                            HEP update/review  15 min        Ther Activity                        Gait Training                        Modalities        MHP to the bilateral hip and/or knees PRN  Declined  Declined  Declined  15 min Pre tx

## 2022-02-02 ENCOUNTER — APPOINTMENT (OUTPATIENT)
Dept: PHYSICAL THERAPY | Facility: CLINIC | Age: 67
End: 2022-02-02
Payer: COMMERCIAL

## 2022-02-02 NOTE — PROGRESS NOTES
Daily Note     Today's date: 2022  Patient name: Bolivar Ortiz  : 1955  MRN: [id number]  Referring provider: Juli Bennett DO  Dx:   Encounter Diagnosis     ICD-10-CM    1  Gait abnormality  R26 9    2  Balance problem  R26 89                   Subjective: ***      Objective: See treatment diary below      Assessment: Tolerated treatment well  Patient exhibited good technique with therapeutic exercises and would benefit from continued PT to increase ROM/strength and endurance to improve mobility and gait  Plan: Continue per plan of care        Precautions:  PMH Fibromyalgia, GERD, IBS, bilateral TKA, chronic pain     Manuals  2/2   Bilateral Quad, piriformis and gastroc with manual hip traction  5 min  10 min  10 min  10 min with knee PA mobs                             Neuro Re-Ed     2/2   Front lunge  10x Bilat  10x Bilat  10x Bilat  NT    Monster walk    4x10 Feet Red  NT    Side step and squat         Backwards walk         Tandem and side step walk on foam  4x10 Feet Each  4x10 Feet   Each  4x10 Feet Each  NT    BOSU March         Front and retro step over  10x Bilat  6" step  10x Bilat  6" step  NT    Bridge and with ABD  10x Green  15x Green  15x Green  Bridge Only   2x10     Supine Tball ABD crunch  10x3" Hold  15x3" Hold  NT 10x3" Hold                                     Ther Ex     2/2   Nu-step 10 min L3  10 min L3  10 min L3  10 min L1     Stair HR/TR  2x10 Bilat  4" step  2x10 Bilat  4" step  2x15 Bilat  6" step  NT    Front and lateral step up  10x Bilat  6" step  15x Bilat  6" step  2x10 Bilat  6" step  NT    Supine LTR     10x5" Hold Bilat                             HEP update/review         Ther Activity     2/2                   Gait Training     2/2                   Modalities     2/2   MHP to the bilateral hip and/or knees Declined  Declined  Declined  15 min Pre tx

## 2022-02-07 ENCOUNTER — OFFICE VISIT (OUTPATIENT)
Dept: PHYSICAL THERAPY | Facility: CLINIC | Age: 67
End: 2022-02-07
Payer: COMMERCIAL

## 2022-02-07 DIAGNOSIS — R26.9 GAIT ABNORMALITY: Primary | ICD-10-CM

## 2022-02-07 DIAGNOSIS — R26.89 BALANCE PROBLEM: ICD-10-CM

## 2022-02-07 PROCEDURE — 97110 THERAPEUTIC EXERCISES: CPT | Performed by: PHYSICAL THERAPIST

## 2022-02-07 PROCEDURE — 97112 NEUROMUSCULAR REEDUCATION: CPT | Performed by: PHYSICAL THERAPIST

## 2022-02-07 PROCEDURE — 97140 MANUAL THERAPY 1/> REGIONS: CPT | Performed by: PHYSICAL THERAPIST

## 2022-02-07 NOTE — PROGRESS NOTES
Daily Note     Today's date: 2022  Patient name: Lizz Mendenhall  : 1955  MRN: [id number]  Referring provider: Elva Rice DO  Dx:   Encounter Diagnosis     ICD-10-CM    1  Gait abnormality  R26 9    2  Balance problem  R26 89                   Subjective:  Patient reports she was suffering from kidney stones last weeks with inability to attend OPPT  Patient reports the symptoms have decreased but continuation of weakness in the bilateral knee and LE  Objective: See treatment diary below      Assessment: Tolerated treatment well  PT notes continuation of progression of POC with focus on gait/balance and strengthening to decrease symptoms and improve functional limitations to meet therapy goals  PT notes continuation of decrease strength t/o the bilateral hip and LE with demonstration of impaired gait pattern and balance with need for continuation of skilled therapy  Plan: Continue per plan of care        Precautions:  PMH Fibromyalgia, GERD, IBS, bilateral TKA, chronic pain     Manuals  2/7   Bilateral Quad, piriformis and gastroc with manual hip traction  5 min  10 min  10 min  10 min with knee PA mobs  10 min bilateral knee PA mobs                            Neuro Re-Ed        Front lunge  10x Bilat  10x Bilat  10x Bilat  NT NT   Monster walk    4x10 Feet Red  NT NT   Side step and squat         Backwards walk         Tandem and side step walk on foam  4x10 Feet Each  4x10 Feet   Each  4x10 Feet Each  NT NT   BOSU March         Front and retro step over  10x Bilat  6" step  10x Bilat  6" step  NT NT    Bridge and with ABD  10x Green  15x Green  15x Green  Bridge Only   2x10  2x10   Bridge Only    Supine Tball ABD crunch  10x3" Hold  15x3" Hold  NT 10x3" Hold  15x3" Hold                                    Ther Ex        Nu-step 10 min L3  10 min L3  10 min L3  10 min L1  10 min L3    Stair HR/TR  2x10 Bilat  4" step  2x10 Bilat  4" step  2x15 Bilat  6" step  NT 2x10 Bilat  6" step    Front and lateral step up  10x Bilat  6" step  15x Bilat  6" step  2x10 Bilat  6" step  NT 10x Bilat  6" step    Supine LTR     10x5" Hold Bilat  10x5" Hold Bilat                            HEP update/review         Ther Activity                        Gait Training                        Modalities        MHP to the bilateral hip and/or knees Declined  Declined  Declined  15 min Pre tx  15 min MHP post session

## 2022-02-09 ENCOUNTER — OFFICE VISIT (OUTPATIENT)
Dept: PHYSICAL THERAPY | Facility: CLINIC | Age: 67
End: 2022-02-09
Payer: COMMERCIAL

## 2022-02-09 DIAGNOSIS — R26.9 GAIT ABNORMALITY: Primary | ICD-10-CM

## 2022-02-09 DIAGNOSIS — R26.89 BALANCE PROBLEM: ICD-10-CM

## 2022-02-09 PROCEDURE — 97140 MANUAL THERAPY 1/> REGIONS: CPT | Performed by: PHYSICAL THERAPIST

## 2022-02-09 PROCEDURE — 97110 THERAPEUTIC EXERCISES: CPT | Performed by: PHYSICAL THERAPIST

## 2022-02-09 PROCEDURE — 97112 NEUROMUSCULAR REEDUCATION: CPT | Performed by: PHYSICAL THERAPIST

## 2022-02-09 NOTE — PROGRESS NOTES
Daily Note     Today's date: 2022  Patient name: Hiro Mosley  : 1955  MRN: [id number]  Referring provider: Jade Saldaña DO  Dx:   Encounter Diagnosis     ICD-10-CM    1  Gait abnormality  R26 9    2  Balance problem  R26 89                   Subjective:  Patient reports she feels her legs are improving with increase strength but continuation of limitations with balance and uneven surfaces  Objective: See treatment diary below      Assessment: Tolerated treatment well  PT notes continuation of progression of POC with focus on gait/balance and manual therapy to decrease symptoms and improve functional limitations to meet therapy goals  PT notes continuation of decrease strength t/o the bilateral hip and LE with need for continuation of skilled therapy  Plan: Continue per plan of care        Precautions:  PMH Fibromyalgia, GERD, IBS, bilateral TKA, chronic pain     Manuals    Bilateral Quad, piriformis and gastroc with manual hip traction  10 min  10 min  10 min with knee PA mobs  10 min bilateral knee PA mobs  10 min with bilateral knee mobs                            Neuro Re-Ed        Front lunge  10x Bilat  10x Bilat  NT NT NT   Monster walk   4x10 Feet Red  NT NT NT   Side step and squat         Backwards walk         Tandem and side step walk on foam  4x10 Feet   Each  4x10 Feet Each  NT NT NT   BOSU March         Front and retro step over 10x Bilat  6" step  10x Bilat  6" step  NT NT  NR   Bridge and with ABD  15x Green  15x Green  Bridge Only   2x10  2x10   Bridge Only  10x    Supine Tball ABD crunch  15x3" Hold  NT 10x3" Hold  15x3" Hold  2x10   3" Hold    [place] Walks   F/B, S/S      5X Each   10#                            Ther Ex        Nu-step 10 min L3  10 min L3  10 min L1  10 min L3  10 min L3    Stair HR/TR  2x10 Bilat  4" step  2x15 Bilat  6" step  NT 2x10 Bilat  6" step  2x10 Bilat  6" step   Front and lateral step up  15x Bilat  6" step  2x10 Bilat  6" step  NT 10x Bilat  6" step  10x Bilat  6" step    Supine LTR    10x5" Hold Bilat  10x5" Hold Bilat  10x5" Hold   Bilat                            HEP update/review         Ther Activity     2/9                   Gait Training     2/9                   Modalities     2/9   MHP to the bilateral hip and/or knees Declined  Declined  15 min Pre tx  15 min MHP post session  15 min

## 2022-02-16 ENCOUNTER — OFFICE VISIT (OUTPATIENT)
Dept: PHYSICAL THERAPY | Facility: CLINIC | Age: 67
End: 2022-02-16
Payer: COMMERCIAL

## 2022-02-16 DIAGNOSIS — R26.89 BALANCE PROBLEM: ICD-10-CM

## 2022-02-16 DIAGNOSIS — R26.9 GAIT ABNORMALITY: Primary | ICD-10-CM

## 2022-02-16 PROCEDURE — 97140 MANUAL THERAPY 1/> REGIONS: CPT | Performed by: PHYSICAL THERAPIST

## 2022-02-16 PROCEDURE — 97112 NEUROMUSCULAR REEDUCATION: CPT | Performed by: PHYSICAL THERAPIST

## 2022-02-16 PROCEDURE — 97110 THERAPEUTIC EXERCISES: CPT | Performed by: PHYSICAL THERAPIST

## 2022-02-16 NOTE — PROGRESS NOTES
Daily Note     Today's date: 2022  Patient name: Sundar Montes  : 1955  MRN: [id number]  Referring provider: Juanita Covarrubias DO  Dx:   Encounter Diagnosis     ICD-10-CM    1  Gait abnormality  R26 9    2  Balance problem  R26 89                   Subjective:  Patient reports she was exercising at hoe and feels she over worked the hips and knees with increase soreness to 3/10 level  Objective: See treatment diary below      Assessment: Tolerated treatment well  PT notes continuation of progression of POC with focus on strengthening and manual therapy to decrease symptoms and improve functional limitations to meet therapy goals  PT notes continuation of decrease strength t/o the bilateral knee and LE with demonstration of impaired gait pattern and balance with need for continuation of skilled therapy  Plan: Continue per plan of care        Precautions:  PMH Fibromyalgia, GERD, IBS, bilateral TKA, chronic pain     Manuals    Bilateral Quad, piriformis and gastroc with manual hip traction  10 min  10 min with knee PA mobs  10 min bilateral knee PA mobs  10 min with bilateral knee mobs  10 min with bilateral knee mobs                            Neuro Re-Ed        Front lunge  10x Bilat  NT NT NT    Monster walk  4x10 Feet Red  NT NT NT    Side step and squat         Backwards walk         Tandem and side step walk on foam  4x10 Feet Each  NT NT NT 4x10 Feet   Each    BOSU March         Front and retro step over 10x Bilat  6" step  NT NT  NR 10x Bilat  6" step    Bridge and with ABD  15x Green  Bridge Only   2x10  2x10   Bridge Only  10x  NT   Supine Tball ABD crunch  NT 10x3" Hold  15x3" Hold  2x10   3" Hold  2x10   3" Hold    [place] Walks   F/B, S/S     5X Each   10#  5x Each   10#                            Ther Ex        Nu-step 10 min L3  10 min L1  10 min L3  10 min L3  10 min L3    Stair HR/TR  2x15 Bilat  6" step  NT 2x10 Bilat  6" step  2x10 Bilat  6" step 2x10 Bilat  6" step    Front and lateral step up  2x10 Bilat  6" step  NT 10x Bilat  6" step  10x Bilat  6" step  10x Bilat   6" step    Supine LTR   10x5" Hold Bilat  10x5" Hold Bilat  10x5" Hold   Bilat  10x5" Hold Bilat                            HEP update/review         Ther Activity    2/9                    Gait Training    2/9                    Modalities    2/9    MHP to the bilateral hip and/or knees Declined  15 min Pre tx  15 min MHP post session  15 min  15 min

## 2022-02-18 ENCOUNTER — EVALUATION (OUTPATIENT)
Dept: PHYSICAL THERAPY | Facility: CLINIC | Age: 67
End: 2022-02-18
Payer: COMMERCIAL

## 2022-02-18 DIAGNOSIS — R26.89 BALANCE PROBLEM: ICD-10-CM

## 2022-02-18 DIAGNOSIS — R26.9 GAIT ABNORMALITY: Primary | ICD-10-CM

## 2022-02-18 PROCEDURE — 97112 NEUROMUSCULAR REEDUCATION: CPT | Performed by: PHYSICAL THERAPIST

## 2022-02-18 PROCEDURE — 97110 THERAPEUTIC EXERCISES: CPT | Performed by: PHYSICAL THERAPIST

## 2022-02-18 PROCEDURE — 97140 MANUAL THERAPY 1/> REGIONS: CPT | Performed by: PHYSICAL THERAPIST

## 2022-02-18 NOTE — PROGRESS NOTES
PT Re-Evaluation     Today's date: 2022  Patient name: Lizz Mendenhall  : 1955  MRN: [id number]  Referring provider: Elva Rice DO  Dx:   Encounter Diagnosis     ICD-10-CM    1  Gait abnormality  R26 9    2  Balance problem  R26 89                   Assessment  Assessment details: PT notes the patient with WNL ROM but continuation of decrease strength t/o the bilateral hip and LE with demonstration of impaired gait pattern and balance leading to increase pain levels and functional limitations with need for continuation of skilled therapy for 6 weeks with focus on gait/balance, strengthening, manual therapy, analgesic modalities, and update/review of HEP  Impairments: abnormal gait, abnormal or restricted ROM, activity intolerance, impaired balance, impaired physical strength, lacks appropriate home exercise program and pain with function  Understanding of Dx/Px/POC: good   Prognosis: fair    Goals  ST  Initiate HEP-MET  2  Decrease pain levels by 25-50%-Partial MET   3  Increase strength by 1-2 mm grades-Partial MET  4  Increase ROM by 25-50%-Partial MET   LT  Decrease limitations with standing, walking and stairs-Partial MET  2  Decrease limitations with transfers and ADL-Partial MET  3  Decrease limitations with squatting and lifting-Partial MET  4   DC with HEP-Progressing     Plan  Plan details: PT notes review of POC and findings with patient who is in agreement with PT recommendations of continuation of skilled therapy     Patient would benefit from: skilled physical therapy  Planned modality interventions: thermotherapy: hydrocollator packs  Planned therapy interventions: manual therapy, neuromuscular re-education, patient education, self care, strengthening, stretching, therapeutic exercise, home exercise program, graded exercise, gait training and balance  Frequency: 2x week  Duration in weeks: 6  Treatment plan discussed with: patient        Subjective Evaluation    History of Present Illness  Mechanism of injury: Patient reports a decline in walking about 6 months ago from insidious onset with balance deficits and decrease tolerance  Patient reports feeling her activity level has declined with COVID as well  Patient denies any dizziness or spinning but just of balance  Patient reports difficulty with standing, walking, transfers, stairs, ADL, balance, lifting and carrying  Patient went to PCP for f/u and spoke with MD about walking and was referred to OPPT to address gait/balance deficits  Patient reports she is retired with significant PMH of GERD, fibromyalgia, IBS, bilateral TKA, and chronic pain syndrome with medical marijuana use  Presently the patient has attended 9 sessions of skilled therapy and feels improvement but is unable to quantify improvement  Patient reports her walking and stairs are improved but continuation of difficulty with balance, lifting and carrying activities with need for continuation of skilled therapy  Patient reports frequent flare-ups of the hip and lumbar symptoms with need to modify daily activities to allow time to recoperate and decrease symptoms  Pain  Current pain ratin  At best pain ratin  At worst pain ratin  Location: Lumbar spine and bilateral hip   Quality: dull ache and discomfort  Relieving factors: rest  Aggravating factors: stair climbing, walking, standing and lifting  Progression: improved    Treatments  Current treatment: physical therapy  Patient Goals  Patient goals for therapy: decreased pain, improved balance, increased motion, increased strength and independence with ADLs/IADLs          Objective     Tenderness   Left Knee   Tenderness in the lateral joint line and medial joint line  Right Knee   Tenderness in the lateral joint line and medial joint line       Neurological Testing     Reflexes   Left   Patellar (L4): normal (2+)  Achilles (S1): normal (2+)    Right   Patellar (L4): normal (2+)  Achilles (S1): normal (2+)    Active Range of Motion   Left Hip   Normal active range of motion    Right Hip   Normal active range of motion  Left Knee   Normal active range of motion    Right Knee   Normal active range of motion  Left Ankle/Foot   Normal active range of motion    Right Ankle/Foot   Normal active range of motion    Strength/Myotome Testing     Left Hip   Planes of Motion   Flexion: 4+  Extension: 5  Abduction: 4+  Adduction: 5  External rotation: 5  Internal rotation: 5    Right Hip   Planes of Motion   Flexion: 4-  Extension: 4+  Abduction: 4  Adduction: 4+  External rotation: 4-  Internal rotation: 4    Left Knee   Flexion: 4+  Extension: 4+  Quadriceps contraction: good    Right Knee   Flexion: 4+  Extension: 4  Quadriceps contraction: fair    Left Ankle/Foot   Normal strength    Right Ankle/Foot   Normal strength    Ambulation     Observational Gait   Gait: asymmetric   Decreased walking speed, stride length and right stance time  Additional Observational Gait Details  PT notes decrease hip and knee flex, decrease stance on the right, decrease step length and decrease shandra with rating of fair+ with static and dynamic activities               Precautions:  PMH Fibromyalgia, GERD, IBS, bilateral TKA, chronic pain     Manuals 1/31 2/7 2/9 2/16 2/18   Bilateral Quad, piriformis and gastroc with manual hip traction  10 min with knee PA mobs  10 min bilateral knee PA mobs  10 min with bilateral knee mobs  10 min with bilateral knee mobs  15 min with bilateral hip and lumbar traction with lumbar PA mobs                           Neuro Re-Ed   2/9     Front lunge  NT NT NT     Monster walk  NT NT NT     Side step and squat         Backwards walk         Tandem and side step walk on foam  NT NT NT 4x10 Feet   Each  NT   BOSU March         Front and retro step over NT NT  NR 10x Bilat  6" step  NT   Bridge and with ABD  Bridge Only   2x10  2x10   Bridge Only  10x  NT NT   Supine Tball ABD crunch  10x3" Hold  15x3" Hold  2x10   3" Hold  2x10   3" Hold  10x3" Hold    [place] Walks   F/B, S/S    5X Each   10#  5x Each   10#  NT    Seated 3 way trunk flexion     10x5" Hold Each    Stand lumbar extension against the bar      10x5" Hold            Ther Ex   2/9     Nu-step 10 min L1  10 min L3  10 min L3  10 min L3  10 min L3    Stair HR/TR  NT 2x10 Bilat  6" step  2x10 Bilat  6" step 2x10 Bilat  6" step  NT   Front and lateral step up  NT 10x Bilat  6" step  10x Bilat  6" step  10x Bilat   6" step  NT   Supine LTR  10x5" Hold Bilat  10x5" Hold Bilat  10x5" Hold   Bilat  10x5" Hold Bilat  10x5" Hold   Bilat                            HEP update/review         Ther Activity   2/9                     Gait Training   2/9                     Modalities   2/9     MHP to the bilateral hip and/or knees 15 min Pre tx  15 min MHP post session  15 min  15 min  15 min with MHP to the LB

## 2022-02-23 ENCOUNTER — OFFICE VISIT (OUTPATIENT)
Dept: PHYSICAL THERAPY | Facility: CLINIC | Age: 67
End: 2022-02-23
Payer: COMMERCIAL

## 2022-02-23 DIAGNOSIS — R26.9 GAIT ABNORMALITY: Primary | ICD-10-CM

## 2022-02-23 DIAGNOSIS — R26.89 BALANCE PROBLEM: ICD-10-CM

## 2022-02-23 PROCEDURE — 97110 THERAPEUTIC EXERCISES: CPT | Performed by: PHYSICAL THERAPIST

## 2022-02-23 PROCEDURE — 97112 NEUROMUSCULAR REEDUCATION: CPT | Performed by: PHYSICAL THERAPIST

## 2022-02-23 PROCEDURE — 97140 MANUAL THERAPY 1/> REGIONS: CPT | Performed by: PHYSICAL THERAPIST

## 2022-02-23 NOTE — PROGRESS NOTES
Daily Note     Today's date: 2022  Patient name: Avery Meraz  : 1955  MRN: [id number]  Referring provider: Jose Antonio Allan DO  Dx:   Encounter Diagnosis     ICD-10-CM    1  Gait abnormality  R26 9    2  Balance problem  R26 89                   Subjective:  Patient reports her fibromyalgia is really acting up the past few days with need for 2 chiropractic appointments as well as feeling increase tightness and pain in the bilateral hips  Objective: See treatment diary below      Assessment: Tolerated treatment well  PT notes continuation of progression of POC with focus on strengthening and manual therapy to decrease symptoms and improve functional limitations to meet therapy goals  PT notes continuation of decrease strength t/o the bilateral hip and LE with demonstration of impaired gait pattern and balance with need for continuation of skilled therapy  Plan: Continue per plan of care        Precautions:  PMH Fibromyalgia, GERD, IBS, bilateral TKA, chronic pain     Manuals    Bilateral Quad, piriformis and gastroc with manual hip traction  10 min bilateral knee PA mobs  10 min with bilateral knee mobs  10 min with bilateral knee mobs  15 min with bilateral hip and lumbar traction with lumbar PA mobs 15 min   Bilat hip and LE with manual lumbar traction                            Neuro Re-Ed        Front lunge  NT NT   10x Bilat   Min Squat    Monster walk  NT NT      Side step and squat      4x10 Feet Red    Backwards walk         Tandem and side step walk on foam  NT NT 4x10 Feet   Each  NT NT   BOSU March         Front and retro step over NT  NR 10x Bilat  6" step  NT NT   Bridge and with ABD  2x10   Bridge Only  10x  NT NT 2x10 Each   Green    Supine Tball ABD crunch  15x3" Hold  2x10   3" Hold  2x10   3" Hold  10x3" Hold  2x10   3" Hold    [place] Walks   F/B, S/S   5X Each   10#  5x Each   10#  NT  NT   Seated 3 way trunk flexion    10x5" Hold Each 10x5" Hold   Each    Stand lumbar extension against the bar     10x5" Hold  10x5" Hold            Ther Ex  2/9      Nu-step 10 min L3  10 min L3  10 min L3  10 min L3  10 min L3    Stair HR/TR  2x10 Bilat  6" step  2x10 Bilat  6" step 2x10 Bilat  6" step  NT NT   Front and lateral step up  10x Bilat  6" step  10x Bilat  6" step  10x Bilat   6" step  NT NT   Supine LTR  10x5" Hold Bilat  10x5" Hold   Bilat  10x5" Hold Bilat  10x5" Hold   Bilat  10x5" Hold   Bilat                            HEP update/review         Ther Activity  2/9                      Gait Training  2/9                      Modalities  2/9      MHP to the bilateral hip and/or knees 15 min MHP post session  15 min  15 min  15 min with MHP to the LB  15 min MHP to the LB

## 2022-02-24 ENCOUNTER — OFFICE VISIT (OUTPATIENT)
Dept: PHYSICAL THERAPY | Facility: CLINIC | Age: 67
End: 2022-02-24
Payer: COMMERCIAL

## 2022-02-24 DIAGNOSIS — R26.89 BALANCE PROBLEM: ICD-10-CM

## 2022-02-24 DIAGNOSIS — R26.9 GAIT ABNORMALITY: Primary | ICD-10-CM

## 2022-02-24 PROCEDURE — 97110 THERAPEUTIC EXERCISES: CPT | Performed by: PHYSICAL THERAPIST

## 2022-02-24 PROCEDURE — 97140 MANUAL THERAPY 1/> REGIONS: CPT | Performed by: PHYSICAL THERAPIST

## 2022-02-24 PROCEDURE — 97112 NEUROMUSCULAR REEDUCATION: CPT | Performed by: PHYSICAL THERAPIST

## 2022-02-24 NOTE — PROGRESS NOTES
Daily Note     Today's date: 2022  Patient name: Tri Flower  : 1955  MRN: [id number]  Referring provider: Courtney William DO  Dx:   Encounter Diagnosis     ICD-10-CM    1  Gait abnormality  R26 9    2  Balance problem  R26 89                   Subjective:  Patient reports her fibro is really acting up with increase pain levels in the neck and UB traveling into the arms but overall states the hips are improved with decrease tightness  Objective: See treatment diary below      Assessment: Tolerated treatment well  PT notes continuation of progression of POC with focus on strengthening and manual therapy to decrease symptoms and improve functional limitations to meet therapy goals  PT notes continuation of decrease ROM and strength t/o the bilateral knee and LE with need for continuation of skilled therapy  Plan: Continue per plan of care        Precautions:  PMH Fibromyalgia, GERD, IBS, bilateral TKA, chronic pain     Manuals    Bilateral Quad, piriformis and gastroc with manual hip traction  10 min with bilateral knee mobs  10 min with bilateral knee mobs  15 min with bilateral hip and lumbar traction with lumbar PA mobs 15 min   Bilat hip and LE with manual lumbar traction  15 min with bilateral LE and lumbar traction                            Neuro Re-Ed        Front lunge  NT   10x Bilat   Min Squat  10x Bilat  Min Squat    Monster walk  NT       Side step and squat     4x10 Feet Red  NT   Backwards walk         Tandem and side step walk on foam  NT 4x10 Feet   Each  NT NT NT   BOSU March         Front and retro step over NR 10x Bilat  6" step  NT NT NT   Bridge and with ABD  10x  NT NT 2x10 Each   Green  2x10 Bridge only    Supine Tball ABD crunch  2x10   3" Hold  2x10   3" Hold  10x3" Hold  2x10   3" Hold  10x3" Hold    [place] Walks   F/B, S/S  5X Each   10#  5x Each   10#  NT  NT NT    Seated 3 way trunk flexion   10x5" Hold Each  10x5" Hold   Each 10x5" Hold   Each    Stand lumbar extension against the bar    10x5" Hold  10x5" Hold  105" Hold            Ther Ex 2/9       Nu-step 10 min L3  10 min L3  10 min L3  10 min L3  10 min L3    Stair HR/TR  2x10 Bilat  6" step 2x10 Bilat  6" step  NT NT NT   Front and lateral step up  10x Bilat  6" step  10x Bilat   6" step  NT NT NT   Supine LTR  10x5" Hold   Bilat  10x5" Hold Bilat  10x5" Hold   Bilat  10x5" Hold   Bilat  10x5" Hold Bilat                            HEP update/review         Ther Activity 2/9                       Gait Training 2/9                       Modalities 2/9       MHP to the bilateral hip and/or knees 15 min  15 min  15 min with MHP to the LB  15 min MHP to the LB  15 min MHP to the LB

## 2022-02-25 ENCOUNTER — APPOINTMENT (OUTPATIENT)
Dept: PHYSICAL THERAPY | Facility: CLINIC | Age: 67
End: 2022-02-25
Payer: COMMERCIAL

## 2022-03-02 ENCOUNTER — APPOINTMENT (OUTPATIENT)
Dept: PHYSICAL THERAPY | Facility: CLINIC | Age: 67
End: 2022-03-02
Payer: COMMERCIAL

## 2022-03-02 NOTE — PROGRESS NOTES
Daily Note     Today's date: 3/2/2022  Patient name: Naveen Silva  : 1955  MRN: [id number]  Referring provider: Clyde Cantu DO  Dx:   Encounter Diagnosis     ICD-10-CM    1  Gait abnormality  R26 9    2  Balance problem  R26 89                   Subjective: ***      Objective: See treatment diary below      Assessment: Tolerated treatment {Tolerated treatment :[id number]}  PT notes continuation of decrease ROM and strength t/o the bilateral nip and LE with demonstration of impaired gait pattern and balance with need for continuation of skilled therapy  Plan: Continue per plan of care        Precautions:  PMH Fibromyalgia, GERD, IBS, bilateral TKA, chronic pain     Manuals 2/16 2/18 2/23 2/24 3/2   Bilateral Quad, piriformis and gastroc with manual hip traction  10 min with bilateral knee mobs  15 min with bilateral hip and lumbar traction with lumbar PA mobs 15 min   Bilat hip and LE with manual lumbar traction  15 min with bilateral LE and lumbar traction                             Neuro Re-Ed        Front lunge    10x Bilat   Min Squat  10x Bilat  Min Squat     Monster walk         Side step and squat    4x10 Feet Red  NT    Backwards walk         Tandem and side step walk on foam  4x10 Feet   Each  NT NT NT    BOSU March         Front and retro step over 10x Bilat  6" step  NT NT NT    Bridge and with ABD  NT NT 2x10 Each   Green  2x10 Bridge only     Supine Tball ABD crunch  2x10   3" Hold  10x3" Hold  2x10   3" Hold  10x3" Hold     [place] Walks   F/B, S/S  5x Each   10#  NT  NT NT     Seated 3 way trunk flexion  10x5" Hold Each  10x5" Hold   Each  10x5" Hold   Each     Stand lumbar extension against the bar   10x5" Hold  10x5" Hold  105" Hold             Ther Ex        Nu-step 10 min L3  10 min L3  10 min L3  10 min L3     Stair HR/TR  2x10 Bilat  6" step  NT NT NT    Front and lateral step up  10x Bilat   6" step  NT NT NT    Supine LTR  10x5" Hold Bilat  10x5" Hold   Bilat  10x5" Hold   Bilat  10x5" Hold Bilat                             HEP update/review         Ther Activity                        Gait Training                        Modalities        MHP to the bilateral hip and/or knees 15 min  15 min with MHP to the LB  15 min MHP to the LB  15 min MHP to the LB

## 2022-03-04 ENCOUNTER — OFFICE VISIT (OUTPATIENT)
Dept: PHYSICAL THERAPY | Facility: CLINIC | Age: 67
End: 2022-03-04
Payer: COMMERCIAL

## 2022-03-04 DIAGNOSIS — R26.9 GAIT ABNORMALITY: Primary | ICD-10-CM

## 2022-03-04 DIAGNOSIS — R26.89 BALANCE PROBLEM: ICD-10-CM

## 2022-03-04 PROCEDURE — 97110 THERAPEUTIC EXERCISES: CPT | Performed by: PHYSICAL THERAPIST

## 2022-03-04 PROCEDURE — 97140 MANUAL THERAPY 1/> REGIONS: CPT | Performed by: PHYSICAL THERAPIST

## 2022-03-04 PROCEDURE — 97112 NEUROMUSCULAR REEDUCATION: CPT | Performed by: PHYSICAL THERAPIST

## 2022-03-04 NOTE — PROGRESS NOTES
Daily Note     Today's date: 3/4/2022  Patient name: Luisito Reed  : 1955  MRN: [id number]  Referring provider: Pamela Guzman DO  Dx:   Encounter Diagnosis     ICD-10-CM    1  Gait abnormality  R26 9    2  Balance problem  R26 89                   Subjective:  Patient reports she feels pretty good today overall but continuation of difficulty with walking on uneven surfaces  Objective: See treatment diary below      Assessment: Tolerated treatment well  PT notes the patient with continuation of decrease ROM and strength t/o the bilateral hip and LE with demonstration of impaired gait pattern and balance with need for continuation of skilled therapy  Plan: Continue per plan of care        Precautions:  PMH Fibromyalgia, GERD, IBS, bilateral TKA, chronic pain     Manuals 2/16 2/18 2/23 2/24 3/4   Bilateral Quad, piriformis and gastroc with manual hip traction  10 min with bilateral knee mobs  15 min with bilateral hip and lumbar traction with lumbar PA mobs 15 min   Bilat hip and LE with manual lumbar traction  15 min with bilateral LE and lumbar traction  15 mi with bilateral LE and lumbar traction                            Neuro Re-Ed        Front lunge    10x Bilat   Min Squat  10x Bilat  Min Squat  10 Bilat  Min Squat    Monster walk         Side step and squat    4x10 Feet Red  NT NT   Backwards walk         Tandem and side step walk on foam  4x10 Feet   Each  NT NT NT NT   BOSU March         Front and retro step over 10x Bilat  6" step  NT NT NT NT   Bridge and with ABD  NT NT 2x10 Each   Green  2x10 Bridge only  With Tball  10x5" Hold    Supine Tball ABD crunch  2x10   3" Hold  10x3" Hold  2x10   3" Hold  10x3" Hold  10x3" Hold    Yue Walks   F/B, S/S  5x Each   10#  NT  NT NT  NT   Seated 3 way trunk flexion  10x5" Hold Each  10x5" Hold   Each  10x5" Hold   Each  10x5" Hold   Each    Stand lumbar extension against the bar   10x5" Hold  10x5" Hold  105" Hold  10x5" Hold    Stand OAL      10x3" Hold  Bilat    Rocker board      Tandem F/B  10x Each                    Ther Ex        Nu-step 10 min L3  10 min L3  10 min L3  10 min L3  10 min L5    Stair HR/TR  2x10 Bilat  6" step  NT NT NT 2x10 Bilat  6" step    Front and lateral step up  10x Bilat   6" step  NT NT NT 10x Bilat  6" step    Supine LTR  10x5" Hold Bilat  10x5" Hold   Bilat  10x5" Hold   Bilat  10x5" Hold Bilat  10x5" Hold Bilat                            HEP update/review         Ther Activity                        Gait Training                        Modalities        MHP to the bilateral hip and/or knees 15 min  15 min with MHP to the LB  15 min MHP to the LB  15 min MHP to the LB  15 min   MHP to the LB

## 2022-03-09 ENCOUNTER — APPOINTMENT (OUTPATIENT)
Dept: PHYSICAL THERAPY | Facility: CLINIC | Age: 67
End: 2022-03-09
Payer: COMMERCIAL

## 2022-03-09 NOTE — PROGRESS NOTES
PT Re-Evaluation     Today's date: 3/9/2022  Patient name: Nish Waldron  : 1955  MRN: [id number]  Referring provider: Iraida Horowitz DO  Dx:   Encounter Diagnosis     ICD-10-CM    1  Gait abnormality  R26 9    2  Balance problem  R26 89                   Assessment  Assessment details: PT notes the patient with WNL ROM but continuation of decrease strength t/o the bilateral hip and LE with demonstration of impaired gait pattern and balance leading to increase pain levels and functional limitations with need for continuation of skilled therapy for 6 weeks with focus on gait/balance, strengthening, manual therapy, analgesic modalities, and update/review of HEP  Impairments: abnormal gait, abnormal or restricted ROM, activity intolerance, impaired balance, impaired physical strength, lacks appropriate home exercise program and pain with function  Understanding of Dx/Px/POC: good   Prognosis: fair    Goals  ST  Initiate HEP-MET  2  Decrease pain levels by 25-50%-Partial MET   3  Increase strength by 1-2 mm grades-Partial MET  4  Increase ROM by 25-50%-Partial MET   LT  Decrease limitations with standing, walking and stairs-Partial MET  2  Decrease limitations with transfers and ADL-Partial MET  3  Decrease limitations with squatting and lifting-Partial MET  4   DC with HEP-Progressing     Plan  Plan details: PT notes review of POC and findings with patient who is in agreement with PT recommendations of continuation of skilled therapy     Patient would benefit from: skilled physical therapy  Planned modality interventions: thermotherapy: hydrocollator packs  Planned therapy interventions: manual therapy, neuromuscular re-education, patient education, self care, strengthening, stretching, therapeutic exercise, home exercise program, graded exercise, gait training and balance  Frequency: 2x week  Duration in weeks: 6  Treatment plan discussed with: patient        Subjective Evaluation    History of Present Illness  Mechanism of injury: Patient reports a decline in walking about 6 months ago from insidious onset with balance deficits and decrease tolerance  Patient reports feeling her activity level has declined with COVID as well  Patient denies any dizziness or spinning but just of balance  Patient reports difficulty with standing, walking, transfers, stairs, ADL, balance, lifting and carrying  Patient went to PCP for f/u and spoke with MD about walking and was referred to OPPT to address gait/balance deficits  Patient reports she is retired with significant PMH of GERD, fibromyalgia, IBS, bilateral TKA, and chronic pain syndrome with medical marijuana use  Presently the patient has attended 13 sessions of skilled therapy and feels improvement but is unable to quantify improvement  Patient reports her walking and stairs are improved but continuation of difficulty with balance, lifting and carrying activities with need for continuation of skilled therapy  Patient reports frequent flare-ups of the hip and lumbar symptoms with need to modify daily activities to allow time to recoperate and decrease symptoms  Pain  Current pain ratin  At best pain ratin  At worst pain ratin  Location: Lumbar spine and bilateral hip   Quality: dull ache and discomfort  Relieving factors: rest  Aggravating factors: stair climbing, walking, standing and lifting  Progression: improved    Treatments  Current treatment: physical therapy  Patient Goals  Patient goals for therapy: decreased pain, improved balance, increased motion, increased strength and independence with ADLs/IADLs          Objective     Tenderness   Left Knee   Tenderness in the lateral joint line and medial joint line  Right Knee   Tenderness in the lateral joint line and medial joint line       Neurological Testing     Reflexes   Left   Patellar (L4): normal (2+)  Achilles (S1): normal (2+)    Right   Patellar (L4): normal (2+)  Achilles (S1): normal (2+)    Active Range of Motion   Left Hip   Normal active range of motion    Right Hip   Normal active range of motion  Left Knee   Normal active range of motion    Right Knee   Normal active range of motion  Left Ankle/Foot   Normal active range of motion    Right Ankle/Foot   Normal active range of motion    Strength/Myotome Testing     Left Hip   Planes of Motion   Flexion: 4+  Extension: 5  Abduction: 4+  Adduction: 5  External rotation: 5  Internal rotation: 5    Right Hip   Planes of Motion   Flexion: 4-  Extension: 4+  Abduction: 4  Adduction: 4+  External rotation: 4-  Internal rotation: 4    Left Knee   Flexion: 4+  Extension: 4+  Quadriceps contraction: good    Right Knee   Flexion: 4+  Extension: 4  Quadriceps contraction: fair    Left Ankle/Foot   Normal strength    Right Ankle/Foot   Normal strength    Ambulation     Observational Gait   Gait: asymmetric   Decreased walking speed, stride length and right stance time  Additional Observational Gait Details  PT notes decrease hip and knee flex, decrease stance on the right, decrease step length and decrease shandra with rating of fair+ with static and dynamic activities               Precautions:  PMH Fibromyalgia, GERD, IBS, bilateral TKA, chronic pain     Manuals 2/18 2/23 2/24 3/4 3/9   Bilateral Quad, piriformis and gastroc with manual hip traction  15 min with bilateral hip and lumbar traction with lumbar PA mobs 15 min   Bilat hip and LE with manual lumbar traction  15 min with bilateral LE and lumbar traction  15 mi with bilateral LE and lumbar traction                             Neuro Re-Ed        Front lunge   10x Bilat   Min Squat  10x Bilat  Min Squat  10 Bilat  Min Squat     Monster walk         Side step and squat   4x10 Feet Red  NT NT    Backwards walk         Tandem and side step walk on foam  NT NT NT NT    BOSU March         Front and retro step over NT NT NT NT    Bridge and with ABD  NT 2x10 Each   Green  2x10 Bridge only  With Tball  10x5" Hold     Supine Tball ABD crunch  10x3" Hold  2x10   3" Hold  10x3" Hold  10x3" Hold     Yue Walks   F/B, S/S  NT  NT NT  NT    Seated 3 way trunk flexion 10x5" Hold Each  10x5" Hold   Each  10x5" Hold   Each  10x5" Hold   Each     Stand lumbar extension against the bar  10x5" Hold  10x5" Hold  105" Hold  10x5" Hold     Stand OAL     10x3" Hold  Bilat     Rocker board     Tandem F/B  10x Each                     Ther Ex        Nu-step 10 min L3  10 min L3  10 min L3  10 min L5     Stair HR/TR  NT NT NT 2x10 Bilat  6" step     Front and lateral step up  NT NT NT 10x Bilat  6" step     Supine LTR  10x5" Hold   Bilat  10x5" Hold   Bilat  10x5" Hold Bilat  10x5" Hold Bilat                             HEP update/review         Ther Activity                        Gait Training                        Modalities        MHP to the bilateral hip and/or knees 15 min with MHP to the LB  15 min MHP to the LB  15 min MHP to the LB  15 min   MHP to the LB

## 2022-03-11 ENCOUNTER — OFFICE VISIT (OUTPATIENT)
Dept: PHYSICAL THERAPY | Facility: CLINIC | Age: 67
End: 2022-03-11
Payer: COMMERCIAL

## 2022-03-11 DIAGNOSIS — R26.9 GAIT ABNORMALITY: Primary | ICD-10-CM

## 2022-03-11 PROCEDURE — 97110 THERAPEUTIC EXERCISES: CPT

## 2022-03-11 PROCEDURE — 97140 MANUAL THERAPY 1/> REGIONS: CPT

## 2022-03-11 NOTE — PROGRESS NOTES
Daily Note     Today's date: 3/11/2022  Patient name: Mark Jaffe  : 1955  MRN: [id number]  Referring provider: Wendy Hair DO  Dx: No diagnosis found  Subjective: " LBP, centralized  Her pain is 4/10      Objective: See treatment diary below      Assessment: Tolerated treatment program within localized low back pain  Provided VC's through session  Will advance when able to perform  Patient would benefit from continued PT      Plan: Continue per plan of care        Precautions:  PMH Fibromyalgia, GERD, IBS, bilateral TKA, chronic pain     Manuals 2/16 2/18 2/23 2/24 3/4 3/11      Bilateral Quad, piriformis and gastroc with manual hip traction  10 min with bilateral knee mobs  15 min with bilateral hip and lumbar traction with lumbar PA mobs 15 min   Bilat hip and LE with manual lumbar traction  15 min with bilateral LE and lumbar traction  15 mi with bilateral LE and lumbar traction  15'                                          Neuro Re-Ed            Front lunge    10x Bilat   Min Squat  10x Bilat  Min Squat  10 Bilat  Min Squat  10      Monster walk             Side step and squat    4x10 Feet Red  NT NT       Backwards walk             Tandem and side step walk on foam  4x10 Feet   Each  NT NT NT NT       BOSU March             Front and retro step over 10x Bilat  6" step  NT NT NT NT       Bridge and with ABD  NT NT 2x10 Each   Green  2x10 Bridge only  With Tball  10x5" Hold  10/2      Supine Tball ABD crunch  2x10   3" Hold  10x3" Hold  2x10   3" Hold  10x3" Hold  10x3" Hold  10x3''      Yue Walks   F/B, S/S  5x Each   10#  NT  NT NT  NT       Seated 3 way trunk flexion  10x5" Hold Each  10x5" Hold   Each  10x5" Hold   Each  10x5" Hold   Each  10x5''      Stand lumbar extension against the bar   10x5" Hold  10x5" Hold  105" Hold  10x5" Hold  10x5''      Stand OAL      10x3" Hold  Bilat  10x3''      Rocker board      Tandem F/B  10x Each  NT                              Ther Ex            Nu-step 10 min L3  10 min L3  10 min L3  10 min L3  10 min L5  L5  10'      Stair HR/TR  2x10 Bilat  6" step  NT NT NT 2x10 Bilat  6" step  2/10  6''      Front and lateral step up  10x Bilat   6" step  NT NT NT 10x Bilat  6" step  10x B  6'' step      Supine LTR  10x5" Hold Bilat  10x5" Hold   Bilat  10x5" Hold   Bilat  10x5" Hold Bilat  10x5" Hold Bilat  nt                                          HEP update/review             Ther Activity                                    Gait Training                                    Modalities            MHP to the bilateral hip and/or knees 15 min  15 min with MHP to the LB  15 min MHP to the LB  15 min MHP to the LB  15 min   MHP to the LB  15 min  MHP  LB,seated

## 2022-03-16 ENCOUNTER — EVALUATION (OUTPATIENT)
Dept: PHYSICAL THERAPY | Facility: CLINIC | Age: 67
End: 2022-03-16
Payer: COMMERCIAL

## 2022-03-16 DIAGNOSIS — R26.9 GAIT ABNORMALITY: Primary | ICD-10-CM

## 2022-03-16 DIAGNOSIS — R26.89 BALANCE PROBLEM: ICD-10-CM

## 2022-03-16 PROCEDURE — 97140 MANUAL THERAPY 1/> REGIONS: CPT | Performed by: PHYSICAL THERAPIST

## 2022-03-16 PROCEDURE — 97112 NEUROMUSCULAR REEDUCATION: CPT | Performed by: PHYSICAL THERAPIST

## 2022-03-16 PROCEDURE — 97110 THERAPEUTIC EXERCISES: CPT | Performed by: PHYSICAL THERAPIST

## 2022-03-16 NOTE — PROGRESS NOTES
PT Re-Evaluation     Today's date: 3/16/2022  Patient name: Edvin Bradford  : 1955  MRN: [id number]  Referring provider: Rebekah Bravo DO  Dx:   Encounter Diagnosis     ICD-10-CM    1  Gait abnormality  R26 9    2  Balance problem  R26 89                   Assessment  Assessment details: PT notes the patient with WNL ROM but continuation of decrease strength t/o the bilateral hip and LE with demonstration of impaired gait pattern and balance leading to increase pain levels and functional limitations with need for continuation of skilled therapy for 4 weeks with focus on gait/balance, strengthening, manual therapy, analgesic modalities, and update/review of HEP  Impairments: abnormal gait, abnormal or restricted ROM, activity intolerance, impaired balance, impaired physical strength, lacks appropriate home exercise program and pain with function  Understanding of Dx/Px/POC: good   Prognosis: fair    Goals  ST  Initiate HEP-MET  2  Decrease pain levels by 25-50%-Partial MET   3  Increase strength by 1-2 mm grades-Partial MET  4  Increase ROM by 25-50%-Partial MET   LT  Decrease limitations with standing, walking and stairs-Partial MET  2  Decrease limitations with transfers and ADL-Partial MET  3  Decrease limitations with squatting and lifting-Partial MET  4   DC with HEP-Progressing     Plan  Plan details: PT notes review of POC and findings with patient who is in agreement with PT recommendations of continuation of skilled therapy     Patient would benefit from: skilled physical therapy  Planned modality interventions: thermotherapy: hydrocollator packs  Planned therapy interventions: manual therapy, neuromuscular re-education, patient education, self care, strengthening, stretching, therapeutic exercise, home exercise program, graded exercise, gait training and balance  Frequency: 2x week  Duration in weeks: 4  Treatment plan discussed with: patient        Subjective Evaluation    History of Present Illness  Mechanism of injury: Patient reports a decline in walking about 6 months ago from insidious onset with balance deficits and decrease tolerance  Patient reports feeling her activity level has declined with COVID as well  Patient denies any dizziness or spinning but just of balance  Patient reports difficulty with standing, walking, transfers, stairs, ADL, balance, lifting and carrying  Patient went to PCP for f/u and spoke with MD about walking and was referred to OPPT to address gait/balance deficits  Patient reports she is retired with significant PMH of GERD, fibromyalgia, IBS, bilateral TKA, and chronic pain syndrome with medical marijuana use  Presently the patient has attended 13 sessions of skilled therapy and feels improvement but is unable to quantify improvement  Patient reports her walking and stairs are improved but continuation of difficulty with balance, lifting and carrying activities with need for continuation of skilled therapy  Patient reports frequent flare-ups of the hip and lumbar symptoms with need to modify daily activities to allow time to recoperate and decrease symptoms  Pain  Current pain rating: 3  At best pain ratin  At worst pain ratin  Location: Lumbar spine and bilateral hip   Quality: dull ache and discomfort  Relieving factors: rest  Aggravating factors: stair climbing, walking, standing and lifting  Progression: improved    Treatments  Current treatment: physical therapy  Patient Goals  Patient goals for therapy: decreased pain, improved balance, increased motion, increased strength and independence with ADLs/IADLs          Objective     Tenderness   Left Knee   Tenderness in the lateral joint line and medial joint line  Right Knee   Tenderness in the lateral joint line and medial joint line       Neurological Testing     Reflexes   Left   Patellar (L4): normal (2+)  Achilles (S1): normal (2+)    Right   Patellar (L4): normal (2+)  Achilles (S1): normal (2+)    Active Range of Motion   Left Hip   Normal active range of motion    Right Hip   Normal active range of motion  Left Knee   Normal active range of motion    Right Knee   Normal active range of motion  Left Ankle/Foot   Normal active range of motion    Right Ankle/Foot   Normal active range of motion    Strength/Myotome Testing     Left Hip   Planes of Motion   Flexion: 4  Extension: 4+  Abduction: 4  Adduction: 5  External rotation: 4-  Internal rotation: 4-    Right Hip   Planes of Motion   Flexion: 4  Extension: 4+  Abduction: 4  Adduction: 5  External rotation: 4-  Internal rotation: 4-    Left Knee   Flexion: 4+  Extension: 4  Quadriceps contraction: good    Right Knee   Flexion: 4+  Extension: 4  Quadriceps contraction: fair    Left Ankle/Foot   Normal strength    Right Ankle/Foot   Normal strength    Ambulation     Observational Gait   Gait: asymmetric   Decreased walking speed, stride length and right stance time  Additional Observational Gait Details  PT notes decrease hip and knee flex, decrease stance on the right, decrease step length and decrease shandra with rating of fair+ with static and dynamic activities               Precautions:  PMH Fibromyalgia, GERD, IBS, bilateral TKA, chronic pain     Manuals 2/23 2/24 3/4 3/11 3/16   Bilateral Quad, piriformis and gastroc with manual hip traction  15 min   Bilat hip and LE with manual lumbar traction  15 min with bilateral LE and lumbar traction  15 min with bilateral LE and lumbar traction  15' 15 min with Bilat LE and lumbar traction                             Neuro Re-Ed        Front lunge  10x Bilat   Min Squat  10x Bilat  Min Squat  10 Bilat  Min Squat  10X Bilat  NT    Monster walk         Side step and squat  4x10 Feet Red  NT NT     Backwards walk         Tandem and side step walk on foam  NT NT NT     BOSU March         Front and retro step over NT NT NT     Bridge and with ABD  2x10 Each   [place] 2x10 Bridge only  With Tball  10x5" Hold  10/2 2x10 and with Tball   10x5" Hold    Supine Tball ABD crunch  2x10   3" Hold  10x3" Hold  10x3" Hold  10x3'' 10x5" Hold    Yue Walks   F/B, S/S  NT NT  NT     Seated 3 way trunk flexion 10x5" Hold   Each  10x5" Hold   Each  10x5" Hold   Each  10x5'' 10x5" Hold Each    Stand lumbar extension against the bar  10x5" Hold  105" Hold  10x5" Hold  10x5'' 10x5" Hold    Stand OAL    10x3" Hold  Bilat  10x3'' 10x3" Hold Bilat    Rocker board    Tandem F/B  10x Each  NT NT                   Ther Ex        Nu-step 10 min L3  10 min L3  10 min L5  L5  10' 10 min L3    Stair HR/TR  NT NT 2x10 Bilat  6" step  2/10  6'' NT   Front and lateral step up  NT NT 10x Bilat  6" step  10x B  6'' step NT   Supine LTR  10x5" Hold   Bilat  10x5" Hold Bilat  10x5" Hold Bilat  nt 10x5" Hold Bilat                           HEP update/review         Ther Activity                        Gait Training                        Modalities        MHP to the bilateral hip and/or knees 15 min MHP to the LB  15 min MHP to the LB  15 min   MHP to the LB  15 min  MHP  LB,seated 15 min   MHP and bilat Knees

## 2022-03-16 NOTE — PROGRESS NOTES
Daily Note     Today's date: 3/17/2022  Patient name: Salomón Carrillo  : 1955  MRN: [id number]  Referring provider: Kerrie Duke DO  Dx:   Encounter Diagnosis     ICD-10-CM    1  Gait abnormality  R26 9    2  Balance problem  R26 89                   Subjective:  Patient reports feeling a little better today with decrease tightness and soreness in the bilateral knees  Patient feels she needs new shoes to help with her walking and symptoms  Objective: See treatment diary below      Assessment: Tolerated treatment well  PT notes continuation of decrease ROM and strength t/o the bilateral knee and LE with demonstration of impaired gait pattern and balance with need for continuation of skilled therapy  Plan: Continue per plan of care        Precautions:  PMH Fibromyalgia, GERD, IBS, bilateral TKA, chronic pain     Manuals 2/24 3/4 3/11 3/16 3/17   Bilateral Quad, piriformis and gastroc with manual hip traction  15 min with bilateral LE and lumbar traction  15 min with bilateral LE and lumbar traction  15' 15 min with Bilat LE and lumbar traction   15 min with Bilat LE and lumbar traction                            Neuro Re-Ed        Front lunge  10x Bilat  Min Squat  10 Bilat  Min Squat  10X Bilat  NT  NT   Monster walk         Side step and squat  NT NT      Backwards walk         Tandem and side step walk on foam  NT NT      BOSU March         Front and retro step over NT [employer] and with ABD  2x10 Bridge only  With Tball  10x5" Hold  10/2 2x10 and with Tball   10x5" Hold  2x10 and with ABD  2x10   Green    Supine Tball ABD crunch  10x3" Hold  10x3" Hold  10x3'' 10x5" Hold  10x5" Hold    [place] Walks   F/B, S/S  NT  NT      Seated 3 way trunk flexion 10x5" Hold   Each  10x5" Hold   Each  10x5'' 10x5" Hold Each  10x5" Hold   Each    Stand lumbar extension against the bar  105" Hold  10x5" Hold  10x5'' 10x5" Hold  NT   Stand OAL   10x3" Hold  Bilat  10x3'' 10x3" Hold Bilat  NT Rocker board   Tandem F/B  10x Each  NT NT 10x F/B   Each                    Ther Ex        Nu-step 10 min L3  10 min L5  L5  10' 10 min L3  10 min L3    Stair HR/TR  NT 2x10 Bilat  6" step  2/10  6'' NT 2x10 Bilat  6" step    Front and lateral step up  NT 10x Bilat  6" step  10x B  6'' step NT 10x Bilat  6" step    Supine LTR  10x5" Hold Bilat  10x5" Hold Bilat  nt 10x5" Hold Bilat 10x5" Hold Bilat     Leg press     10x 65#   10x 75#                    HEP update/review         Ther Activity                        Gait Training                        Modalities        MHP to the bilateral hip and/or knees 15 min MHP to the LB  15 min   MHP to the LB  15 min  MHP  LB,seated 15 min   MHP and bilat Knees  15 min   MHP and Bilat   Knees

## 2022-03-16 NOTE — LETTER
2022    Dhiraj Parker DO  [address] [facility] [address]    Patient: Luisito Reed   YOB: 1955   Date of Visit: 3/16/2022     Encounter Diagnosis     ICD-10-CM    1  Gait abnormality  R26 9    2  Balance problem  R26 89        Dear Dr Dyllan Escalante:    Thank you for your recent referral of Luisito Reed  Please review the attached re-evaluation summary from Eve's recent visit  Please verify that you agree with the plan of care by signing the attached order  If you have any questions or concerns, please do not hesitate to call  I sincerely appreciate the opportunity to share in the care of one of your patients and hope to have another opportunity to work with you in the near future  Sincerely,    Kenan Gonzalez, PT      Referring Provider:      I certify that I have read the below Plan of Care and certify the need for these services furnished under this plan of treatment while under my care  Dhiraj Parker DO  [address] [employer] [id number]  Via Fax: [phone]          PT Re-Evaluation     Today's date: 3/16/2022  Patient name: Luisito Reed  : 1955  MRN: [id number]  Referring provider: Pamela Guzman DO  Dx:   Encounter Diagnosis     ICD-10-CM    1  Gait abnormality  R26 9    2  Balance problem  R26 89                   Assessment  Assessment details: PT notes the patient with WNL ROM but continuation of decrease strength t/o the bilateral hip and LE with demonstration of impaired gait pattern and balance leading to increase pain levels and functional limitations with need for continuation of skilled therapy for 4 weeks with focus on gait/balance, strengthening, manual therapy, analgesic modalities, and update/review of HEP     Impairments: abnormal gait, abnormal or restricted ROM, activity intolerance, impaired balance, impaired physical strength, lacks appropriate home exercise program and pain with function  Understanding of Dx/Px/POC: good   Prognosis: fair    Goals  ST  Initiate HEP-MET  2  Decrease pain levels by 25-50%-Partial MET   3  Increase strength by 1-2 mm grades-Partial MET  4  Increase ROM by 25-50%-Partial MET   LT  Decrease limitations with standing, walking and stairs-Partial MET  2  Decrease limitations with transfers and ADL-Partial MET  3  Decrease limitations with squatting and lifting-Partial MET  4   DC with HEP-Progressing     Plan  Plan details: PT notes review of POC and findings with patient who is in agreement with PT recommendations of continuation of skilled therapy  Patient would benefit from: skilled physical therapy  Planned modality interventions: thermotherapy: hydrocollator packs  Planned therapy interventions: manual therapy, neuromuscular re-education, patient education, self care, strengthening, stretching, therapeutic exercise, home exercise program, graded exercise, gait training and balance  Frequency: 2x week  Duration in weeks: 4  Treatment plan discussed with: patient        Subjective Evaluation    History of Present Illness  Mechanism of injury: Patient reports a decline in walking about 6 months ago from insidious onset with balance deficits and decrease tolerance  Patient reports feeling her activity level has declined with COVID as well  Patient denies any dizziness or spinning but just of balance  Patient reports difficulty with standing, walking, transfers, stairs, ADL, balance, lifting and carrying  Patient went to PCP for f/u and spoke with MD about walking and was referred to OPPT to address gait/balance deficits  Patient reports she is retired with significant PMH of GERD, fibromyalgia, IBS, bilateral TKA, and chronic pain syndrome with medical marijuana use  Presently the patient has attended 13 sessions of skilled therapy and feels improvement but is unable to quantify improvement    Patient reports her walking and stairs are improved but continuation of difficulty with balance, lifting and carrying activities with need for continuation of skilled therapy  Patient reports frequent flare-ups of the hip and lumbar symptoms with need to modify daily activities to allow time to recoperate and decrease symptoms  Pain  Current pain rating: 3  At best pain ratin  At worst pain ratin  Location: Lumbar spine and bilateral hip   Quality: dull ache and discomfort  Relieving factors: rest  Aggravating factors: stair climbing, walking, standing and lifting  Progression: improved    Treatments  Current treatment: physical therapy  Patient Goals  Patient goals for therapy: decreased pain, improved balance, increased motion, increased strength and independence with ADLs/IADLs          Objective     Tenderness   Left Knee   Tenderness in the lateral joint line and medial joint line  Right Knee   Tenderness in the lateral joint line and medial joint line       Neurological Testing     Reflexes   Left   Patellar (L4): normal (2+)  Achilles (S1): normal (2+)    Right   Patellar (L4): normal (2+)  Achilles (S1): normal (2+)    Active Range of Motion   Left Hip   Normal active range of motion    Right Hip   Normal active range of motion  Left Knee   Normal active range of motion    Right Knee   Normal active range of motion  Left Ankle/Foot   Normal active range of motion    Right Ankle/Foot   Normal active range of motion    Strength/Myotome Testing     Left Hip   Planes of Motion   Flexion: 4  Extension: 4+  Abduction: 4  Adduction: 5  External rotation: 4-  Internal rotation: 4-    Right Hip   Planes of Motion   Flexion: 4  Extension: 4+  Abduction: 4  Adduction: 5  External rotation: 4-  Internal rotation: 4-    Left Knee   Flexion: 4+  Extension: 4  Quadriceps contraction: good    Right Knee   Flexion: 4+  Extension: 4  Quadriceps contraction: fair    Left Ankle/Foot   Normal strength    Right Ankle/Foot   Normal strength    Ambulation Observational Gait   Gait: asymmetric   Decreased walking speed, stride length and right stance time  Additional Observational Gait Details  PT notes decrease hip and knee flex, decrease stance on the right, decrease step length and decrease shandra with rating of fair+ with static and dynamic activities               Precautions:  PMH Fibromyalgia, GERD, IBS, bilateral TKA, chronic pain     Manuals 2/23 2/24 3/4 3/11 3/16   Bilateral Quad, piriformis and gastroc with manual hip traction  15 min   Bilat hip and LE with manual lumbar traction  15 min with bilateral LE and lumbar traction  15 min with bilateral LE and lumbar traction  15' 15 min with Bilat LE and lumbar traction                             Neuro Re-Ed        Front lunge  10x Bilat   Min Squat  10x Bilat  Min Squat  10 Bilat  Min Squat  10X Bilat  NT    Monster walk         Side step and squat  4x10 Feet Red  NT NT     Backwards walk         Tandem and side step walk on foam  NT NT NT     BOSU March         Front and retro step over NT NT NT     Bridge and with ABD  2x10 Each   Green  2x10 Bridge only  With Tball  10x5" Hold  10/2 2x10 and with Tball   10x5" Hold    Supine Tball ABD crunch  2x10   3" Hold  10x3" Hold  10x3" Hold  10x3'' 10x5" Hold    [place] Walks   F/B, S/S  NT NT  NT     Seated 3 way trunk flexion 10x5" Hold   Each  10x5" Hold   Each  10x5" Hold   Each  10x5'' 10x5" Hold Each    Stand lumbar extension against the bar  10x5" Hold  105" Hold  10x5" Hold  10x5'' 10x5" Hold    Stand OAL    10x3" Hold  Bilat  10x3'' 10x3" Hold Bilat    Rocker board    Tandem F/B  10x Each  NT NT                   Ther Ex        Nu-step 10 min L3  10 min L3  10 min L5  L5  10' 10 min L3    Stair HR/TR  NT NT 2x10 Bilat  6" step  2/10  6'' NT   Front and lateral step up  NT NT 10x Bilat  6" step  10x B  6'' step NT   Supine LTR  10x5" Hold   Bilat  10x5" Hold Bilat  10x5" Hold Bilat  nt 10x5" Hold Bilat                           HEP update/review Ther Activity                        Gait Training                        Modalities        MHP to the bilateral hip and/or knees 15 min MHP to the LB  15 min MHP to the LB  15 min   MHP to the LB  15 min  MHP  LB,seated 15 min   MHP and bilat Knees

## 2022-03-17 ENCOUNTER — OFFICE VISIT (OUTPATIENT)
Dept: PHYSICAL THERAPY | Facility: CLINIC | Age: 67
End: 2022-03-17
Payer: COMMERCIAL

## 2022-03-17 DIAGNOSIS — R26.89 BALANCE PROBLEM: ICD-10-CM

## 2022-03-17 DIAGNOSIS — R26.9 GAIT ABNORMALITY: Primary | ICD-10-CM

## 2022-03-17 PROCEDURE — 97140 MANUAL THERAPY 1/> REGIONS: CPT | Performed by: PHYSICAL THERAPIST

## 2022-03-17 PROCEDURE — 97110 THERAPEUTIC EXERCISES: CPT | Performed by: PHYSICAL THERAPIST

## 2022-03-17 PROCEDURE — 97112 NEUROMUSCULAR REEDUCATION: CPT | Performed by: PHYSICAL THERAPIST

## 2022-03-23 ENCOUNTER — OFFICE VISIT (OUTPATIENT)
Dept: PHYSICAL THERAPY | Facility: CLINIC | Age: 67
End: 2022-03-23
Payer: COMMERCIAL

## 2022-03-23 DIAGNOSIS — R26.9 GAIT ABNORMALITY: Primary | ICD-10-CM

## 2022-03-23 DIAGNOSIS — R26.89 BALANCE PROBLEM: ICD-10-CM

## 2022-03-23 PROCEDURE — 97110 THERAPEUTIC EXERCISES: CPT | Performed by: PHYSICAL THERAPIST

## 2022-03-23 PROCEDURE — 97140 MANUAL THERAPY 1/> REGIONS: CPT | Performed by: PHYSICAL THERAPIST

## 2022-03-23 PROCEDURE — 97112 NEUROMUSCULAR REEDUCATION: CPT | Performed by: PHYSICAL THERAPIST

## 2022-03-23 NOTE — PROGRESS NOTES
Daily Note     Today's date: 3/23/2022  Patient name: Sundar Montes  : 1955  MRN: [id number]  Referring provider: Juanita Covarrubias DO  Dx:   Encounter Diagnosis     ICD-10-CM    1  Gait abnormality  R26 9    2  Balance problem  R26 89                   Subjective:  Patient reports she was outside helping her  cut up and move some fallen trees over the weekend with minimal soreness in the LB and bilateral knees with performance  Patient reports she was happy with the tolerance to increase in activity  Objective: See treatment diary below      Assessment: Tolerated treatment well  PT notes continuation of decrease ROM and strength t/o the bilateral hip and LE with demonstration of impaired gait pattern and balance with need for continuation of skilled therapy  Plan: Continue per plan of care        Precautions:  PMH Fibromyalgia, GERD, IBS, bilateral TKA, chronic pain     Manuals 3/4 3/11 3/16 3/17 3/23   Bilateral Quad, piriformis and gastroc with manual hip traction  15 min with bilateral LE and lumbar traction  15' 15 min with Bilat LE and lumbar traction   15 min with Bilat LE and lumbar traction  15 min with Bilat LE and manual lumbar traction                            Neuro Re-Ed        Front lunge  10 Bilat  Min Squat  10X Bilat  NT  NT NT   Monster walk         Side step and squat  NT       Backwards walk         Tandem and side step walk on foam  NT       BOSU March         Front and retro step over [place] and with ABD  With Tball  10x5" Hold  10/2 2x10 and with Tball   10x5" Hold  2x10 and with ABD  2x10   Green  2x10 with ABD  Blue    Supine Tball ABD crunch  10x3" Hold  10x3'' 10x5" Hold  10x5" Hold  2x10   3" Hold    [place] Walks   F/B, S/S  NT       Seated 3 way trunk flexion 10x5" Hold   Each  10x5'' 10x5" Hold Each  10x5" Hold   Each  10x5" Hold Each    Stand lumbar extension against the bar  10x5" Hold  10x5'' 10x5" Hold  NT 10x5" Hold    Stand OAL  10x3" Hold  Bilat  10x3'' 10x3" Hold Bilat  NT NT    Rocker board  Tandem F/B  10x Each  NT NT 10x F/B   Each  20x Each   F/B                    Ther Ex        Nu-step 10 min L5  L5  10' 10 min L3  10 min L3  10 min L4    Stair HR/TR  2x10 Bilat  6" step  2/10  6'' NT 2x10 Bilat  6" step  2x10 Bilat  6" step    Front and lateral step up  10x Bilat  6" step  10x B  6'' step NT 10x Bilat  6" step  2x10 Bilat  6" step    Supine LTR  10x5" Hold Bilat  nt 10x5" Hold Bilat 10x5" Hold Bilat   10x5" Hold Bilat    Leg press    10x 65#   10x 75#  NT                   HEP update/review         Ther Activity                        Gait Training                        Modalities        MHP to the bilateral hip and/or knees 15 min   MHP to the LB  15 min  MHP  LB,seated 15 min   MHP and bilat Knees  15 min   MHP and Bilat   Knees  15 min Bilat LE and knees

## 2022-03-24 NOTE — PROGRESS NOTES
Daily Note     Today's date: 3/25/2022  Patient name: Nael Rainey  : 1955  MRN: [id number]  Referring provider: Lata Ruiz DO  Dx:   Encounter Diagnosis     ICD-10-CM    1  Gait abnormality  R26 9    2  Balance problem  R26 89                   Subjective:  Patient reports the legs have been feeling good lately with increase tolerance to standing and walking activities with minimal soreness  Objective: See treatment diary below      Assessment: Tolerated treatment well  PT notes continuation of decrease ROM and strength t/o the bilateral knees and LE with demonstration of impaired gait pattern and balance with need for continuation of skilled therapy  Plan: Continue per plan of care        Precautions:  PMH Fibromyalgia, GERD, IBS, bilateral TKA, chronic pain     Manuals 3/11 3/16 3/17 3/23 3/25   Bilateral Quad, piriformis and gastroc with manual hip traction  15' 15 min with Bilat LE and lumbar traction   15 min with Bilat LE and lumbar traction  15 min with Bilat LE and manual lumbar traction  15 min with Bilat  LE and manual lumbar traction                            Neuro Re-Ed        Front lunge  10X Bilat  NT  NT NT 2x10    Monster walk         Side step and squat         Backwards walk         Tandem and side step walk on foam         BOSU March      2 min    Front and retro step over        [place] and with ABD  10/2 2x10 and with Tball   10x5" Hold  2x10 and with ABD  2x10   Green  2x10 with ABD  Blue  2x10 With ABD  Blue and With Tball 10x5" Hold    Supine Tball ABD crunch  10x3'' 10x5" Hold  10x5" Hold  2x10   3" Hold  2x10   3" Hold    [place] Walks   F/B, S/S         Seated 3 way trunk flexion 10x5'' 10x5" Hold Each  10x5" Hold   Each  10x5" Hold Each     Stand lumbar extension against the bar  10x5'' 10x5" Hold  NT 10x5" Hold     Stand OAL  10x3'' 10x3" Hold Bilat  NT NT     Rocker board  NT NT 10x F/B   Each  20x Each   F/B  20x Each   F/B                    Ther Ex Nu-step L5  10' 10 min L3  10 min L3  10 min L4  10 min L4    Stair HR/TR  2/10  6'' NT 2x10 Bilat  6" step  2x10 Bilat  6" step     Front and lateral step up  10x B  6'' step NT 10x Bilat  6" step  2x10 Bilat  6" step     Supine LTR  nt 10x5" Hold Bilat 10x5" Hold Bilat   10x5" Hold Bilat  10x5" Hold   Bilat    Leg press   10x 65#   10x 75#  NT 10x 75#   10x 85#                    HEP update/review         Ther Activity                        Gait Training                        Modalities        MHP to the bilateral hip and/or knees 15 min  MHP  LB,seated 15 min   MHP and bilat Knees  15 min   MHP and Bilat   Knees  15 min Bilat LE and knees  15 min   Bilat LE and Knees

## 2022-03-25 ENCOUNTER — OFFICE VISIT (OUTPATIENT)
Dept: PHYSICAL THERAPY | Facility: CLINIC | Age: 67
End: 2022-03-25
Payer: COMMERCIAL

## 2022-03-25 DIAGNOSIS — R26.89 BALANCE PROBLEM: ICD-10-CM

## 2022-03-25 DIAGNOSIS — R26.9 GAIT ABNORMALITY: Primary | ICD-10-CM

## 2022-03-25 PROCEDURE — 97140 MANUAL THERAPY 1/> REGIONS: CPT | Performed by: PHYSICAL THERAPIST

## 2022-03-25 PROCEDURE — 97110 THERAPEUTIC EXERCISES: CPT | Performed by: PHYSICAL THERAPIST

## 2022-03-25 PROCEDURE — 97112 NEUROMUSCULAR REEDUCATION: CPT | Performed by: PHYSICAL THERAPIST

## 2022-03-29 NOTE — PROGRESS NOTES
Daily Note     Today's date: 3/30/2022  Patient name: Hiro Mosley  : 1955  MRN: [id number]  Referring provider: Jade Saldaña DO  Dx:   Encounter Diagnosis     ICD-10-CM    1  Gait abnormality  R26 9    2  Balance problem  R26 89                   Subjective:  Patient reports having a bad day with the cool and damp weather with 6/10 pain levels in the bilateral knees and hips  Patient reports feeling better post session with pain levels decreased to 4/10 level  Patient reports she is satisfied with plan of transition to HEP next session  Objective: See treatment diary below      Assessment: Tolerated treatment fair  PT notes the patient is approaching therapy goals so PT will plan on transition to HEP next session  Plan: Potential discharge next visit       Precautions:  PMH Fibromyalgia, GERD, IBS, bilateral TKA, chronic pain     Manuals 3/16 3/17 3/23 3/25 3/30   Bilateral Quad, piriformis and gastroc with manual hip traction  15 min with Bilat LE and lumbar traction   15 min with Bilat LE and lumbar traction  15 min with Bilat LE and manual lumbar traction  15 min with Bilat  LE and manual lumbar traction  15 min with Bilat LE and manual lumbar traction                            Neuro Re-Ed        Front lunge  NT  NT NT 2x10     Monster walk         Side step and squat         Backwards walk         Tandem and side step walk on foam              2 min     Front and retro step over        [place] and with ABD  2x10 and with Tball   10x5" Hold  2x10 and with ABD  2x10   Green  2x10 with ABD  Blue  2x10 With ABD  Blue and With Tball 10x5" Hold  2x10 with ABD  Blue and with Tball 10x5" Hold    Supine Tball ABD crunch  10x5" Hold  10x5" Hold  2x10   3" Hold  2x10   3" Hold  2x10   3" Hold    [place] Walks   F/B, S/S         Seated 3 way trunk flexion 10x5" Hold Each  10x5" Hold   Each  10x5" Hold Each      Stand lumbar extension against the bar  10x5" Hold  NT 10x5" Hold   10x5" Hold    Stand OAL  10x3" Hold Bilat  NT NT      Rocker board  NT 10x F/B   Each  20x Each   F/B  20x Each   F/B                     Ther Ex        Nu-step 10 min L3  10 min L3  10 min L4  10 min L4  10 min L3    Stair HR/TR  NT 2x10 Bilat  6" step  2x10 Bilat  6" step      Front and lateral step up  NT 10x Bilat  6" step  2x10 Bilat  6" step      Supine LTR  10x5" Hold Bilat 10x5" Hold Bilat   10x5" Hold Bilat  10x5" Hold   Bilat  10x5" Hold   Bilat    Leg press  10x 65#   10x 75#  NT 10x 75#   10x 85#     SAQ/LAQ     2x10   3" Hold Each 2#            HEP update/review         Ther Activity                        Gait Training                        Modalities        MHP to the bilateral hip and/or knees 15 min   MHP and bilat Knees  15 min   MHP and Bilat   Knees  15 min Bilat LE and knees  15 min   Bilat LE and Knees  15 min Bilat LE and knees

## 2022-03-30 ENCOUNTER — OFFICE VISIT (OUTPATIENT)
Dept: PHYSICAL THERAPY | Facility: CLINIC | Age: 67
End: 2022-03-30
Payer: COMMERCIAL

## 2022-03-30 DIAGNOSIS — R26.89 BALANCE PROBLEM: ICD-10-CM

## 2022-03-30 DIAGNOSIS — R26.9 GAIT ABNORMALITY: Primary | ICD-10-CM

## 2022-03-30 PROCEDURE — 97110 THERAPEUTIC EXERCISES: CPT | Performed by: PHYSICAL THERAPIST

## 2022-03-30 PROCEDURE — 97140 MANUAL THERAPY 1/> REGIONS: CPT | Performed by: PHYSICAL THERAPIST

## 2022-03-30 PROCEDURE — 97112 NEUROMUSCULAR REEDUCATION: CPT | Performed by: PHYSICAL THERAPIST

## 2022-03-31 NOTE — PROGRESS NOTES
PT Discharge    Today's date: 2022  Patient name: Radha Leonard  : 1955  MRN: [id number]  Referring provider: Ghassan Robles DO  Dx:   Encounter Diagnosis     ICD-10-CM    1  Gait abnormality  R26 9    2  Balance problem  R26 89                   Assessment  Assessment details: PT notes the patient with WNL ROM with improved strength t/o the bilateral hip and LE with demonstration of improved gait pattern and balance  PT feels the patient has met MMI with current course of OPPT so PT will transition to HEP  Impairments: abnormal gait, abnormal or restricted ROM, activity intolerance, impaired balance, impaired physical strength, lacks appropriate home exercise program and pain with function  Understanding of Dx/Px/POC: good   Prognosis: fair    Goals  ST  Initiate HEP-MET  2  Decrease pain levels by 25-50%-Partial MET   3  Increase strength by 1-2 mm grades-Partial MET  4  Increase ROM by 25-50%-Partial MET   LT  Decrease limitations with standing, walking and stairs-Partial MET  2  Decrease limitations with transfers and ADL-Partial MET  3  Decrease limitations with squatting and lifting-Partial MET  4   DC with HEP-MET    Plan  Plan details: Transition to HEP  Patient would benefit from: skilled physical therapy  Planned modality interventions: thermotherapy: hydrocollator packs  Planned therapy interventions: manual therapy, neuromuscular re-education, patient education, self care, strengthening, stretching, therapeutic exercise, home exercise program, graded exercise, gait training and balance  Frequency: 2x week  Duration in weeks: 1  Treatment plan discussed with: patient and PCP        Subjective Evaluation    History of Present Illness  Mechanism of injury: Patient reports a decline in walking about 6 months ago from insidious onset with balance deficits and decrease tolerance  Patient reports feeling her activity level has declined with COVID as well    Patient denies any dizziness or spinning but just of balance  Patient reports difficulty with standing, walking, transfers, stairs, ADL, balance, lifting and carrying  Patient went to PCP for f/u and spoke with MD about walking and was referred to OPPT to address gait/balance deficits  Patient reports she is retired with significant PMH of GERD, fibromyalgia, IBS, bilateral TKA, and chronic pain syndrome with medical marijuana use  Presently the patient has attended 18 sessions of skilled therapy and feels improvement but is unable to quantify improvement  Patient reports the legs are feeling better with improved flexibility and strength  Patient reports she is now able to tolerate walking and doing activities outside her house with the improvement  Patient reports she is happy with current status and feels she is ready for a transition to [facility]  Pain  Current pain ratin  At best pain ratin  At worst pain ratin  Location: Lumbar spine and bilateral hip   Quality: dull ache and discomfort  Relieving factors: rest  Aggravating factors: stair climbing, walking, standing and lifting  Progression: improved    Treatments  Current treatment: physical therapy  Patient Goals  Patient goals for therapy: decreased pain, improved balance, increased motion, increased strength and independence with ADLs/IADLs          Objective     Tenderness   Left Knee   Tenderness in the lateral joint line and medial joint line  Right Knee   Tenderness in the lateral joint line and medial joint line       Neurological Testing     Reflexes   Left   Patellar (L4): normal (2+)  Achilles (S1): normal (2+)    Right   Patellar (L4): normal (2+)  Achilles (S1): normal (2+)    Active Range of Motion   Left Hip   Normal active range of motion    Right Hip   Normal active range of motion  Left Knee   Normal active range of motion    Right Knee   Normal active range of motion  Left Ankle/Foot   Normal active range of motion    Right Ankle/Foot Normal active range of motion    Strength/Myotome Testing     Left Hip   Planes of Motion   Flexion: 4+  Extension: 4+  Abduction: 4+  Adduction: 5  External rotation: 4+  Internal rotation: 4+    Right Hip   Planes of Motion   Flexion: 4+  Extension: 4+  Abduction: 4+  Adduction: 5  External rotation: 4+  Internal rotation: 4+    Left Knee   Flexion: 5  Extension: 4+  Quadriceps contraction: good    Right Knee   Flexion: 5  Extension: 4+  Quadriceps contraction: good    Left Ankle/Foot   Normal strength    Right Ankle/Foot   Normal strength    Ambulation     Observational Gait   Gait: within functional limits   Walking speed, stride length, left stance time and right stance time within functional limits                Precautions:  PMH Fibromyalgia, GERD, IBS, bilateral TKA, chronic pain     Manuals 3/17 3/23 3/25 3/30 4/1   Bilateral Quad, piriformis and gastroc with manual hip traction  15 min with Bilat LE and lumbar traction  15 min with Bilat LE and manual lumbar traction  15 min with Bilat  LE and manual lumbar traction  15 min with Bilat LE and manual lumbar traction  15 min                            Neuro Re-Ed        Front lunge  NT NT 2x10      Monster walk         Side step and squat         Backwards walk         Tandem and side step walk on foam         BOSU March    2 min      Front and retro step over        [place] and with ABD  2x10 and with ABD  2x10   Green  2x10 with ABD  Blue  2x10 With ABD  Blue and With Tball 10x5" Hold  2x10 with ABD  Blue and with Tball 10x5" Hold     Supine Tball ABD crunch  10x5" Hold  2x10   3" Hold  2x10   3" Hold  2x10   3" Hold     [place] Walks   F/B, S/S         Seated 3 way trunk flexion 10x5" Hold   Each  10x5" Hold Each       Stand lumbar extension against the bar  NT 10x5" Hold   10x5" Hold     Stand OAL  NT NT       Rocker board  10x F/B   Each  20x Each   F/B  20x Each   F/B                      Ther Ex        Nu-step 10 min L3  10 min L4  10 min L4  10 min L3 10 min L3    Stair HR/TR  2x10 Bilat  6" step  2x10 Bilat  6" step       Front and lateral step up  10x Bilat  6" step  2x10 Bilat  6" step       Supine LTR  10x5" Hold Bilat   10x5" Hold Bilat  10x5" Hold   Bilat  10x5" Hold   Bilat     Leg press 10x 65#   10x 75#  NT 10x 75#   10x 85#      SAQ/LAQ    2x10   3" Hold Each 2#             HEP update/review      30 min    Ther Activity                        Gait Training                        Modalities        MHP to the bilateral hip and/or knees 15 min   MHP and Bilat   Knees  15 min Bilat LE and knees  15 min   Bilat LE and Knees  15 min Bilat LE and knees  15 min Bilat LE and knees

## 2022-04-01 ENCOUNTER — OFFICE VISIT (OUTPATIENT)
Dept: PHYSICAL THERAPY | Facility: CLINIC | Age: 67
End: 2022-04-01
Payer: COMMERCIAL

## 2022-04-01 DIAGNOSIS — R26.89 BALANCE PROBLEM: ICD-10-CM

## 2022-04-01 DIAGNOSIS — R26.9 GAIT ABNORMALITY: Primary | ICD-10-CM

## 2022-04-01 PROCEDURE — 97110 THERAPEUTIC EXERCISES: CPT | Performed by: PHYSICAL THERAPIST

## 2022-04-01 PROCEDURE — 97140 MANUAL THERAPY 1/> REGIONS: CPT | Performed by: PHYSICAL THERAPIST

## 2022-04-01 PROCEDURE — 97535 SELF CARE MNGMENT TRAINING: CPT | Performed by: PHYSICAL THERAPIST

## 2022-04-01 NOTE — LETTER
2022    Roxanne Baum DO  [address] [address]    Patient: Genoveva Aguiar   YOB: 1955   Date of Visit: 2022     Encounter Diagnosis     ICD-10-CM    1  Gait abnormality  R26 9    2  Balance problem  R26 89        Dear Dr Sonja Gray:    Thank you for your recent referral of Genoveva Aguiar  Please review the attached discharge summary from [facility]'s recent visit  Please verify that you agree with the plan of care by signing the attached order  If you have any questions or concerns, please do not hesitate to call  I sincerely appreciate the opportunity to share in the care of one of your patients and hope to have another opportunity to work with you in the near future  Sincerely,    Brenden Robles, PT      Referring Provider:      I certify that I have read the below Plan of Care and certify the need for these services furnished under this plan of treatment while under my care  Roxanne Baum DO  [address] [id number]  Via Fax: [phone]          PT Discharge    Today's date: 2022  Patient name: Genoveva Aguiar  : 1955  MRN: [id number]  Referring provider: Sachin Jett DO  Dx:   Encounter Diagnosis     ICD-10-CM    1  Gait abnormality  R26 9    2  Balance problem  R26 89                   Assessment  Assessment details: PT notes the patient with WNL ROM with improved strength t/o the bilateral hip and LE with demonstration of improved gait pattern and balance  PT feels the patient has met MMI with current course of OPPT so PT will transition to HEP  Impairments: abnormal gait, abnormal or restricted ROM, activity intolerance, impaired balance, impaired physical strength, lacks appropriate home exercise program and pain with function  Understanding of Dx/Px/POC: good   Prognosis: fair    Goals  ST  Initiate HEP-MET  2  Decrease pain levels by 25-50%-Partial MET   3    Increase strength by 1-2 mm grades-Partial MET  4  Increase ROM by 25-50%-Partial MET   LT  Decrease limitations with standing, walking and stairs-Partial MET  2  Decrease limitations with transfers and ADL-Partial MET  3  Decrease limitations with squatting and lifting-Partial MET  4   DC with HEP-MET    Plan  Plan details: Transition to HEP  Patient would benefit from: skilled physical therapy  Planned modality interventions: thermotherapy: hydrocollator packs  Planned therapy interventions: manual therapy, neuromuscular re-education, patient education, self care, strengthening, stretching, therapeutic exercise, home exercise program, graded exercise, gait training and balance  Frequency: 2x week  Duration in weeks: 1  Treatment plan discussed with: patient and PCP        Subjective Evaluation    History of Present Illness  Mechanism of injury: Patient reports a decline in walking about 6 months ago from insidious onset with balance deficits and decrease tolerance  Patient reports feeling her activity level has declined with COVID as well  Patient denies any dizziness or spinning but just of balance  Patient reports difficulty with standing, walking, transfers, stairs, ADL, balance, lifting and carrying  Patient went to PCP for f/u and spoke with MD about walking and was referred to OPPT to address gait/balance deficits  Patient reports she is retired with significant PMH of GERD, fibromyalgia, IBS, bilateral TKA, and chronic pain syndrome with medical marijuana use  Presently the patient has attended 18 sessions of skilled therapy and feels improvement but is unable to quantify improvement  Patient reports the legs are feeling better with improved flexibility and strength  Patient reports she is now able to tolerate walking and doing activities outside her house with the improvement  Patient reports she is happy with current status and feels she is ready for a transition to HEP     Pain  Current pain rating: 2  At best pain ratin  At worst pain ratin  Location: Lumbar spine and bilateral hip   Quality: dull ache and discomfort  Relieving factors: rest  Aggravating factors: stair climbing, walking, standing and lifting  Progression: improved    Treatments  Current treatment: physical therapy  Patient Goals  Patient goals for therapy: decreased pain, improved balance, increased motion, increased strength and independence with ADLs/IADLs          Objective     Tenderness   Left Knee   Tenderness in the lateral joint line and medial joint line  Right Knee   Tenderness in the lateral joint line and medial joint line  Neurological Testing     Reflexes   Left   Patellar (L4): normal (2+)  Achilles (S1): normal (2+)    Right   Patellar (L4): normal (2+)  Achilles (S1): normal (2+)    Active Range of Motion   Left Hip   Normal active range of motion    Right Hip   Normal active range of motion  Left Knee   Normal active range of motion    Right Knee   Normal active range of motion  Left Ankle/Foot   Normal active range of motion    Right Ankle/Foot   Normal active range of motion    Strength/Myotome Testing     Left Hip   Planes of Motion   Flexion: 4+  Extension: 4+  Abduction: 4+  Adduction: 5  External rotation: 4+  Internal rotation: 4+    Right Hip   Planes of Motion   Flexion: 4+  Extension: 4+  Abduction: 4+  Adduction: 5  External rotation: 4+  Internal rotation: 4+    Left Knee   Flexion: 5  Extension: 4+  Quadriceps contraction: good    Right Knee   Flexion: 5  Extension: 4+  Quadriceps contraction: good    Left Ankle/Foot   Normal strength    Right Ankle/Foot   Normal strength    Ambulation     Observational Gait   Gait: within functional limits   Walking speed, stride length, left stance time and right stance time within functional limits                Precautions:  PMH Fibromyalgia, GERD, IBS, bilateral TKA, chronic pain     Manuals 3/17 3/23 3/25 3/30 4/1   Bilateral Quad, piriformis and gastroc with manual hip traction  15 min with Bilat LE and lumbar traction  15 min with Bilat LE and manual lumbar traction  15 min with Bilat  LE and manual lumbar traction  15 min with Bilat LE and manual lumbar traction  15 min                            Neuro Re-Ed        Front lunge  NT NT 2x10      Monster walk         Side step and squat         Backwards walk         Tandem and side step walk on foam         BOSU March    2 min      Front and retro step over        [place] and with ABD  2x10 and with ABD  2x10   Green  2x10 with ABD  Blue  2x10 With ABD  Blue and With Tball 10x5" Hold  2x10 with ABD  Blue and with Tball 10x5" Hold     Supine Tball ABD crunch  10x5" Hold  2x10   3" Hold  2x10   3" Hold  2x10   3" Hold     Yue Walks   F/B, S/S         Seated 3 way trunk flexion 10x5" Hold   Each  10x5" Hold Each       Stand lumbar extension against the bar  NT 10x5" Hold   10x5" Hold     Stand OAL  NT NT       Rocker board  10x F/B   Each  20x Each   F/B  20x Each   F/B                      Ther Ex        Nu-step 10 min L3  10 min L4  10 min L4  10 min L3  10 min L3    Stair HR/TR  2x10 Bilat  6" step  2x10 Bilat  6" step       Front and lateral step up  10x Bilat  6" step  2x10 Bilat  6" step       Supine LTR  10x5" Hold Bilat   10x5" Hold Bilat  10x5" Hold   Bilat  10x5" Hold   Bilat     Leg press 10x 65#   10x 75#  NT 10x 75#   10x 85#      SAQ/LAQ    2x10   3" Hold Each 2#             HEP update/review      30 min    Ther Activity                        Gait Training                        Modalities        MHP to the bilateral hip and/or knees 15 min   MHP and Bilat   Knees  15 min Bilat LE and knees  15 min   Bilat LE and Knees  15 min Bilat LE and knees  15 min Bilat LE and knees

## 2024-03-21 RX ORDER — DULOXETIN HYDROCHLORIDE 30 MG/1
30 CAPSULE, DELAYED RELEASE ORAL DAILY
COMMUNITY
Start: 2023-11-29

## 2024-03-21 RX ORDER — MAGNESIUM CITRATE
296 SOLUTION, ORAL ORAL
COMMUNITY

## 2024-03-21 RX ORDER — CLINDAMYCIN PHOSPHATE 10 UG/ML
LOTION TOPICAL
COMMUNITY
Start: 2023-09-27

## 2024-03-26 ENCOUNTER — OFFICE VISIT (OUTPATIENT)
Dept: PODIATRY | Facility: CLINIC | Age: 69
End: 2024-03-26
Payer: COMMERCIAL

## 2024-03-26 ENCOUNTER — APPOINTMENT (OUTPATIENT)
Dept: RADIOLOGY | Facility: MEDICAL CENTER | Age: 69
End: 2024-03-26
Payer: COMMERCIAL

## 2024-03-26 VITALS
SYSTOLIC BLOOD PRESSURE: 133 MMHG | HEIGHT: 64 IN | DIASTOLIC BLOOD PRESSURE: 83 MMHG | HEART RATE: 80 BPM | WEIGHT: 206 LBS | BODY MASS INDEX: 35.17 KG/M2

## 2024-03-26 DIAGNOSIS — M79.672 HEEL PAIN, BILATERAL: ICD-10-CM

## 2024-03-26 DIAGNOSIS — M76.822 POSTERIOR TIBIALIS TENDINITIS OF BOTH LOWER EXTREMITIES: ICD-10-CM

## 2024-03-26 DIAGNOSIS — M21.41 PES PLANUS OF BOTH FEET: ICD-10-CM

## 2024-03-26 DIAGNOSIS — M79.671 HEEL PAIN, BILATERAL: Primary | ICD-10-CM

## 2024-03-26 DIAGNOSIS — M76.821 POSTERIOR TIBIALIS TENDINITIS OF BOTH LOWER EXTREMITIES: ICD-10-CM

## 2024-03-26 DIAGNOSIS — M79.7 FIBROMYALGIA: ICD-10-CM

## 2024-03-26 DIAGNOSIS — M79.672 HEEL PAIN, BILATERAL: Primary | ICD-10-CM

## 2024-03-26 DIAGNOSIS — M79.671 HEEL PAIN, BILATERAL: ICD-10-CM

## 2024-03-26 DIAGNOSIS — M21.42 PES PLANUS OF BOTH FEET: ICD-10-CM

## 2024-03-26 PROCEDURE — 99204 OFFICE O/P NEW MOD 45 MIN: CPT | Performed by: STUDENT IN AN ORGANIZED HEALTH CARE EDUCATION/TRAINING PROGRAM

## 2024-03-26 PROCEDURE — 73630 X-RAY EXAM OF FOOT: CPT

## 2024-03-26 RX ORDER — MELOXICAM 7.5 MG/1
7.5 TABLET ORAL DAILY
Qty: 10 TABLET | Refills: 0 | Status: SHIPPED | OUTPATIENT
Start: 2024-03-26

## 2024-03-26 RX ORDER — ONDANSETRON 4 MG/1
4 TABLET, ORALLY DISINTEGRATING ORAL EVERY 8 HOURS PRN
COMMUNITY
Start: 2024-02-06

## 2024-03-26 NOTE — PATIENT INSTRUCTIONS
Foot Pain Home Therapy    Stretch. Place painful foot in back with heel on ground and lean against wall. Do not lift heel off of ground. You will feel the stretch in the calf muscles and possibly the heel. Hold the stretch for 20-30 seconds. Do this at least 5-6 times per day. It is best done after exercise when your muscles are warm.  Wear supportive shoes at all times. Avoid flip-flops, flat sandals, barefoot walking (never walk barefoot, even at home). Generally avoid shoes that are too flexible and bend in the arch. Your shoes should only slightly bend in the toe area, not the middle. Running sneakers are often the best choice.  Supportive sneaker brands: Greenberg, On Cloud, Hoka, Altra, New Balance, Asics, Mizuno  [employer] (discount if mention Dr knutson)  Fleet Feet [place]  [facility]   [employer] [place]  Supportive daily shoe brands: Vionic, Orthofeet, Deanna, Dansko, Chacos, [place]a, Teva, Birkenstock  Supportive home shoes: Oof[employer] sterlingka (recovery slides)*  Purchase over the counter topical pain creams such as Voltarin gel, biofreeze, or CBD cream - will need to apply 2-3 times per day for benefit. + deep tissue massage.   Look in to over the counter shoe inserts/arch supports such as Dananberg arch supports. These are all available on [employer].com as well as their individual website's.   Worldrat: Vasyli+Dananberg 1st Ray Orthotic, Medium, 1st Ray Function, Medium Density, Full-Length Insole, Heat Molding Optional, Best All Around Orthotic, Functional Biomechanical Control for Pain Relief, Black Yellow (18963) : Health & Household        Achilles Tendon Stretching Exercises    A) Standing Gastrocnemius stretch  Place hands on wall or chair. If using wall, put your hands at eye level.  Step the leg you want to stretch behind you. Keep your back heel on the floor and point your toes straight ahead or slightly inward towards the heel of the opposite foot.   Bend your knee  toward the wall while keeping your back leg straight.  Lean toward the wall until you feel a gentle stretch in you calf of the straight leg. Don't lean so far that you feel pain.  Hold for 15 seconds. Complete 3 reps.    B) Standing soleus stretch  Place your hands on the wall or chair. If using wall, put your hands at eye level.  Step the leg you want to stretch behind you (your back foot will need to be closer to the front foot than the above stretch). Keep your back heel on the floor and point your toes straight ahead or slightly inward towards the heel of the opposite foot.   Bend both your front and back knee at the same time (may help to stick your butt out). You do not need to lean towards the wall, just bend the knees.   Lean toward the wall until you feel a gentle stretch in you calf of the straight leg. Don't lean so far that you feel pain.  Hold for 15 seconds. Complete 3 reps.          Keep these tips and tricks in mind to get the most out of your stretching;  Take your time - move slowly, whether you are deepening into a stretch or changing positions. This will limit the risk of injury & discomfort.  Avoid bouncing - quick sudden movements will only worsen achilles tendon issues. Stay relaxed during stretch.  Keep your heel down and toes straight ahead or slightly inward - this will allow the achilles tendon to stretch properly.   Stop if you feel pain - Don't strain or force your muscle. If you feel sharp pain, stop immediately.

## 2024-03-26 NOTE — PROGRESS NOTES
Assessment/Plan:     Diagnoses and all orders for this visit:    Heel pain, bilateral  -     X-ray foot right 3+ views; Future  -     X-ray foot left 3+ views; Future  -     Orthotics B/L  -     Ambulatory referral to Physical Therapy; Future  -     meloxicam (Mobic) 7.5 mg tablet; Take 1 tablet (7.5 mg total) by mouth daily    Posterior tibialis tendinitis of both lower extremities  -     Orthotics B/L  -     Ambulatory referral to Physical Therapy; Future  -     meloxicam (Mobic) 7.5 mg tablet; Take 1 tablet (7.5 mg total) by mouth daily    Pes planus of both feet  -     Orthotics B/L  -     Ambulatory referral to Physical Therapy; Future    Fibromyalgia    Other orders  -     magnesium citrate oral solution; Take 296 mL by mouth (Patient not taking: Reported on 3/26/2024)  -     clindamycin (CLEOCIN T) 1 % lotion; apply to affected area ON FACE AND CHIN DAILY  -     rOPINIRole HCl (REQUIP XL PO);  (Patient not taking: Reported on 3/26/2024)  -     ROPINIROLE HCL PO;  (Patient not taking: Reported on 3/26/2024)  -     DULoxetine (CYMBALTA) 30 mg delayed release capsule; Take 30 mg by mouth daily  -     Celecoxib (CELEBREX PO); Celebrex  -     ondansetron (ZOFRAN-ODT) 4 mg disintegrating tablet; Take 4 mg by mouth every 8 (eight) hours as needed for nausea or vomiting        Imaging Reviewed at this visit (I personally reviewed/independently interpreted images and reports in PACS)  XR right and left foot WB 6v 3/26/24: No acute osseous abnormalities noted. Plantar and posterior heel spur. Mild pes planus. Left with slightly elevated 1st met.       IMPRESSION:  B/L foot pain (heel, medial ankle, lateral foot). Ddx include plantar fasciitis, Cruz's nerve entrapment, intersection syndrome of the FHL and FDL at the knot of Noel, TTS, PTTD, degenerative changes (calcaneal spurs, arthrosis of the joints of the foot), and inflammatory conditions of the ligaments and fascia of the foot and ankle.   Fibromyalgia       PLAN:  I reviewed clinical exam and radiographic imaging (XR) with patient in detail today. I have discussed with the patient the pathophysiology of this diagnosis and reviewed how the examination correlates with this diagnosis.  PCP note from 1/30/24 reviewed   I explained at length differentials as above and biomechanical structure which may lead to such. Consider fibromyalgia as compounding factor.   I recommend stiff bottomed sneakers/shoes (ex Asics, Vionic, New balance, Greenberg, etc) for daily use and Eloisa Mathew (recovery slides) for in home use.   I advised pt to obtain OTC Dananberg arch supports. I rx'd CMOs to be done at [facility].   I ordered the addition of PT at this visit to aide in reduction of equinus and also address the plantar fascia and gastroc aponeurosis with graston technique.   I rx'd short course of meloxicam today and discussed close monitoring for stomach bleed symptoms which she is to stop the medication immediately if she has such. She is not to take this with celebrex (pt states she henry snot take celebrex regularly)   Bracing for PTTD PRN  RTC 6wks     Subjective:      Patient ID: Eve Ayala is a 69 y.o. female.    Eve presents to clinic today concerning B/L heel pain and outer foot pain. Present 1-2 month. Notes weight did change a bit. No difference in shoe gear however inserts help a bit (Abeo). -N/T/B. Pain is rather random, nothing really makes it better. Has fibromyalgia on medical marijuana.         The following portions of the patient's history were reviewed and updated as appropriate: allergies, current medications, past family history, past medical history, past social history, past surgical history, and problem list.    Review of Systems   Constitutional:  Negative for activity change, chills and fever.   HENT: Negative.     Respiratory:  Negative for cough, chest tightness and shortness of breath.    Cardiovascular:  Negative for chest pain and leg swelling.  "  Endocrine: Negative.    Genitourinary: Negative.    Musculoskeletal:  Positive for gait problem.   Neurological:  Negative for numbness.   Psychiatric/Behavioral: Negative.  Negative for agitation and behavioral problems.          Objective:      /83   Pulse 80   Ht 5' 4\" (1.626 m)   Wt 93.4 kg (206 lb)   BMI 35.36 kg/m²          Physical Exam  Constitutional:       Appearance: Normal appearance. She is not ill-appearing.   Cardiovascular:      Pulses: Normal pulses.      Comments: Bilateral DP & PT pulses 2/4. CRT WNL. Pedal hair present. Feet warm and well perfused.   Pulmonary:      Effort: Pulmonary effort is normal. No respiratory distress.   Musculoskeletal:         General: Tenderness (B/L foot pain at plantar calcaneus at medial and central tubercle at insertion of plantar fascia. medial ankle along course of PT tendon to insertion on navicular.) and deformity (pes planus) present. Normal range of motion.      Comments: There is decreased ankle DF with knee extended and flexed indicating gastroc-soleal equinus.   B/L WB exam exhibits collapse of MLA, heel position valgus. Able to perform single an double heel raise however single is weak and with some tenderness.   Bilateral ankle, STJ, TNJ, TMTJ, MTPJ ROM WNL and without pain. LE muscle strength WNL.   Skin:     General: Skin is warm.      Capillary Refill: Capillary refill takes less than 2 seconds.      Findings: No erythema or lesion.   Neurological:      General: No focal deficit present.      Mental Status: She is alert and oriented to person, place, and time.      Sensory: No sensory deficit.      Comments: Gross sensation intact. Denies N/T/B to B/L feet.    Psychiatric:         Mood and Affect: Mood normal.           "

## 2024-04-03 ENCOUNTER — EVALUATION (OUTPATIENT)
Dept: PHYSICAL THERAPY | Facility: CLINIC | Age: 69
End: 2024-04-03
Payer: COMMERCIAL

## 2024-04-03 DIAGNOSIS — M21.42 PES PLANUS OF BOTH FEET: ICD-10-CM

## 2024-04-03 DIAGNOSIS — M21.41 PES PLANUS OF BOTH FEET: ICD-10-CM

## 2024-04-03 DIAGNOSIS — M79.671 HEEL PAIN, BILATERAL: Primary | ICD-10-CM

## 2024-04-03 DIAGNOSIS — M76.821 POSTERIOR TIBIALIS TENDINITIS OF BOTH LOWER EXTREMITIES: ICD-10-CM

## 2024-04-03 DIAGNOSIS — M79.672 HEEL PAIN, BILATERAL: Primary | ICD-10-CM

## 2024-04-03 DIAGNOSIS — M76.822 POSTERIOR TIBIALIS TENDINITIS OF BOTH LOWER EXTREMITIES: ICD-10-CM

## 2024-04-03 PROCEDURE — 97535 SELF CARE MNGMENT TRAINING: CPT

## 2024-04-03 PROCEDURE — 97110 THERAPEUTIC EXERCISES: CPT

## 2024-04-03 PROCEDURE — 97161 PT EVAL LOW COMPLEX 20 MIN: CPT

## 2024-04-03 NOTE — PROGRESS NOTES
PT Evaluation     Today's date: 4/3/2024  Patient name: Eve Ayala  : 1955  MRN: [id number]  Referring provider: Muriel Nguyễn DPM  Dx:   Encounter Diagnosis     ICD-10-CM    1. Heel pain, bilateral  M79.671 Ambulatory referral to Physical Therapy    M79.672       2. Posterior tibialis tendinitis of both lower extremities  M76.821 Ambulatory referral to Physical Therapy    M76.822       3. Pes planus of both feet  M21.41 Ambulatory referral to Physical Therapy    M21.42           Start Time: 1300  Stop Time: 1400  Total time in clinic (min): 60 minutes    Assessment  Assessment details: The patient is a 69 y.o. female presenting to Physical Therapy today with bilateral heel pain. Upon completion of the initial evaluation the patient is demonstrating with limitations in b/l ankle mobility, b/l ankle strength, b/l ankle stability, and pain. Patient is currently having difficulty with activities such as ambulation, standing tolerance, and stair navigation due to symptomatology in b/l heels. Patient would benefit from a course of skilled Physical Therapy services to address functional limitations to return to prior level of function. Thank you for your referral.   Impairments: abnormal gait, abnormal muscle tone, abnormal or restricted ROM, activity intolerance, impaired balance, impaired physical strength, lacks appropriate home exercise program and pain with function    Symptom irritability: lowUnderstanding of Dx/Px/POC: good   Prognosis: good    Goals  ST.) Patient will initiate HEP Independently   2.) Patient will have a 50% reduction in overall symptoms   3.) Patient will demonstrate improved strength evidenced by a 1-2 MMT grade increase  4.) Patient will demonstrate improved standing tolerance >/= 1 hour w/o symptoms  5.) Patient will demonstrate improved ability to ascend/descend full flight of stairs w/o symptoms    LT.) Patient will be d/c to an HEP independently  2.) Patient will  improve functional abilities evidenced by FOTO scores  3.) Eliminate pain with functional activity   4.) Patient will demonstrate improved ankle stability evidenced by SLS time >/= 30 seconds   5.) Return to PLOF     Plan  Plan details: Plan of care was reviewed and discussed thoroughly with the patient on their current condition. Patient was instructed on a HEP with written instructions. Patient is in agreement with PT recommendations and will attend Physical Therapy 2x/week for the next 8 weeks to address current deficits.    Patient would benefit from: PT eval and skilled physical therapy  Referral necessary: No  Planned modality interventions: cryotherapy and thermotherapy: hydrocollator packs  Planned therapy interventions: balance, flexibility, functional ROM exercises, graded activity, graded exercise, graded motor, home exercise program, IASTM, joint mobilization, manual therapy, neuromuscular re-education, patient education, self care, strengthening, stretching, therapeutic activities, therapeutic exercise and therapeutic training  Frequency: 2x week  Duration in weeks: 8  Plan of Care beginning date: 4/3/2024  Plan of Care expiration date: 5/29/2024  Treatment plan discussed with: patient      Subjective Evaluation    History of Present Illness  Mechanism of injury: The patient reports today with b/l heel and lateral foot pain that started a couple of months ago. She states no trauma or injury to the R ankle region. She notes wearing inserts (Abeo) that help a bit but no significant change to symptoms. She followed up with Podiatry and had X-ray imaging performed that revealed b/l Achilles enthesophyte and a small plantar calcaneal enthesophyte. She is currently taking Meloxicam with slight relief in symptoms with this. She is having difficulty with functional activities such as standing tolerance, walking, and stair navigation. She is now referred to OPPT.           Recurrent probem    Quality of life:  good    Patient Goals  Patient goals for therapy: decreased pain, improved balance, increased motion, increased strength and independence with ADLs/IADLs    Pain  Current pain ratin  At best pain ratin  At worst pain ratin  Location: b/l heel pain, lateral foot pain  Quality: sharp and needle-like  Relieving factors: medications and rest (orthotics)  Aggravating factors: standing and walking (first steps in the morning, laying down)    Social Support    Employment status: not working    Diagnostic Tests  X-ray: abnormal  Treatments  Current treatment: physical therapy      Objective     Observations   Left Ankle/Foot   Positive for deformity.     Right Ankle/Foot   Positive for deformity.     Additional Observation Details  Pes planus deformity bilaterally.     Tenderness   Left Ankle/Foot   Tenderness in the Achilles insertion, navicular and posterior tibial tendon.     Right Ankle/Foot   Tenderness in the Achilles insertion, navicular and posterior tibial tendon.     Additional Tenderness Details  TTP to medial calcaneal tubercle bilaterally.    Neurological Testing     Sensation     Ankle/Foot   Left Ankle/Foot   Intact: light touch    Right Ankle/Foot   Intact: light touch     Active Range of Motion   Left Hip   Normal active range of motion    Right Hip   Normal active range of motion  Left Knee   Normal active range of motion    Right Knee   Normal active range of motion  Left Ankle/Foot   Dorsiflexion (ke): -10 degrees   Dorsiflexion (kf): -5 degrees   Plantar flexion: WFL  Inversion: WFL  Eversion: WFL    Right Ankle/Foot   Dorsiflexion (ke): -7 degrees   Dorsiflexion (kf): -3 degrees   Plantar flexion: WFL  Inversion: WFL  Eversion: WFL    Additional Active Range of Motion Details  Gastroc-soleus equinus noted.    Passive Range of Motion   Left Ankle/Foot    Dorsiflexion (ke): -5 degrees   Dorsiflexion (kf): WFL    Right Ankle/Foot    Dorsiflexion (ke): -3 degrees   Dorsiflexion (kf): WFL  "    Strength/Myotome Testing     Left Hip   Planes of Motion   Flexion: 4  Abduction: 4  Adduction: 4    Right Hip   Planes of Motion   Flexion: 4  Abduction: 4  Adduction: 4    Left Knee   Flexion: 4+  Extension: 4+  Quadriceps contraction: fair    Right Knee   Flexion: 4+  Extension: 4+  Quadriceps contraction: fair    Left Ankle/Foot   Dorsiflexion: 4-  Plantar flexion: 4  Inversion: 4-  Eversion: 4-    Right Ankle/Foot   Dorsiflexion: 4-  Plantar flexion: 4  Inversion: 4-  Eversion: 4-    Ambulation     Comments   Patient ambulates without an AD. She demonstrates decreased stride length, decreased step length, decreased step width, and decreased shandra. She ascends/descends stair reciprocally.    Functional Assessment        Forward Step Up 8\"   Left Leg  Within functional limits.     Right Leg  Within functional limits.     Single Leg Stance   Left: 20 seconds  Right: 20 seconds    Comments  DL Heel raise: WNL with reported calf tightness.  SL Heel raise: weakness with tenderness bilaterally.      Flowsheet Rows      Flowsheet Row Most Recent Value   PT/OT G-Codes    Current Score 37   Projected Score 53               Precautions: Fibromyalgia,       4/3            Manuals             IASTM Plantar Fascia              Manual Gastroc/Soleus Stretching              JM             STM Achilles, Posterior Tibialis Tendon             Neuro Re-Ed             SLS             Tandem Stance                                                                               Ther Ex             Gastroc Stretch  20\" Hold x3 ea            Soleus Stretch  20\" Hold x3 ea            Plantar Fascia Stretch  10\" Hold x5  ea            Plantar Fascia Soft Tissue Mobilization              HR/TR              TB Inv/Ev/DF/PF             Towel Scrunches              NU step for muscular endurance             HEP Instruction  BM            Ther Activity                                       Gait Training                                     "   Modalities             Ice

## 2024-04-05 ENCOUNTER — TELEPHONE (OUTPATIENT)
Dept: PODIATRY | Facility: CLINIC | Age: 69
End: 2024-04-05

## 2024-04-09 ENCOUNTER — OFFICE VISIT (OUTPATIENT)
Dept: PHYSICAL THERAPY | Facility: CLINIC | Age: 69
End: 2024-04-09
Payer: COMMERCIAL

## 2024-04-09 DIAGNOSIS — M21.41 PES PLANUS OF BOTH FEET: ICD-10-CM

## 2024-04-09 DIAGNOSIS — M76.822 POSTERIOR TIBIALIS TENDINITIS OF BOTH LOWER EXTREMITIES: ICD-10-CM

## 2024-04-09 DIAGNOSIS — M79.671 HEEL PAIN, BILATERAL: Primary | ICD-10-CM

## 2024-04-09 DIAGNOSIS — M21.42 PES PLANUS OF BOTH FEET: ICD-10-CM

## 2024-04-09 DIAGNOSIS — M79.672 HEEL PAIN, BILATERAL: Primary | ICD-10-CM

## 2024-04-09 DIAGNOSIS — M76.821 POSTERIOR TIBIALIS TENDINITIS OF BOTH LOWER EXTREMITIES: ICD-10-CM

## 2024-04-09 PROCEDURE — 97140 MANUAL THERAPY 1/> REGIONS: CPT

## 2024-04-09 PROCEDURE — 97110 THERAPEUTIC EXERCISES: CPT

## 2024-04-09 NOTE — PROGRESS NOTES
"Daily Note     Today's date: 2024  Patient name: Eve Ayala  : 1955  MRN: [id number]  Referring provider: Muriel Nguyễn DPM  Dx:   Encounter Diagnosis     ICD-10-CM    1. Heel pain, bilateral  M79.671     M79.672       2. Posterior tibialis tendinitis of both lower extremities  M76.821     M76.822       3. Pes planus of both feet  M21.41     M21.42           Start Time: 1345  Stop Time: 1445  Total time in clinic (min): 60 minutes    Subjective: Patient reports performance of home exercise program has been going well this far and has been helping with symptoms. She notes having an appointment with [facility] and ordered new sneakers that have been helping with pain.      Objective: See treatment diary below      Assessment: Tolerated treatment well. We initiated IASTM to the plantar fascia this afternoon. Patient had a mild increase in soreness following manual therapy. Encouraged patient to apply ice to the region following PT session this afternoon to aid in symptoms. We progressed the current program with the addition of b/l ankle strengthening and mobility exercise. Patient tolerated progressions well. She would benefit from continued PT to address functional limitations to return to prior level of function.       Plan: Continue per plan of care.      Precautions: Fibromyalgia,       4/3 4/9           Manuals             IASTM Plantar Fascia   BM           Manual Gastroc/Soleus Stretching   BM           JM  BM           STM Achilles, Posterior Tibialis Tendon  BM           Neuro Re-Ed             SLS             Tandem Stance                                                                               Ther Ex             Gastroc Stretch  20\" Hold x3 ea 20\" Hold x3 ea           Soleus Stretch  20\" Hold x3 ea 20\" Hold x3 ea            Plantar Fascia Stretch  10\" Hold x5  ea 10\" Hold x5 ea           Plantar Fascia Soft Tissue Mobilization   3' ea           HR/TR   4\" step 2x10            TB " "Inv/Ev/DF/PF  L2 x10 ea           Standing DF Stretch   10\" Hold x5           Towel Scrunches                NU step for muscular endurance             HEP Instruction  BM            Ther Activity                                       Gait Training                                       Modalities             Ice  10' Post-Exercise                              "

## 2024-04-12 ENCOUNTER — OFFICE VISIT (OUTPATIENT)
Dept: PHYSICAL THERAPY | Facility: CLINIC | Age: 69
End: 2024-04-12
Payer: COMMERCIAL

## 2024-04-12 DIAGNOSIS — M76.821 POSTERIOR TIBIALIS TENDINITIS OF BOTH LOWER EXTREMITIES: ICD-10-CM

## 2024-04-12 DIAGNOSIS — M79.671 HEEL PAIN, BILATERAL: Primary | ICD-10-CM

## 2024-04-12 DIAGNOSIS — M21.42 PES PLANUS OF BOTH FEET: ICD-10-CM

## 2024-04-12 DIAGNOSIS — M79.672 HEEL PAIN, BILATERAL: Primary | ICD-10-CM

## 2024-04-12 DIAGNOSIS — M76.822 POSTERIOR TIBIALIS TENDINITIS OF BOTH LOWER EXTREMITIES: ICD-10-CM

## 2024-04-12 DIAGNOSIS — M21.41 PES PLANUS OF BOTH FEET: ICD-10-CM

## 2024-04-12 PROCEDURE — 97140 MANUAL THERAPY 1/> REGIONS: CPT

## 2024-04-12 PROCEDURE — 97110 THERAPEUTIC EXERCISES: CPT

## 2024-04-12 NOTE — PROGRESS NOTES
"Daily Note     Today's date: 2024  Patient name: Eve Ayala  : 1955  MRN: [id number]  Referring provider: Muriel Nguyễn DPM  Dx:   Encounter Diagnosis     ICD-10-CM    1. Heel pain, bilateral  M79.671     M79.672       2. Posterior tibialis tendinitis of both lower extremities  M76.821     M76.822       3. Pes planus of both feet  M21.41     M21.42           Start Time: 1015  Stop Time: 1105  Total time in clinic (min): 50 minutes    Subjective: Patient reports b/l ankle soreness after last treatment session. She notes symptoms are focal to the inside and outside of the feet at this time.        Objective: See treatment diary below      Assessment: Tolerated treatment well. We modified treatment today due to increased b/l foot soreness this morning. Patient had good tolerance to modified treatment session. Patient would benefit from continued PT to address symptomatology and functional limitations to return to prior level of function.       Plan: Continue per plan of care.      Precautions: Fibromyalgia,       4/3 4/9 4/12          Manuals             IASTM Plantar Fascia B  BM BM          Manual Gastroc/Soleus Stretching   BM BM          JM  BM           STM Achilles, Posterior Tibialis Tendon  BM BM          Neuro Re-Ed             SLS             Tandem Stance                                                                               Ther Ex             Gastroc Stretch  20\" Hold x3 ea 20\" Hold x3 ea 20\" Hold x4 ea          Soleus Stretch  20\" Hold x3 ea 20\" Hold x3 ea  20\" Hold x4 ea          Plantar Fascia Stretch  10\" Hold x5  ea 10\" Hold x5 ea 10\" Hold x5 ea          Plantar Fascia Soft Tissue Mobilization   3' ea 3' ea          HR/TR   4\" step 2x10  4\" step 2x10           TB Inv/Ev/DF/PF  L2 x10 ea NT          Standing DF Stretch   10\" Hold x5 NT           Towel Scrunches                NU step for muscular endurance             HEP Instruction  BM            Ther Activity                 "                       Gait Training                                       Modalities             Ice  10' Post-Exercise  10' Post Exercise

## 2024-04-15 ENCOUNTER — OFFICE VISIT (OUTPATIENT)
Dept: PHYSICAL THERAPY | Facility: CLINIC | Age: 69
End: 2024-04-15
Payer: COMMERCIAL

## 2024-04-15 DIAGNOSIS — M21.41 PES PLANUS OF BOTH FEET: ICD-10-CM

## 2024-04-15 DIAGNOSIS — M76.822 POSTERIOR TIBIALIS TENDINITIS OF BOTH LOWER EXTREMITIES: ICD-10-CM

## 2024-04-15 DIAGNOSIS — M79.671 HEEL PAIN, BILATERAL: Primary | ICD-10-CM

## 2024-04-15 DIAGNOSIS — M21.42 PES PLANUS OF BOTH FEET: ICD-10-CM

## 2024-04-15 DIAGNOSIS — M76.821 POSTERIOR TIBIALIS TENDINITIS OF BOTH LOWER EXTREMITIES: ICD-10-CM

## 2024-04-15 DIAGNOSIS — M79.672 HEEL PAIN, BILATERAL: Primary | ICD-10-CM

## 2024-04-15 PROCEDURE — 97110 THERAPEUTIC EXERCISES: CPT

## 2024-04-15 PROCEDURE — 97140 MANUAL THERAPY 1/> REGIONS: CPT

## 2024-04-15 NOTE — PROGRESS NOTES
"Daily Note     Today's date: 4/15/2024  Patient name: Eve Ayala  : 1955  MRN: [id number]  Referring provider: Muriel Nguyễn DPM  Dx:   Encounter Diagnosis     ICD-10-CM    1. Heel pain, bilateral  M79.671     M79.672       2. Posterior tibialis tendinitis of both lower extremities  M76.821     M76.822       3. Pes planus of both feet  M21.41     M21.42           Start Time: 1445  Stop Time: 1530  Total time in clinic (min): 45 minutes    Subjective: Patient reports a flare up of symptoms this afternoon and attributes this to fibromyalgia. She notes having a lot of discomfort in her L heel and outside of the foot.      Objective: See treatment diary below      Assessment: Tolerated treatment well. PT focused treatment on soft tissue mobilization of b/l Achilles and posterior tibialis tendons this evening. Patient responded well to manual therapy with improvement in gait mechanics post treatment session. We will look to initiate RPW next session to address symptomatology in b/l feet. Patient would benefit from continued PT at this time.      Plan: Continue per plan of care.      Precautions: Fibromyalgia       4/3 4/9 4/12 4/15         Manuals             IASTM Plantar Fascia B  BM BM          Manual Gastroc/Soleus Stretching   BM BM          JM  BM           STM Achilles, Posterior Tibialis Tendon b/l   BM BM BM          RPW             Neuro Re-Ed             SLS             Tandem Stance                                                                               Ther Ex             Gastroc Stretch  20\" Hold x3 ea 20\" Hold x3 ea 20\" Hold x4 ea 20\" Hold x4 ea         Soleus Stretch  20\" Hold x3 ea 20\" Hold x3 ea  20\" Hold x4 ea 20\" Hold x4 ea         Plantar Fascia Stretch  10\" Hold x5  ea 10\" Hold x5 ea 10\" Hold x5 ea NT          Plantar Fascia Soft Tissue Mobilization   3' ea 3' ea 3' ea         HR/TR   4\" step 2x10  4\" step 2x10  NT          TB Inv/Ev/DF/PF  L2 x10 ea NT L2 x10 ea         Standing " "DF Stretch   10\" Hold x5 NT           Towel Scrunches                NU step for muscular endurance             HEP Instruction  BM            Ther Activity                                       Gait Training                                       Modalities             Ice  10' Post-Exercise  10' Post Exercise  NT                               "

## 2024-04-18 ENCOUNTER — OFFICE VISIT (OUTPATIENT)
Dept: PHYSICAL THERAPY | Facility: CLINIC | Age: 69
End: 2024-04-18
Payer: COMMERCIAL

## 2024-04-18 DIAGNOSIS — M21.42 PES PLANUS OF BOTH FEET: ICD-10-CM

## 2024-04-18 DIAGNOSIS — M79.671 HEEL PAIN, BILATERAL: Primary | ICD-10-CM

## 2024-04-18 DIAGNOSIS — M21.41 PES PLANUS OF BOTH FEET: ICD-10-CM

## 2024-04-18 DIAGNOSIS — M79.672 HEEL PAIN, BILATERAL: Primary | ICD-10-CM

## 2024-04-18 DIAGNOSIS — M76.822 POSTERIOR TIBIALIS TENDINITIS OF BOTH LOWER EXTREMITIES: ICD-10-CM

## 2024-04-18 DIAGNOSIS — M76.821 POSTERIOR TIBIALIS TENDINITIS OF BOTH LOWER EXTREMITIES: ICD-10-CM

## 2024-04-18 PROCEDURE — 97140 MANUAL THERAPY 1/> REGIONS: CPT

## 2024-04-18 PROCEDURE — 97110 THERAPEUTIC EXERCISES: CPT

## 2024-04-18 NOTE — PROGRESS NOTES
"Daily Note     Today's date: 2024  Patient name: Eve Ayala  : 1955  MRN: [id number]  Referring provider: Muriel Nguyễn DPM  Dx:   Encounter Diagnosis     ICD-10-CM    1. Heel pain, bilateral  M79.671     M79.672       2. Posterior tibialis tendinitis of both lower extremities  M76.821     M76.822       3. Pes planus of both feet  M21.41     M21.42           Start Time: 1400  Stop Time: 1445  Total time in clinic (min): 45 minutes    Subjective: Patient reports b/l heel pain has been feeling better since last treatment session. She notes her flare up of symptoms is decreasing and is starting to feel better.      Objective: See treatment diary below      Assessment: Tolerated treatment well. We initiated radial pressure wave this afternoon to address symptomatology in b/l heels this afternoon. Patient tolerated treatment with a notable reduction in symptoms. She had good tolerance to exercise program and stretching of the soleus/gastroc musculature. Patient would benefit from continued PT to address symptoms and functional limitations to return to prior level of function.      Plan: Continue per plan of care.      Precautions: Fibromyalgia       4/3 4/9 4/12 4/15 4/18        Manuals             IASTM Plantar Fascia B  BM BM          Manual Gastroc/Soleus Stretching   BM BM          JM  BM           STM Achilles, Posterior Tibialis Tendon b/l   BM BM BM  BM         RPW b/l Heel, PF     1.5 Bar, Freq: 12 Hz 3.13 min b/l        Neuro Re-Ed             SLS             Tandem Stance                                                                               Ther Ex             Gastroc Stretch  20\" Hold x3 ea 20\" Hold x3 ea 20\" Hold x4 ea 20\" Hold x4 ea 20\" Hold x4 ea        Soleus Stretch  20\" Hold x3 ea 20\" Hold x3 ea  20\" Hold x4 ea 20\" Hold x4 ea 20\" Hold x4 ea         Plantar Fascia Stretch  10\" Hold x5  ea 10\" Hold x5 ea 10\" Hold x5 ea NT          Plantar Fascia Soft Tissue Mobilization   3' ea 3' " "ea 3' ea 3' ea        HR/TR   4\" step 2x10  4\" step 2x10  NT          TB Inv/Ev/DF/PF  L2 x10 ea NT L2 x10 ea L2 2x10 ea        Standing DF Stretch   10\" Hold x5 NT           Towel Scrunches                NU step for muscular endurance             HEP Instruction  BM            Ther Activity                                       Gait Training                                       Modalities             Ice  10' Post-Exercise  10' Post Exercise  NT 5 min post TE                                "

## 2024-04-22 ENCOUNTER — OFFICE VISIT (OUTPATIENT)
Dept: PHYSICAL THERAPY | Facility: CLINIC | Age: 69
End: 2024-04-22
Payer: COMMERCIAL

## 2024-04-22 DIAGNOSIS — M79.672 HEEL PAIN, BILATERAL: Primary | ICD-10-CM

## 2024-04-22 DIAGNOSIS — M76.821 POSTERIOR TIBIALIS TENDINITIS OF BOTH LOWER EXTREMITIES: ICD-10-CM

## 2024-04-22 DIAGNOSIS — M21.42 PES PLANUS OF BOTH FEET: ICD-10-CM

## 2024-04-22 DIAGNOSIS — M21.41 PES PLANUS OF BOTH FEET: ICD-10-CM

## 2024-04-22 DIAGNOSIS — M76.822 POSTERIOR TIBIALIS TENDINITIS OF BOTH LOWER EXTREMITIES: ICD-10-CM

## 2024-04-22 DIAGNOSIS — M79.671 HEEL PAIN, BILATERAL: Primary | ICD-10-CM

## 2024-04-22 PROCEDURE — 97110 THERAPEUTIC EXERCISES: CPT

## 2024-04-22 PROCEDURE — 97140 MANUAL THERAPY 1/> REGIONS: CPT

## 2024-04-22 NOTE — PROGRESS NOTES
"Daily Note     Today's date: 2024  Patient name: Eve Ayala  : 1955  MRN: [id number]  Referring provider: Muriel Nguyễn DPM  Dx:   Encounter Diagnosis     ICD-10-CM    1. Heel pain, bilateral  M79.671     M79.672       2. Posterior tibialis tendinitis of both lower extremities  M76.821     M76.822       3. Pes planus of both feet  M21.41     M21.42           Start Time: 1630  Stop Time: 1720  Total time in clinic (min): 50 minutes    Subjective: Patient reports increased b/l heel and outside foot pain this afternoon. She notes doing a lot of gardening prior to treatment today.      Objective: See treatment diary below      Assessment: Tolerated treatment well. Patient is responding well to the initiation of RPW this far with a notable reduction in symptoms post treatment. We will continue to progress patient within tolerance to address symptoms and functional deficits to return to prior level of function. Patient would benefit from continued PT.      Plan: Continue per plan of care.      Precautions: Fibromyalgia       4/3 4/9 4/12 4/15 4/18 4/22     Manuals           IASTM Plantar Fascia B  BM BM        Manual Gastroc/Soleus Stretching   BM BM        JM  BM         STM Achilles, Posterior Tibialis Tendon b/l   BM BM BM  BM  BM     RPW b/l Heel, PF     1.5 Bar, Freq: 12 Hz 3.13 min b/l 1.5 Bar 12 Hz 3.13 min b/l     Neuro Re-Ed           SLS           Tandem Stance                                                                   Ther Ex           Gastroc Stretch  20\" Hold x3 ea 20\" Hold x3 ea 20\" Hold x4 ea 20\" Hold x4 ea 20\" Hold x4 ea 20\" Hold x4 ea     Soleus Stretch  20\" Hold x3 ea 20\" Hold x3 ea  20\" Hold x4 ea 20\" Hold x4 ea 20\" Hold x4 ea  20\" Hold x4 ea     Plantar Fascia Stretch  10\" Hold x5  ea 10\" Hold x5 ea 10\" Hold x5 ea NT        Plantar Fascia Soft Tissue Mobilization   3' ea 3' ea 3' ea 3' ea 3' ea     HR/TR   4\" step 2x10  4\" step 2x10  NT        TB Inv/Ev/DF/PF  L2 x10 ea NT L2 " "x10 ea L2 2x10 ea L3 2x10 ea     Standing DF Stretch   10\" Hold x5 NT         Towel Scrunches              NU step for muscular endurance           HEP Instruction  BM          Ther Activity                                 Gait Training                                 Modalities           Ice  10' Post-Exercise  10' Post Exercise  NT 5 min post TE 5 min post TE                             "

## 2024-04-30 ENCOUNTER — OFFICE VISIT (OUTPATIENT)
Dept: PHYSICAL THERAPY | Facility: CLINIC | Age: 69
End: 2024-04-30
Payer: COMMERCIAL

## 2024-04-30 DIAGNOSIS — M21.42 PES PLANUS OF BOTH FEET: ICD-10-CM

## 2024-04-30 DIAGNOSIS — M79.672 HEEL PAIN, BILATERAL: Primary | ICD-10-CM

## 2024-04-30 DIAGNOSIS — M79.671 HEEL PAIN, BILATERAL: Primary | ICD-10-CM

## 2024-04-30 DIAGNOSIS — M21.41 PES PLANUS OF BOTH FEET: ICD-10-CM

## 2024-04-30 DIAGNOSIS — M76.821 POSTERIOR TIBIALIS TENDINITIS OF BOTH LOWER EXTREMITIES: ICD-10-CM

## 2024-04-30 DIAGNOSIS — M76.822 POSTERIOR TIBIALIS TENDINITIS OF BOTH LOWER EXTREMITIES: ICD-10-CM

## 2024-04-30 PROCEDURE — 97140 MANUAL THERAPY 1/> REGIONS: CPT

## 2024-04-30 PROCEDURE — 97110 THERAPEUTIC EXERCISES: CPT

## 2024-04-30 NOTE — PROGRESS NOTES
"Daily Note     Today's date: 2024  Patient name: Eve Ayala  : 1955  MRN: [id number]  Referring provider: Muriel Nguyễn DPM  Dx:   Encounter Diagnosis     ICD-10-CM    1. Heel pain, bilateral  M79.671     M79.672       2. Posterior tibialis tendinitis of both lower extremities  M76.821     M76.822       3. Pes planus of both feet  M21.41     M21.42           Start Time: 1630  Stop Time: 1730  Total time in clinic (min): 60 minutes    Subjective: Patient reports an increase in b/l heel soreness after last treatment that resolved a day after treatment. She notes symptoms are improving and is currently taking prednisone.       Objective: See treatment diary below      Assessment: Tolerated treatment well. Patient exhibited good technique with therapeutic exercises and would benefit from continued PT to address symptomatology and functional deficits to return to prior level of function.       Plan: Continue per plan of care.      Precautions: Fibromyalgia       4/3 4/9 4/12 4/15 4/18 4/22 4/30    Manuals           IASTM Plantar Fascia B  BM BM        Manual Gastroc/Soleus Stretching   BM BM    BM gastroc     JM  BM         STM Achilles, Posterior Tibialis Tendon b/l   BM BM BM  BM  BM BM    RPW b/l Heel, PF     1.5 Bar, Freq: 12 Hz 3.13 min b/l 1.5 Bar 12 Hz 3.13 min b/l 1.0 Bar 12 HZ , 3.13 min b/l     Neuro Re-Ed           SLS           Tandem Stance                                                                   Ther Ex           Gastroc Stretch  20\" Hold x3 ea 20\" Hold x3 ea 20\" Hold x4 ea 20\" Hold x4 ea 20\" Hold x4 ea 20\" Hold x4 ea 20\" Hold x4 ea    Soleus Stretch  20\" Hold x3 ea 20\" Hold x3 ea  20\" Hold x4 ea 20\" Hold x4 ea 20\" Hold x4 ea  20\" Hold x4 ea 20\" Hold x4 ea    Plantar Fascia Stretch  10\" Hold x5  ea 10\" Hold x5 ea 10\" Hold x5 ea NT        Plantar Fascia Soft Tissue Mobilization   3' ea 3' ea 3' ea 3' ea 3' ea 3' ea    HR/TR   4\" step 2x10  4\" step 2x10  NT        TB Inv/Ev/DF/PF  " "L2 x10 ea NT L2 x10 ea L2 2x10 ea L3 2x10 ea L3 2x10 ea    Standing DF Stretch   10\" Hold x5 NT         Towel Scrunches              NU step for muscular endurance           HEP Instruction  BM          Ther Activity                                 Gait Training                                 Modalities           Ice  10' Post-Exercise  10' Post Exercise  NT 5 min post TE 5 min post TE 8 min post TE                              "

## 2024-05-02 ENCOUNTER — OFFICE VISIT (OUTPATIENT)
Dept: PHYSICAL THERAPY | Facility: CLINIC | Age: 69
End: 2024-05-02
Payer: COMMERCIAL

## 2024-05-02 ENCOUNTER — APPOINTMENT (OUTPATIENT)
Dept: PHYSICAL THERAPY | Facility: CLINIC | Age: 69
End: 2024-05-02
Payer: COMMERCIAL

## 2024-05-02 DIAGNOSIS — M76.821 POSTERIOR TIBIALIS TENDINITIS OF BOTH LOWER EXTREMITIES: ICD-10-CM

## 2024-05-02 DIAGNOSIS — M79.672 HEEL PAIN, BILATERAL: Primary | ICD-10-CM

## 2024-05-02 DIAGNOSIS — M21.41 PES PLANUS OF BOTH FEET: ICD-10-CM

## 2024-05-02 DIAGNOSIS — M79.671 HEEL PAIN, BILATERAL: Primary | ICD-10-CM

## 2024-05-02 DIAGNOSIS — M21.42 PES PLANUS OF BOTH FEET: ICD-10-CM

## 2024-05-02 DIAGNOSIS — M76.822 POSTERIOR TIBIALIS TENDINITIS OF BOTH LOWER EXTREMITIES: ICD-10-CM

## 2024-05-02 PROCEDURE — 97140 MANUAL THERAPY 1/> REGIONS: CPT

## 2024-05-02 PROCEDURE — 97110 THERAPEUTIC EXERCISES: CPT

## 2024-05-02 NOTE — PROGRESS NOTES
PT Discharge    Today's date: 2024  Patient name: Eve Ayala  : 1955  MRN: [id number]  Referring provider: Muriel Nguyễn DPM  Dx:   Encounter Diagnosis     ICD-10-CM    1. Heel pain, bilateral  M79.671     M79.672       2. Posterior tibialis tendinitis of both lower extremities  M76.821     M76.822       3. Pes planus of both feet  M21.41     M21.42           Start Time: 1530  Stop Time: 1605  Total time in clinic (min): 35 minutes    Assessment  Assessment details: The patient presents to Physical Therapy today with a notable improvement in symptomatology and function in b/l feet. Patient is happy with her progress to this point and feels that her condition is much more manageable. We will d/c patient today from Physical Therapy services and transition to an independent HEP to continue managing her condition. Patient was encouraged to call the office if she has any questions or concerns.   Understanding of Dx/Px/POC: good   Prognosis: good    Goals  ST.) Patient will initiate HEP Independently MET  2.) Patient will have a 50% reduction in overall symptoms MET  3.) Patient will demonstrate improved strength evidenced by a 1-2 MMT grade increase MET  4.) Patient will demonstrate improved standing tolerance >/= 1 hour w/o symptoms MET  5.) Patient will demonstrate improved ability to ascend/descend full flight of stairs w/o symptoms MET    LT.) Patient will be d/c to an HEP independently MET  2.) Patient will improve functional abilities evidenced by FOTO scores MET  3.) Eliminate pain with functional activity MET  4.) Patient will demonstrate improved ankle stability evidenced by SLS time >/= 30 seconds MET  5.) Return to PLOF MET    Plan  Plan details: Plan of care was reviewed and discussed thoroughly with the patient on their current condition. Patient was instructed on a HEP with written instructions. We will d/c patient today from Physical Therapy services and transition to an  independent HEP to continue managing her condition. Patient was encouraged to call the office if she has any questions or concerns.         Subjective Evaluation    History of Present Illness  Mechanism of injury: The patient reports today with b/l heel and lateral foot pain that started a couple of months ago. She states no trauma or injury to the R ankle region. She notes wearing inserts (Abeo) that help a bit but no significant change to symptoms. She followed up with Podiatry and had X-ray imaging performed that revealed b/l Achilles enthesophyte and a small plantar calcaneal enthesophyte. She is currently taking Meloxicam with slight relief in symptoms with this. She is having difficulty with functional activities such as standing tolerance, walking, and stair navigation. She is now referred to OPPT.     Update 24: The patient reports today with an 85-90% improvement in symptoms and function in both feet. She notes less difficulty with activity and is happy with her progress to this point. She would like to transition to an independent home exercise program today to continue addressing her condition.          Recurrent probem    Quality of life: good    Patient Goals  Patient goal: Goals have been met.  Pain  Current pain ratin  At best pain ratin  At worst pain ratin        Objective     Observations   Left Ankle/Foot   Positive for deformity.     Right Ankle/Foot   Positive for deformity.     Additional Observation Details  Pes planus deformity bilaterally.     Tenderness   Left Ankle/Foot   No tenderness in the Achilles insertion, navicular and posterior tibial tendon.     Right Ankle/Foot   No tenderness in the Achilles insertion, navicular and posterior tibial tendon.     Neurological Testing     Sensation     Ankle/Foot   Left Ankle/Foot   Intact: light touch    Right Ankle/Foot   Intact: light touch     Active Range of Motion   Left Hip   Normal active range of motion    Right Hip  "  Normal active range of motion  Left Knee   Normal active range of motion    Right Knee   Normal active range of motion  Left Ankle/Foot   Dorsiflexion (ke): -1 degrees   Dorsiflexion (kf): 3 degrees   Plantar flexion: WFL  Inversion: WFL  Eversion: WFL    Right Ankle/Foot   Dorsiflexion (ke): 2 degrees   Dorsiflexion (kf): 5 degrees   Plantar flexion: WFL  Inversion: WFL  Eversion: WFL    Additional Active Range of Motion Details  Patient demonstrates improvement in b/l ankle mobility.     Passive Range of Motion   Left Ankle/Foot    Dorsiflexion (ke): 3 degrees   Dorsiflexion (kf): 5 degrees     Right Ankle/Foot    Dorsiflexion (ke): 5 degrees   Dorsiflexion (kf): 7 degrees      Strength/Myotome Testing     Left Hip   Planes of Motion   Flexion: WFL  Abduction: WFL  Adduction: WFL    Right Hip   Planes of Motion   Flexion: WFL  Abduction: WFL  Adduction: WFL    Left Knee   Flexion: WFL  Extension: WFL    Right Knee   Flexion: WFL  Extension: WFL    Left Ankle/Foot   Dorsiflexion: WFL.   Plantar flexion: WFL.   Eversion: WFL.     Right Ankle/Foot   Dorsiflexion: WFL.   Plantar flexion: WFL.   Inversion: WFL.   Eversion: WFL.     Functional Assessment        Forward Step Up 8\"   Left Leg  Within functional limits.     Right Leg  Within functional limits.     Comments  DL Heel raise: WNL   SL Heel raise: WNL               Precautions: Fibromyalgia,     4/3 4/9 4/12 4/15 4/18 4/22 4/30 5/2   Manuals           IASTM Plantar Fascia B  BM BM        Manual Gastroc/Soleus Stretching   BM BM    BM gastroc  BM Gastroc   JM  BM         STM Achilles, Posterior Tibialis Tendon b/l   BM BM BM  BM  BM BM BM   RPW b/l Heel, PF     1.5 Bar, Freq: 12 Hz 3.13 min b/l 1.5 Bar 12 Hz 3.13 min b/l 1.0 Bar 12 HZ , 3.13 min b/l     Neuro Re-Ed           SLS           Tandem Stance                                                                   Ther Ex           Gastroc Stretch  20\" Hold x3 ea 20\" Hold x3 ea 20\" Hold x4 ea 20\" Hold x4 ea " "20\" Hold x4 ea 20\" Hold x4 ea 20\" Hold x4 ea 20\" Hold x4 ea   Soleus Stretch  20\" Hold x3 ea 20\" Hold x3 ea  20\" Hold x4 ea 20\" Hold x4 ea 20\" Hold x4 ea  20\" Hold x4 ea 20\" Hold x4 ea 20\" Hold x4 ea   Plantar Fascia Stretch  10\" Hold x5  ea 10\" Hold x5 ea 10\" Hold x5 ea NT        Plantar Fascia Soft Tissue Mobilization   3' ea 3' ea 3' ea 3' ea 3' ea 3' ea 3' ea   HR/TR   4\" step 2x10  4\" step 2x10  NT        TB Inv/Ev/DF/PF  L2 x10 ea NT L2 x10 ea L2 2x10 ea L3 2x10 ea L3 2x10 ea NT    Standing DF Stretch   10\" Hold x5 NT         Towel Scrunches              NU step for muscular endurance           HEP Instruction  BM       BM    Ther Activity                                 Gait Training                                 Modalities           Ice  10' Post-Exercise  10' Post Exercise  NT 5 min post TE 5 min post TE 8 min post TE NT                      "

## 2024-06-06 ENCOUNTER — OFFICE VISIT (OUTPATIENT)
Dept: PODIATRY | Facility: CLINIC | Age: 69
End: 2024-06-06
Payer: COMMERCIAL

## 2024-06-06 VITALS
BODY MASS INDEX: 34.28 KG/M2 | WEIGHT: 200.8 LBS | SYSTOLIC BLOOD PRESSURE: 117 MMHG | HEIGHT: 64 IN | OXYGEN SATURATION: 94 % | HEART RATE: 85 BPM | DIASTOLIC BLOOD PRESSURE: 76 MMHG

## 2024-06-06 DIAGNOSIS — M76.822 POSTERIOR TIBIALIS TENDINITIS OF BOTH LOWER EXTREMITIES: Primary | ICD-10-CM

## 2024-06-06 DIAGNOSIS — M21.41 PES PLANUS OF BOTH FEET: ICD-10-CM

## 2024-06-06 DIAGNOSIS — M76.821 POSTERIOR TIBIALIS TENDINITIS OF BOTH LOWER EXTREMITIES: Primary | ICD-10-CM

## 2024-06-06 DIAGNOSIS — M79.7 FIBROMYALGIA: ICD-10-CM

## 2024-06-06 DIAGNOSIS — M21.42 PES PLANUS OF BOTH FEET: ICD-10-CM

## 2024-06-06 PROCEDURE — 99213 OFFICE O/P EST LOW 20 MIN: CPT | Performed by: STUDENT IN AN ORGANIZED HEALTH CARE EDUCATION/TRAINING PROGRAM

## 2024-06-06 RX ORDER — TRAZODONE HYDROCHLORIDE 50 MG/1
50 TABLET ORAL
COMMUNITY
Start: 2024-04-12

## 2024-06-06 RX ORDER — PYRIDOXINE HCL (VITAMIN B6) 50 MG
TABLET ORAL
COMMUNITY

## 2024-06-27 ENCOUNTER — EVALUATION (OUTPATIENT)
Dept: PHYSICAL THERAPY | Facility: CLINIC | Age: 69
End: 2024-06-27
Payer: COMMERCIAL

## 2024-06-27 ENCOUNTER — OFFICE VISIT (OUTPATIENT)
Dept: PHYSICAL THERAPY | Facility: CLINIC | Age: 69
End: 2024-06-27
Payer: COMMERCIAL

## 2024-06-27 DIAGNOSIS — M21.41 PES PLANUS OF BOTH FEET: ICD-10-CM

## 2024-06-27 DIAGNOSIS — M79.671 HEEL PAIN, BILATERAL: Primary | ICD-10-CM

## 2024-06-27 DIAGNOSIS — M79.672 PAIN OF BOTH HEELS: Primary | ICD-10-CM

## 2024-06-27 DIAGNOSIS — M21.42 PES PLANUS OF BOTH FEET: ICD-10-CM

## 2024-06-27 DIAGNOSIS — M76.829 PTTD (POSTERIOR TIBIAL TENDON DYSFUNCTION): ICD-10-CM

## 2024-06-27 DIAGNOSIS — M79.672 HEEL PAIN, BILATERAL: Primary | ICD-10-CM

## 2024-06-27 DIAGNOSIS — M79.671 PAIN OF BOTH HEELS: Primary | ICD-10-CM

## 2024-06-27 PROCEDURE — 97760 ORTHOTIC MGMT&TRAING 1ST ENC: CPT | Performed by: PHYSICAL MEDICINE & REHABILITATION

## 2024-06-27 PROCEDURE — 97161 PT EVAL LOW COMPLEX 20 MIN: CPT | Performed by: PHYSICAL MEDICINE & REHABILITATION

## 2024-06-27 NOTE — PROGRESS NOTES
"PT Evaluation     Today's date: 2024  Patient name: Eve Ayala  : 1955  MRN: [id number]  Referring provider: Muriel Nguyễn DPM  Dx:   Encounter Diagnosis     ICD-10-CM    1. Pain of both heels  M79.671     M79.672       2. Pes planus of both feet  M21.41     M21.42       3. PTTD (posterior tibial tendon dysfunction)  M76.829                      SUBJECTIVE:    Per recent MD note: \"Posterior tibialis tendinitis of both lower extremities  -     Ankle Cude ankle/Ankle Brace     Pes planus of both feet  -     Ankle Cude ankle/Ankle Brace     Fibromyalgia     Other orders  -     traZODone (DESYREL) 50 mg tablet; Take 50 mg by mouth daily at bedtime  -     Multiple Vitamin (MULTIVITAMIN ADULT PO)  -     vitamin B-12 (CYANOCOBALAMIN) 50 MCG TABS  -     Multiple Vitamins-Minerals (CENTRUM ADULTS PO)  -     Cetirizine HCl (ZYRTEC PO)              Prior Imaging   XR right and left foot WB 6v 3/26/24: No acute osseous abnormalities noted. Plantar and posterior heel spur. Mild pes planus. Left with slightly elevated 1st met.       IMPRESSION:  B/L foot pain (heel, medial ankle, lateral foot). Ddx include plantar fasciitis, Cruz's nerve entrapment, intersection syndrome of the FHL and FDL at the knot of Noel, TTS, PTTD, degenerative changes (calcaneal spurs, arthrosis of the joints of the foot), and inflammatory conditions of the ligaments and fascia of the foot and ankle.   Fibromyalgia      PLAN:  B/L foot pain with significant improvement with PT, shoegear/arch support change. Pain has resolved to plantar and lateral feet however some discomfort remains to medial ankles after she started gardening on uneven ground. I discussed posterior tendon strain and applied lace up ankle brace to reduce tension there. I discussed AFO in future if CMOs do not provide enough support and if otc brace helps more  C/w stiff bottomed sneakers/shoes (ex Asics, Vionic, New balance, Greenberg, etc) for daily use and Oofos, Hoka " "(recovery slides) for in home use.   Report for CMOs to be done at [facility].   PT notes reviewed today. C/w HEP. Consider returning of medial ankle pain does not improve  PCP note from 1/30/24 reviewed. Bariatric note from 4/3/24 reviewed    RTC 3 months      Subjective:         Subjective  Patient ID: Eve Ayala is a 69 y.o. female.     Eve presents to clinic today concerning f/u B/L heel pain and outer foot pain. B/L foot pain with significant improvement with PT, shoegear/arch support change. PT doing shock wave which felt the best. Pain has resolved to plantar and lateral feet however some discomfort remains to medial ankles after she started gardening on uneven ground after she was done with PT.     \"Present 1-2 month. Notes weight did change a bit. No difference in shoe gear however inserts help a bit (Abeo). -N/T/B. Pain is rather random, nothing really makes it better. Has fibromyalgia on medical marijuana.\" \"    PT IE 6/27:  Chronic hx of L > R foot pain; started in Feb. Did undergo PT which did help; especially with EPAT tx. Also got OTC orthotics which helped. At present notes L foot pain is > R. Pain is 1* located L > R heel (under heel) and achilles/medial side of heel/ankle. Pain is worse with standing too long, being on feet too long, and first getting up in the morning. Pain is better with supportive/solid shoes and OTC inserts. No medication at present. Still doing HEP from PT. No additional tx to date. Referred to PT for CMOs. F/u with MD in 3 months. No n/t or sensory changes.   Wears Asics Women's size 11 standard width.   Retired. Enjoys pottery and gardening.        OBJECTIVE:    Age: 69 y.o.  Weight: 194#    Foot Posture Index Score    Right Foot  Talar dome - +1  Malleolar curve - 0   Calcaneal eversion - -1  Talonavicular bulge - 0  Medial longitudinal arch - 0  Too many toes - 0  Total Score R = 0    Left Foot  Talar dome - +1  Malleolar curve - 0   Calcaneal eversion - " -1  Talonavicular bulge - 0  Medial longitudinal arch - 0  Too many toes - 0  Total Score L = 0    Reference Values  Normal =    0 to +5  Pronated =  +6 to +9 Highly Pronated =  10+  Supinated =   -1 to -4 Highly Supinated = -5 to -12      Objective      Gait Assessment:  Right Stance Phase- RF neutral at IC; slight RF varus into loading response and early stance; mild mid- late stance RF valgus and mild MF /FF pronation late stance; toe out   Left Stance Phase - RF neutral-mild Varus IC into loading response with initial WS over lateral foot; mid-late stance RF valgus and min MF/FF pronation; toe out     AROM/PROM:             MMT          AROM          PROM    Ankle         R          L          R         L          R         L   PF   WNL WNL     DF.   WFL WFL     DF bent knee         EHL         Inv.   WFL WFL     Ever.   WNL WNL     Great toe Extension    WNL WNL       Functional Ankle Dorsiflexion ROM:  NT    Palpation:  Mild ttp mid plantar surface R/L heel , mild ttp medial portion of calcaneus into medial ankle into arch R/L     Observation:  FF varus B   Increased posterior angulation of arch B   Mild L > R RF varus in static standing   Mild redness R/L lateral 5th distal digit only   No notable callusing     Neurological Testing:  Intact light touch t/o R /L foot/ankle     Joint Mobility:        Right                                       Left     Subtalar-                  Mild hypo                               Mild hypo  Midfoot-                   WFL                                   WFL     Forefoot-                 WNL                                   WNL  Talocrural-              Mild hypo                               Mild hypo  First Ray-                WNL                                       WNL    Subtalar Neutral Assessment:  Right-See scans     Left- see scans       ASSESSMENT:    Patient requires custom foot orthosis with B deepened heel cup, B medial longitudinal arch supports, B heel  pads, and potential RF lateral posting (small) to offload painful structures and  to correct altered gait mechanics contributing to pain/sx and functional limitations. Patient is not currently controlled by her motion-controlled shoe and OTC orthotics. Foot/ankle exam, gait evaluation, and Ipad scans were completed this date as noted above. Will consult with orthotist regarding patient presentation and scans. Pt will return to PT for dispensing of orthotics when they arrive. Will review orthotic fit to shoe and pt's foot, wearing schedule, gait assessment with orthotics etc at that time. Pt in agreement with plan with no questions/concerns end of session. Thank you for your referral.       Orthotic goals:  1) Patient will have custom foot orthoses fitted to her shoe.   2) Patient will be compliant with break-in period of custom foot orthoses as prescribed by PT.   3) Patient will be compliant with custom foot orthoses use as prescribed by PT.     Plan:    Planned therapy interventions: orthotic fitting/training  Duration in visits: 2

## 2024-06-27 NOTE — PROGRESS NOTES
"PT Evaluation /Orthotics     Today's date: 2024  Patient name: Eve Ayala  : 1955  MRN: [id number]  Referring provider: Muriel Nguyễn DPM  Dx:   Encounter Diagnosis     ICD-10-CM    1. Heel pain, bilateral  M79.671     M79.672       2. Pes planus of both feet  M21.41     M21.42       3. PTTD (posterior tibial tendon dysfunction)  M76.829                      SUBJECTIVE:    Per recent MD note: \"Posterior tibialis tendinitis of both lower extremities  -     Ankle Cude ankle/Ankle Brace     Pes planus of both feet  -     Ankle Cude ankle/Ankle Brace     Fibromyalgia     Other orders  -     traZODone (DESYREL) 50 mg tablet; Take 50 mg by mouth daily at bedtime  -     Multiple Vitamin (MULTIVITAMIN ADULT PO)  -     vitamin B-12 (CYANOCOBALAMIN) 50 MCG TABS  -     Multiple Vitamins-Minerals (CENTRUM ADULTS PO)  -     Cetirizine HCl (ZYRTEC PO)              Prior Imaging   XR right and left foot WB 6v 3/26/24: No acute osseous abnormalities noted. Plantar and posterior heel spur. Mild pes planus. Left with slightly elevated 1st met.       IMPRESSION:  B/L foot pain (heel, medial ankle, lateral foot). Ddx include plantar fasciitis, Cruz's nerve entrapment, intersection syndrome of the FHL and FDL at the knot of Noel, TTS, PTTD, degenerative changes (calcaneal spurs, arthrosis of the joints of the foot), and inflammatory conditions of the ligaments and fascia of the foot and ankle.   Fibromyalgia      PLAN:  B/L foot pain with significant improvement with PT, shoegear/arch support change. Pain has resolved to plantar and lateral feet however some discomfort remains to medial ankles after she started gardening on uneven ground. I discussed posterior tendon strain and applied lace up ankle brace to reduce tension there. I discussed AFO in future if CMOs do not provide enough support and if otc brace helps more  C/w stiff bottomed sneakers/shoes (ex Asics, Vionic, New balance, Greenberg, etc) for daily use and " "Eloisa Mathew (recovery slides) for in home use.   Report for CMOs to be done at [facility].   PT notes reviewed today. C/w HEP. Consider returning of medial ankle pain does not improve  PCP note from 1/30/24 reviewed. Bariatric note from 4/3/24 reviewed    RTC 3 months      Subjective:         Subjective  Patient ID: Eve Ayala is a 69 y.o. female.     Eve presents to clinic today concerning f/u B/L heel pain and outer foot pain. B/L foot pain with significant improvement with PT, shoegear/arch support change. PT doing shock wave which felt the best. Pain has resolved to plantar and lateral feet however some discomfort remains to medial ankles after she started gardening on uneven ground after she was done with PT.     \"Present 1-2 month. Notes weight did change a bit. No difference in shoe gear however inserts help a bit (Abeo). -N/T/B. Pain is rather random, nothing really makes it better. Has fibromyalgia on medical marijuana.\" \"    PT IE 6/27:  Chronic hx of L > R foot pain; started in Feb. Did undergo PT which did help; especially with EPAT tx. Also got OTC orthotics which helped. At present notes L foot pain is > R. Pain is 1* located L > R heel (under heel) and achilles/medial side of heel/ankle. Pain is worse with standing too long, being on feet too long, and first getting up in the morning. Pain is better with supportive/solid shoes and OTC inserts. No medication at present. Still doing HEP from PT. No additional tx to date. Referred to PT for CMOs. F/u with MD in 3 months. No n/t or sensory changes.   Wears Asics Women's size 11 standard width.   Retired. Enjoys pottery and gardening.        OBJECTIVE:    Age: 69 y.o.  Weight: 194#    Foot Posture Index Score    Right Foot  Talar dome - +1  Malleolar curve - 0   Calcaneal eversion - -1  Talonavicular bulge - 0  Medial longitudinal arch - 0  Too many toes - 0  Total Score R = 0    Left Foot  Talar dome - +1  Malleolar curve - 0   Calcaneal eversion - " -1  Talonavicular bulge - 0  Medial longitudinal arch - 0  Too many toes - 0  Total Score L = 0    Reference Values  Normal =    0 to +5  Pronated =  +6 to +9 Highly Pronated =  10+  Supinated =   -1 to -4 Highly Supinated = -5 to -12      Objective      Gait Assessment:  Right Stance Phase- RF neutral at IC; slight RF varus into loading response and early stance; mild mid- late stance RF valgus and mild MF /FF pronation late stance; toe out   Left Stance Phase - RF neutral-mild Varus IC into loading response with initial WS over lateral foot; mid-late stance RF valgus and min MF/FF pronation; toe out     AROM/PROM:             MMT          AROM          PROM    Ankle         R          L          R         L          R         L   PF   WNL WNL     DF.   WFL WFL     DF bent knee         EHL         Inv.   WFL WFL     Ever.   WNL WNL     Great toe Extension    WNL WNL       Functional Ankle Dorsiflexion ROM:  NT    Palpation:  Mild ttp mid plantar surface R/L heel , mild ttp medial portion of calcaneus into medial ankle into arch R/L     Observation:  FF varus B   Increased posterior angulation of arch B   Mild L > R RF varus in static standing   Mild redness R/L lateral 5th distal digit only   No notable callusing     Neurological Testing:  Intact light touch t/o R /L foot/ankle     Joint Mobility:        Right                                       Left     Subtalar-                  Mild hypo                               Mild hypo  Midfoot-                   WFL                                   WFL     Forefoot-                 WNL                                   WNL  Talocrural-              Mild hypo                               Mild hypo  First Ray-                WNL                                       WNL    Subtalar Neutral Assessment:  Right-See scans     Left- see scans       ASSESSMENT:    Patient requires custom foot orthosis with B deepened heel cup, B medial longitudinal arch supports, B heel  pads, and potential RF lateral posting (small) to offload painful structures and  to correct altered gait mechanics contributing to pain/sx and functional limitations. Patient is not currently controlled by her motion-controlled shoe and OTC orthotics. Foot/ankle exam, gait evaluation, and Ipad scans were completed this date as noted above. Will consult with orthotist regarding patient presentation and scans. Pt will return to PT for dispensing of orthotics when they arrive. Will review orthotic fit to shoe and pt's foot, wearing schedule, gait assessment with orthotics etc at that time. Pt in agreement with plan with no questions/concerns end of session. Thank you for your referral.       Orthotic goals:  1) Patient will have custom foot orthoses fitted to her shoe.   2) Patient will be compliant with break-in period of custom foot orthoses as prescribed by PT.   3) Patient will be compliant with custom foot orthoses use as prescribed by PT.     Plan:    Planned therapy interventions: orthotic fitting/training  Duration in visits: 2

## 2024-06-27 NOTE — PROGRESS NOTES
"PT Evaluation     Today's date: 2024  Patient name: Eve Ayala  : 1955  MRN: [id number]  Referring provider: Muriel Nguyễn DPM  Dx: No diagnosis found.                          SUBJECTIVE:    Per recent MD note: \"Posterior tibialis tendinitis of both lower extremities  -     Ankle Cude ankle/Ankle Brace     Pes planus of both feet  -     Ankle Cude ankle/Ankle Brace     Fibromyalgia     Other orders  -     traZODone (DESYREL) 50 mg tablet; Take 50 mg by mouth daily at bedtime  -     Multiple Vitamin (MULTIVITAMIN ADULT PO)  -     vitamin B-12 (CYANOCOBALAMIN) 50 MCG TABS  -     Multiple Vitamins-Minerals (CENTRUM ADULTS PO)  -     Cetirizine HCl (ZYRTEC PO)              Prior Imaging   XR right and left foot WB 6v 3/26/24: No acute osseous abnormalities noted. Plantar and posterior heel spur. Mild pes planus. Left with slightly elevated 1st met.       IMPRESSION:  B/L foot pain (heel, medial ankle, lateral foot). Ddx include plantar fasciitis, Cruz's nerve entrapment, intersection syndrome of the FHL and FDL at the knot of Noel, TTS, PTTD, degenerative changes (calcaneal spurs, arthrosis of the joints of the foot), and inflammatory conditions of the ligaments and fascia of the foot and ankle.   Fibromyalgia      PLAN:  B/L foot pain with significant improvement with PT, shoegear/arch support change. Pain has resolved to plantar and lateral feet however some discomfort remains to medial ankles after she started gardening on uneven ground. I discussed posterior tendon strain and applied lace up ankle brace to reduce tension there. I discussed AFO in future if CMOs do not provide enough support and if otc brace helps more  C/w stiff bottomed sneakers/shoes (ex Asics, Vionic, New balance, Greenberg, etc) for daily use and Eloisa Mathew (recovery slides) for in home use.   Report for CMOs to be done at [facility].   PT notes reviewed today. C/w HEP. Consider returning of medial ankle pain does not " "improve  PCP note from 1/30/24 reviewed. Bariatric note from 4/3/24 reviewed    RTC 3 months      Subjective:         Subjective  Patient ID: Eve Ayala is a 69 y.o. female.     Eve presents to clinic today concerning f/u B/L heel pain and outer foot pain. B/L foot pain with significant improvement with PT, shoegear/arch support change. PT doing shock wave which felt the best. Pain has resolved to plantar and lateral feet however some discomfort remains to medial ankles after she started gardening on uneven ground after she was done with PT.     \"Present 1-2 month. Notes weight did change a bit. No difference in shoe gear however inserts help a bit (Abeo). -N/T/B. Pain is rather random, nothing really makes it better. Has fibromyalgia on medical marijuana.\" \"    PT IE 9/27:       OBJECTIVE:    Age: 69 y.o.  Weight: ***    Foot Posture Index Score    Right Foot  Talar dome - {TJS FPI Score:46769}  Malleolar curve - {TJS FPI Score:29811}   Calcaneal eversion - {TJS FPI Score:90659}  Talonavicular bulge - {TJS FPI Score:99385}  Medial longitudinal arch - {TJS FPI Score:95360}  Too many toes - {TJS FPI Score:94510}  Total Score R = ***    Left Foot  Talar dome - {TJS FPI Score:54499}  Malleolar curve - {TJS FPI Score:05113}   Calcaneal eversion - {TJS FPI Score:27916}  Talonavicular bulge - {TJS FPI Score:43569}  Medial longitudinal arch - {TJS FPI Score:33932}  Too many toes - {TJS FPI Score:28416}  Total Score L = ***    Reference Values  Normal =    0 to +5  Pronated =  +6 to +9 Highly Pronated =  10+  Supinated =   -1 to -4 Highly Supinated = -5 to -12      Objective      Gait Assessment:  Right Stance Phase- ***  Left Stance Phase - ***    AROM/PROM:             MMT          AROM          PROM    Ankle         R          L          R         L          R         L   PF         DF.         DF bent knee         EHL         Inv.         Ever.         Great toe Extension            Functional Ankle Dorsiflexion " ROM:      Palpation:  ***    Observation:  ***    Neurological Testing:  ***    Joint Mobility:        Right                         Left     Subtalar-                  ***                               ***  Midfoot-                   ***                               ***     Forefoot-                 ***                               ***  Talocrural-              ***                               ***  First Ray-                ***                               ***    Subtalar Neutral Assessment:  Right-    Left-       ASSESSMENT:    Patient requires custom foot orthosis with *** to correct altered gait mechanics contributing to pain/sx and functional limitations. Patient is not currently controlled by her motion-controlled shoe. Foot/ankle exam and Ipad scans were completed this date as noted above. Will consult with orthotist regarding patient presentation and scans. Pt will return to PT for dispensing of orthotics when they arrive. Will review orthotic fit to shoe and pt's foot, wearing schedule, gait assessment with orthotics etc at that time. Pt in agreement with plan with no questions/concerns end of session. Thank you for your referral.       Orthotic goals:  1) Patient will have custom foot orthoses fitted to her shoe.   2) Patient will be compliant with break-in period of custom foot orthoses as prescribed by PT.   3) Patient will be compliant with custom foot orthoses use as prescribed by PT.     Plan:    Planned therapy interventions: orthotic fitting/training  Duration in visits: 2

## 2024-07-10 ENCOUNTER — OFFICE VISIT (OUTPATIENT)
Dept: PHYSICAL THERAPY | Facility: CLINIC | Age: 69
End: 2024-07-10

## 2024-07-10 DIAGNOSIS — M79.672 PAIN OF BOTH HEELS: ICD-10-CM

## 2024-07-10 DIAGNOSIS — M21.41 PES PLANUS OF BOTH FEET: ICD-10-CM

## 2024-07-10 DIAGNOSIS — M76.829 PTTD (POSTERIOR TIBIAL TENDON DYSFUNCTION): ICD-10-CM

## 2024-07-10 DIAGNOSIS — M21.42 PES PLANUS OF BOTH FEET: ICD-10-CM

## 2024-07-10 DIAGNOSIS — M79.671 HEEL PAIN, BILATERAL: Primary | ICD-10-CM

## 2024-07-10 DIAGNOSIS — M79.671 PAIN OF BOTH HEELS: ICD-10-CM

## 2024-07-10 DIAGNOSIS — M79.672 HEEL PAIN, BILATERAL: Primary | ICD-10-CM

## 2024-07-10 NOTE — PROGRESS NOTES
"Daily Note     Today's date: 7/10/2024  Patient name: Eve Ayala  : 1955  MRN: [id number]  Referring provider: Muriel Nguyễn DPM  Dx:   Encounter Diagnosis     ICD-10-CM    1. Heel pain, bilateral  M79.671     M79.672       2. Pes planus of both feet  M21.41     M21.42       3. PTTD (posterior tibial tendon dysfunction)  M76.829       4. Pain of both heels  M79.671     M79.672                      Subjective: Pt notes her heel hurts more today. No new sx/complaints otherwise.       Objective: See treatment diary below      Assessment: Custom orthotics fitted to patients feet in seated and standing; accurate fit/alignment/ arch support noted B; toes to front edge of orthotics B in standing. Noted good support of feet/arches as well in standing on orthotics with no abnormal positioning or alignment observed. Inlay was removed from R/L shoe and pt was instructed how to remove inlays from other shoes/sneakers at home as well. Custom orthotics were fit to pt's shoes; PT notes length of CMOs are slightly too short for both pairs of pt's sneakers. Pt noted no unusual pain/sx in standing with orthotics in shoes initially. Pt's gait was assessed with orthotics. Improved hindfoot and midfoot support and control were noted t/o R and L stance phases without unusual pain/sx. Pt noted overall comfort in orthotics , however felt her FF \"wanted to roll outwards/to the out of foot\" also. Also felt her toes were at the edges of the orthotics. PT trialed  in lateral wedge to L FF. Pt noted the sensation of her FF wanting to roll outwards resolved and felt a lot better with the lateral FF post. Still felt her feet/toes were at edges of orthotics however, moreso in her blue sneakers than the white. PT traced inlays of pt's sneakers. Will return CMOs and tracings back to the lab to have the CMOs lengthened to inlay size. Also will have lab add lateral FF post to L orthotic as pt prefers this posting added vs without it. " Will call pt when orthotics return to the clinic. Pt in agreement with this plan with no questions/concerns end of session.      Plan: as noted above

## 2024-08-01 ENCOUNTER — OFFICE VISIT (OUTPATIENT)
Dept: PHYSICAL THERAPY | Facility: CLINIC | Age: 69
End: 2024-08-01
Payer: COMMERCIAL

## 2024-08-01 DIAGNOSIS — M76.829 PTTD (POSTERIOR TIBIAL TENDON DYSFUNCTION): ICD-10-CM

## 2024-08-01 DIAGNOSIS — M79.671 HEEL PAIN, BILATERAL: Primary | ICD-10-CM

## 2024-08-01 DIAGNOSIS — M21.42 PES PLANUS OF BOTH FEET: ICD-10-CM

## 2024-08-01 DIAGNOSIS — M79.672 HEEL PAIN, BILATERAL: Primary | ICD-10-CM

## 2024-08-01 DIAGNOSIS — M79.671 PAIN OF BOTH HEELS: ICD-10-CM

## 2024-08-01 DIAGNOSIS — M79.672 PAIN OF BOTH HEELS: ICD-10-CM

## 2024-08-01 DIAGNOSIS — M21.41 PES PLANUS OF BOTH FEET: ICD-10-CM

## 2024-08-01 PROCEDURE — L3010 FOOT LONGITUDINAL ARCH SUPPO: HCPCS | Performed by: PHYSICAL MEDICINE & REHABILITATION

## 2024-08-01 NOTE — PROGRESS NOTES
"Daily Note     Today's date: 2024  Patient name: Eve Ayala  : 1955  MRN: [id number]  Referring provider: Muriel Nguyễn DPM  Dx:   Encounter Diagnosis     ICD-10-CM    1. Heel pain, bilateral  M79.671     M79.672       2. Pes planus of both feet  M21.41     M21.42       3. PTTD (posterior tibial tendon dysfunction)  M76.829       4. Pain of both heels  M79.671     M79.672                      Subjective: Pt notes her feet have been hurting and she is really looking forward to her orthotics. Forgot to bring her other pair of shoes today.       Objective: See treatment diary below      Assessment: Custom orthotics (adjusted pair) fitted to patients feet in seated and standing; accurate fit/alignment/ arch support noted B. Noted good support of feet/arches as well in standing on orthotics with no abnormal positioning or alignment observed. Inlay was removed from R/L shoe and pt was instructed how to remove inlays from other shoes/sneakers and assess for proper fit at home as well. Custom orthotics were fit to pt's shoes and accurate fit was noted B. Pt noted no unusual pain/sx in standing with orthotics in shoes. Pt's gait was assessed with orthotics. Improved hindfoot and midfoot support and control were noted t/o R and L stance phases without unusual pain/sx. Pt noted overall good comfort in orthotics. Notes her feet felt \"really good\" and she felt she was \"walking straighter\". Reviewed and issued weaning/break in protocol with pt; instructed pt to stop wearing orthotics and contact PT if any unusual pain, increased pain/sx, redness/blisters/hot spots etc should arise; also instructed pt to call PT with any questions/concerns regarding orthotics; understanding noted/reported with no questions/concerns after session. Patient will follow up if any future problems arise.         Plan: Pt to wean into orthotics as discussed today; handout given/reviewed. Pt to contact PT if any questions, concerns, or " needs arise regarding the orthotics.
